# Patient Record
Sex: FEMALE | Race: WHITE
[De-identification: names, ages, dates, MRNs, and addresses within clinical notes are randomized per-mention and may not be internally consistent; named-entity substitution may affect disease eponyms.]

---

## 2017-06-29 ENCOUNTER — HOSPITAL ENCOUNTER (OUTPATIENT)
Dept: HOSPITAL 53 - M LAB REF | Age: 82
End: 2017-06-29
Attending: NURSE PRACTITIONER
Payer: MEDICARE

## 2017-06-29 DIAGNOSIS — R07.9: Primary | ICD-10-CM

## 2017-09-18 ENCOUNTER — HOSPITAL ENCOUNTER (OUTPATIENT)
Dept: HOSPITAL 53 - M ED | Age: 82
Setting detail: OBSERVATION
LOS: 2 days | Discharge: HOME | End: 2017-09-20
Attending: INTERNAL MEDICINE | Admitting: INTERNAL MEDICINE
Payer: MEDICARE

## 2017-09-18 VITALS — WEIGHT: 172.4 LBS | BODY MASS INDEX: 27.71 KG/M2 | HEIGHT: 66 IN
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
## 2017-09-18 DIAGNOSIS — Z87.891: ICD-10-CM
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
## 2017-09-18 DIAGNOSIS — I11.0: ICD-10-CM
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
## 2017-09-18 DIAGNOSIS — Z95.2: ICD-10-CM

## 2017-09-18 DIAGNOSIS — I48.91: ICD-10-CM

## 2017-09-18 DIAGNOSIS — Z95.0: ICD-10-CM
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
## 2017-09-18 DIAGNOSIS — Z79.899: ICD-10-CM

## 2017-09-18 DIAGNOSIS — R91.8: ICD-10-CM

## 2017-09-18 DIAGNOSIS — I50.32: ICD-10-CM

## 2017-09-18 DIAGNOSIS — N18.3: ICD-10-CM

## 2017-09-18 DIAGNOSIS — Z79.01: ICD-10-CM

## 2017-09-18 DIAGNOSIS — S27.322A: Primary | ICD-10-CM

## 2017-09-18 LAB
ANION GAP SERPL CALC-SCNC: 5 MEQ/L (ref 8–16)
BASOPHILS # BLD AUTO: 0.1 K/MM3 (ref 0–0.2)
BASOPHILS NFR BLD AUTO: 0.6 % (ref 0–1)
BUN SERPL-MCNC: 21 MG/DL (ref 7–18)
CALCIUM SERPL-MCNC: 9.6 MG/DL (ref 8.8–10.2)
CHLORIDE SERPL-SCNC: 99 MEQ/L (ref 98–107)
CO2 SERPL-SCNC: 39 MEQ/L (ref 21–32)
CREAT SERPL-MCNC: 1.03 MG/DL (ref 0.55–1.02)
EOSINOPHIL # BLD AUTO: 0.2 K/MM3 (ref 0–0.5)
EOSINOPHIL NFR BLD AUTO: 1.3 % (ref 0–3)
ERYTHROCYTE [DISTWIDTH] IN BLOOD BY AUTOMATED COUNT: 14.3 % (ref 11.5–14.5)
GFR SERPL CREATININE-BSD FRML MDRD: 54.9 ML/MIN/{1.73_M2} (ref 32–?)
GLUCOSE SERPL-MCNC: 116 MG/DL (ref 83–110)
INR PPP: 2.26
LARGE UNSTAINED CELL #: 0.2 K/MM3 (ref 0–0.4)
LARGE UNSTAINED CELL %: 2.1 % (ref 0–4)
LYMPHOCYTES # BLD AUTO: 1.1 K/MM3 (ref 1.5–4.5)
LYMPHOCYTES NFR BLD AUTO: 7.7 % (ref 24–44)
MCH RBC QN AUTO: 29.1 PG (ref 27–33)
MCHC RBC AUTO-ENTMCNC: 31.6 G/DL (ref 32–36.5)
MCV RBC AUTO: 92.1 FL (ref 80–96)
MONOCYTES # BLD AUTO: 1.1 K/MM3 (ref 0–0.8)
MONOCYTES NFR BLD AUTO: 9.3 % (ref 0–5)
NEUTROPHILS # BLD AUTO: 9.1 K/MM3 (ref 1.8–7.7)
NEUTROPHILS NFR BLD AUTO: 79 % (ref 36–66)
PLATELET # BLD AUTO: 226 K/MM3 (ref 150–450)
POTASSIUM SERPL-SCNC: 3.8 MEQ/L (ref 3.5–5.1)
SODIUM SERPL-SCNC: 143 MEQ/L (ref 136–145)
WBC # BLD AUTO: 11.5 K/MM3 (ref 4–10)

## 2017-09-18 PROCEDURE — 85025 COMPLETE CBC W/AUTO DIFF WBC: CPT

## 2017-09-18 PROCEDURE — 71101 X-RAY EXAM UNILAT RIBS/CHEST: CPT

## 2017-09-18 PROCEDURE — 99285 EMERGENCY DEPT VISIT HI MDM: CPT

## 2017-09-18 PROCEDURE — 94640 AIRWAY INHALATION TREATMENT: CPT

## 2017-09-18 PROCEDURE — 80048 BASIC METABOLIC PNL TOTAL CA: CPT

## 2017-09-18 PROCEDURE — 85610 PROTHROMBIN TIME: CPT

## 2017-09-18 PROCEDURE — 96374 THER/PROPH/DIAG INJ IV PUSH: CPT

## 2017-09-18 PROCEDURE — 71260 CT THORAX DX C+: CPT

## 2017-09-18 PROCEDURE — 36415 COLL VENOUS BLD VENIPUNCTURE: CPT

## 2017-09-18 PROCEDURE — 71010: CPT

## 2017-09-18 PROCEDURE — 94664 DEMO&/EVAL PT USE INHALER: CPT

## 2017-09-18 PROCEDURE — 70450 CT HEAD/BRAIN W/O DYE: CPT

## 2017-09-18 PROCEDURE — 93005 ELECTROCARDIOGRAM TRACING: CPT

## 2017-09-18 PROCEDURE — 83735 ASSAY OF MAGNESIUM: CPT

## 2017-09-18 PROCEDURE — 73502 X-RAY EXAM HIP UNI 2-3 VIEWS: CPT

## 2017-09-18 PROCEDURE — 97162 PT EVAL MOD COMPLEX 30 MIN: CPT

## 2017-09-18 PROCEDURE — 71020: CPT

## 2017-09-18 NOTE — REPUSA
CT of the head

Clinical history: Fall.

Protocol: Multiple axial CT images obtained with 5 mm slice thickness were obtained through the head 
without administration of contrast.

Findings: The ventricles and sulci are symmetric but prominent in size bilaterally. There are periven
tricular areas of low attenuation throughout the deep white matter. There is no evidence of acute hem
orrhage or infarct. There is no midline shift, mass effect, or extra-axial fluid collection. The osse
ous structures are unremarkable. The visualized paranasal sinuses and mastoid air cells are clear.

Impression: No acute hemorrhage or infarct. Findings are consistent with age-related atrophy and 
jin small vessel ischemic disease.

     Electronically signed by ISABELA LÓPEZ MD on 09/18/2017 08:59:45 PM ET

## 2017-09-19 VITALS — DIASTOLIC BLOOD PRESSURE: 95 MMHG | SYSTOLIC BLOOD PRESSURE: 137 MMHG

## 2017-09-19 VITALS — SYSTOLIC BLOOD PRESSURE: 178 MMHG | DIASTOLIC BLOOD PRESSURE: 75 MMHG

## 2017-09-19 VITALS — SYSTOLIC BLOOD PRESSURE: 178 MMHG | DIASTOLIC BLOOD PRESSURE: 79 MMHG

## 2017-09-19 VITALS — SYSTOLIC BLOOD PRESSURE: 151 MMHG | DIASTOLIC BLOOD PRESSURE: 69 MMHG

## 2017-09-19 VITALS — SYSTOLIC BLOOD PRESSURE: 163 MMHG | DIASTOLIC BLOOD PRESSURE: 75 MMHG

## 2017-09-19 VITALS — DIASTOLIC BLOOD PRESSURE: 72 MMHG | SYSTOLIC BLOOD PRESSURE: 140 MMHG

## 2017-09-19 RX ADMIN — POTASSIUM CHLORIDE SCH MEQ: 750 TABLET, EXTENDED RELEASE ORAL at 08:47

## 2017-09-19 RX ADMIN — Medication SCH MG: at 19:59

## 2017-09-19 RX ADMIN — Medication SCH UNITS: at 08:46

## 2017-09-19 RX ADMIN — Medication SCH MG: at 08:46

## 2017-09-19 RX ADMIN — FLUTICASONE PROPIONATE AND SALMETEROL XINAFOATE SCH PUFF: 45; 21 AEROSOL, METERED RESPIRATORY (INHALATION) at 11:00

## 2017-09-19 RX ADMIN — TORSEMIDE SCH MG: 100 TABLET ORAL at 08:46

## 2017-09-19 RX ADMIN — FLUTICASONE PROPIONATE AND SALMETEROL XINAFOATE SCH PUFF: 45; 21 AEROSOL, METERED RESPIRATORY (INHALATION) at 20:45

## 2017-09-19 RX ADMIN — Medication SCH MG: at 16:17

## 2017-09-19 RX ADMIN — TORSEMIDE SCH MG: 100 TABLET ORAL at 12:05

## 2017-09-19 NOTE — REP
Chest, single AP view, the patient supine, and expiration view:

 

Comparisons are the plain film PA and lateral chest dated 07/22/2015 and chest CT

dated 09/19/2017 and CT dated 08/20/2016.

 

Massive cardiomegaly and sternotomy wires and pacemaker are again noted,

unchanged.

 

There is chronic increased radiodensity in the right middle lobe, unchanged from

all prior studies, compatible with chronic atelectasis and scarring.

 

There is no pneumothorax or hemothorax.

 

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

 

 

Signed by

Sawyer Pantoja MD 09/19/2017 07:28 A

## 2017-09-19 NOTE — HPEPDOC
Medical History and Physical


History and Physical


Primary care provider: Dr. Telles


Date of Admission: 09/19/2017





Attending: Dr. Triny Mccarthy





CHIEF COMPLAINT: 


"I tripped and fell"





HISTORY OF PRESENT ILLNESS:


Ms. Basilio is an 82-year-old  female who is on chronic warfarin 

therapy for atrial fibrillation who tripped and fell earlier today. She denies 

any syncope, lightheadedness, or any prodromal symptoms, she simply tripped. 

She did hit her head, and does have a large contusion on the right side of her 

face. CT of the head was negative for any hemorrhage or osseous damage.  Her 

chest x-ray did show a small opacity on the right side, therefore a chest CT 

was ordered, and pulmonary contusion was confirmed.  As the patient is on 

warfarin therapy, the time during which she is at the highest risk of 

developing a bleed would be within the first 24 hours, therefore the decision 

was made to admit the patient and repeat a chest CT after at least 12 hours had 

passed.





ALLERGIES: 


Chlorpromazine (thorazine), codeine, penicillins





PAST MEDICAL HISTORY:


Chronic atrial fibrillation, on chronic Coumadin therapy


Essential hypertension


Mitral valve replacement (porcine) s/p sequelae of scarlet fever 


Chronic diastolic congestive heart failure


Stage III Chronic Kidney Disease





PAST SURGICAL HISTORY:


Mitral valve replacement





SOCIAL HISTORY:


She has been retired for over 20 years.  She used to smoke half pack a day for 

20 years but quit in 1975. She does drink alcohol occasionally. Denies 

recreational drug use.





FAMILY HISTORY:


No pertinent family history





REVIEW OF SYSTEMS:


Constitutional: Patient denies fevers, chills, night sweats, recent weight gain/

loss.


HEENT: Patient denies blurred or double vision, transient visual disturbances, 

postnasal drip, epistaxis, sore throat, difficulty chewing or swallowing food.


Cardiovascular: Patient denies chest discomfort/pain, palpitations, exertional 

dyspnea, orthopnea, edema of the extremities, claudication.


Respiratory: Patient denies dyspnea, she does state that she has coughed a few 

times since she has been in the emergency department, but it is nonproductive. 

She denies hemoptysis. She does admit to some chest wall pain, mostly on the 

right side.


Gastrointestinal: Patient denies nausea, vomiting, diarrhea, constipation, 

abdominal pain, melena, hematochezia, hematemesis, jaundice.





PHYSICAL EXAMINATION:


Vitals: Temperature 97.9, pulse 82, respirations 16, blood pressure 153/88, 

pulse oximetry 96% on room air





General: Awake, alert, oriented 3. She is in no acute distress. She denies any 

recent fevers, chills, night sweats.  


HEENT: Head normocephalic, she does have a large contusion lateral to her right 

eye.  pupils equally reactive to light and accommodation, conjunctiva are pink, 

sclera are nonicteric, buccal mucosa is pink and moist with no lesions in the 

oropharynx. Hearing is grossly intact to conversation.


Respiratory: She did have a minimal wheeze, although with a few deep breaths 

and this resolved. Otherwise she is clear to auscultation bilaterally


Cardiovascular: Irregularly irregular with variable S1 and S2. There is no 

evidence of chest wall deformity. There is no evidence of any contusion or 

ecchymoses on the chest wall.  She is diffusely (yet minimally) tender to 

palpation on the right lateral chest wall.


Abdomen: Soft, nontender, nondistended, no hepatosplenomegaly appreciated. 

Bowel sounds present.


Extremities: 2+ pulses in the radial and dorsalis pedis bilaterally. No 

evidence of clubbing or cyanosis.





IMAGING: 


In no acute traumatic injury or hemorrhages noted on the CT of the head. 

Bilateral lower lobe contusions, right worse than left and noted on CT of the 

chest.





ASSESSMENT:


1. Bilateral lower lobe lung contusions


2. Fall secondary to tripping


3. Chronic atrial fibrillation, on Coumadin


4. Essential hypertension


5. Chronic diastolic congestive heart failure


6. History of mitral valve replacement


7. Wheezing for the past 1-2 months


8. DVT prophylaxis with Coumadin





PLAN:


Will admit the patient for observation. The patient was instructed to call the 

nurse immediately if there is any change in her respiratory status, pain, if 

she developed any shortness of breath, or hemoptysis.  We will continue to 

monitor her vital signs and clinical status.  Consideration may be made to 

repeat imaging later today if this is clinically warranted.  Otherwise, we'll 

continue with her usual doses of her home regimen for her chronic medical 

issues. She was given 1 dose of PO Toradol which apparently was insufficient, 

therefore she also received 1 dose of IV morphine in the emergency department 

which she was able to tolerate without any issue in light of her codeine 

allergy.  We will attempt to control her pain with tramadol and during her stay 

here.  As the patient is in pain, she is also at risk for the development of 

pneumonia if she does not continue to take deep breaths, therefore I have also 

ordered for incentive spirometry.  She also reports that she began wheezing a 

few months ago for which she was started on an inhaler, we will continue her 

home dose, and she will likely need to continue to work this up as an 

outpatient.





My preceptor for this patient encounter was physically present in the building 

during the encounter and was fully available. As needed, all aspects of the 

patient interview, examination, medical decision making process, and medical 

care plan development were reviewed and approved by the preceptor. Preceptor is 

aware and concurs with the plan as stated in the body of this note and will 

attest to such by his/her cosignature.





Vital Signs





Vital Signs








  Date Time  Temp Pulse Resp B/P (MAP) Pulse Ox O2 Delivery O2 Flow Rate FiO2


 


9/19/17 03:15   18     


 


9/19/17 03:14    136/79 (98)    


 


9/19/17 03:08  68   93   


 


9/19/17 01:25      Room Air  


 


9/18/17 19:21 97.9       











Laboratory Data


Labs 24H


Laboratory Tests 2


9/18/17 20:09: 


White Blood Count 11.5H, Red Blood Count 4.66, Hemoglobin 13.6, Hematocrit 42.9

, Mean Corpuscular Volume 92.1, Mean Corpuscular Hemoglobin 29.1, Mean 

Corpuscular Hemoglobin Concent 31.6L, Red Cell Distribution Width 14.3, 

Platelet Count 226, Neutrophils (%) (Auto) 79.0H, Lymphocytes (%) (Auto) 7.7L, 

Monocytes (%) (Auto) 9.3H, Eosinophils (%) (Auto) 1.3, Basophils (%) (Auto) 0.6

, Neutrophils # (Auto) 9.1H, Lymphocytes # (Auto) 1.1L, Monocytes # (Auto) 1.1H

, Eosinophils # (Auto) 0.2, Basophils # (Auto) 0.1, Large Unclassified Cells % 

2.1, Large Unclassified Cells # 0.2, Prothrombin Time 25.8H, Prothromb Time 

International Ratio 2.26, Anion Gap 5L, Glomerular Filtration Rate 54.9, Blood 

Urea Nitrogen 21H, Creatinine 1.03H, Sodium Level 143, Potassium Level 3.8, 

Chloride Level 99, Carbon Dioxide Level 39H, Calcium Level 9.6


CBC/BMP


Laboratory Tests


9/18/17 20:09








Red Blood Count 4.66, Mean Corpuscular Volume 92.1, Mean Corpuscular Hemoglobin 

29.1, Mean Corpuscular Hemoglobin Concent 31.6 L, Red Cell Distribution Width 

14.3, Neutrophils (%) (Auto) 79.0 H, Lymphocytes (%) (Auto) 7.7 L, Monocytes (%

) (Auto) 9.3 H, Eosinophils (%) (Auto) 1.3, Basophils (%) (Auto) 0.6, 

Neutrophils # (Auto) 9.1 H, Lymphocytes # (Auto) 1.1 L, Monocytes # (Auto) 1.1 H

, Eosinophils # (Auto) 0.2, Basophils # (Auto) 0.1, Calcium Level 9.6





Home Medications


Scheduled


Calcium/Vitamin D (Oyster Shell Calcium + -200 mg-Unit) 1 Tab Tab, 1 TAB 

PO TID


Cholecalciferol (Vitamin D) 5,000 Unit Tab, 5,000 UNIT PO QPM


Multivitamins *[facility] STOCKED* (Thera M Plus *[facility] STOCKED*) 1 Tab Tab, 1 TAB PO 

DAILY


Potassium Chloride (K-Tab) 20 Meq Tab, 20 MEQ PO BID


Salmeterol/Fluticasone (Advair Diskus 100-50 Mcg/Dose) 28 Puff/Inhaler Aerp, 1 

PUFF INH BID


Simvastatin - High Dose (Zocor) 40 Mg Tab, 40 MG PO QHS


Torsemide (Torsemide) 100 Mg Tab, 100 MG PO DAILY


Warfarin Sod (Coumadin) 3 Mg Tab, 3 MG PO QPM





Scheduled PRN


Albuterol/Ipratropium (Ipratropium Bromide/Albut 0.5-2.5 (3) mg/3Ml) 1 Sol Sol, 

1 SOL INH QID PRN for SHORTNESS OF BREATH





Miscellaneous Medications


[Patient Comment]


   PATIENT UNSURE OF MOST OF HER MEDICATIONS AND WHEN SHE TAKES THEM. NOT A 

RELIABLE HISTORIAN. GOING TO CALL SPOUSE OR THE PHARMACY IN THE MORNING 





Allergies


Coded Allergies:  


     Chlorpromazine (Verified  Allergy, Intermediate, RASH, 4/8/13)


     Penicillins (Verified  Allergy, Intermediate, RASH, 4/8/13)


     Penicillins Cross Reactors (Verified  Allergy, Intermediate, RASH, 4/8/13)


     Codeine (Verified  Adverse Reaction, Mild, VOMITTING, 4/8/13)











CHELSEY SHEEHAN DO Sep 19, 2017 03:27

## 2017-09-19 NOTE — REP
Right hip two views:

 

Comparison is 03/17/2016.

 

There is advanced osteoarthritis, unchanged.  There is no fracture or

dislocation.  There is diffuse demineralization.

 

The bladder is opacified because of the IV contrast for the CT of the chest

earlier this same date.

 

 

Signed by

Sawyer Pantoja MD 09/19/2017 08:36 A

## 2017-09-19 NOTE — REPUSA
CLINICAL HISTORY: Trauma.

TECHNIQUE: Multiple axial CT images were obtained through chest with IV contrast material. MPR corona
l and sagittal sequences were obtained.

COMMENTS:

Pacemaker wires in good position.

Moderate cardiomegaly.

Mild central pulmonary venous congestion.

Mild bilateral basilar contusions in the lower lobes.

Subsegmental atelectatic airspace disease in the right lower lobe.

There is no evidence of pleural or parenchymal mass. There are no pleural effusions. There is no evid
ence of hilar or mediastinal lymphadenopathy. The heart and great vessels are within normal limits.

The visualized portions of the liver are of uniform attenuation without mass or defect. There is no i
ntra or extrahepatic biliary ductal dilatation. The spleen is unremarkable. The visualized pancreas i
s of normal contour and attenuation characteristics. There is no evidence of adrenal mass. The visual
ized portions of the kidneys present no abnormalities.

The bony structures are free of lytic or blastic lesions. Multilevel degenerative changes are seen in
volving the thoracic spine. Scattered calcifications are seen involving the aorta and visualized marie
r branches compatible with atherosclerosis.

No evidence for abnormal enhancement.

IMPRESSION:

Congestive heart failure.

Mild bilateral basilar pulmonary contusions in the lower lobes more prominent on the right.

No fracture.

Thank you for your kind referral of this patient.

 

     Electronically signed by ABISAI MCDANIEL MD on 09/19/2017 01:09:07 AM ET

## 2017-09-19 NOTE — REP
Right ribs and PA chest:

 

Right ribs four views:

 

There is no rib fracture or other rib abnormality.

 

There is osteoarthritis in the right chronic lobe of the humeral articulations.

 

 

 

PA chest two views add inspiration and expiration:

 

Comparison is 06/08/2017.

 

There is chronic cardiomegaly.  Sternotomy wires and pacemaker are again

identified.  There is chronic increased radiodensity inferolaterally in the right

lung compatible with chronic parenchymal scarring.

 

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

 

There is no pneumothorax, hemothorax or pulmonary contusion.

 

 

Signed by

Sawyer Pantoja MD 09/19/2017 07:24 A

## 2017-09-19 NOTE — REP
PA and lateral chest:

 

Comparisons are 09/18/2017, 06/08/2017, chest CT and 09/08/2016 and chest CT of

09/19/2017 of 36 a.m.:

 

There is chronic cardiomegaly, sternotomy wires and pacemaker, unchanged from all

prior studies.

 

There is chronic collapse and atelectasis of the right middle lobe, unchanged

from all prior studies.

 

On the most recent CT there are subsegmental infiltrates in the lower lobes

bilaterally, not present on the comparison CT, not visible on the current plain

film study.

 

There is no pneumothorax.  No definite pleural effusion.

 

The bryanna and mediastinum are unremarkable.  Bony thorax is unchanged.  There is

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

 

Impression:

 

No significant interval changes.  However, by CT there are small bilateral lower

lobe infiltrates.

 

 

Signed by

Sawyer Pantoja MD 09/19/2017 08:44 A

## 2017-09-19 NOTE — ECGEPIP
Stationary ECG Study

                           [facility] - ED

                                       

                                       Test Date:    2017

Pat Name:     GURWINDER LAGOS         Department:   

Patient ID:   [id number]                 Room:         -

Gender:       F                        Technician:   ks

:          [id number]               Requested By: NAIMA TRIPATHI 

Order Number: [id number]     Reading MD:   Madhu Kiser

                                 Measurements

Intervals                              Axis          

Rate:         72                       P:            

NC:           0                        QRS:          -67

QRSD:         129                      T:            75

QT:           413                                    

QTc:          453                                    

                           Interpretive Statements

ATRIAL FIBRILLATION WITH ABERRANT CONDUCTION OR VENTRICULAR PREMATURE

COMPLEXES

LEFT AXIS DEVIATION

ANTEROSEPTAL MYOCARDIAL INFARCTION, OF INDETERMINATE AGE

SIMILAR TO 6/24/15

 

Electronically Signed On 2017 5:52:25 EDT by Madhu Kiser

## 2017-09-19 NOTE — IPNPDOC
Subjective


Date Seen


The patient was seen on 9/19/17.





Subjective


Chief Complaint/HPI


The patient is a 82-year-old female admitted with a reason for visit of FELL.


General:  Denies: Chills, Night Sweats


Constitutional:  Denies: Chills, Fever, Malaise, Weakness, Fatigue


Eyes:  Denies: Pain, Vision change, Conjunctivae inflammation, Eyelid 

inflammation


ENT:  Denies: Head Aches, Ear Pain


Skin:  Denies: Rash


Pulmonary:  Denies: Dyspnea, Cough, Pleuritic Chest Pain, Other Symptoms


Cardiovascular:  Denies: Chest Pain, Palpitations, Orthopnea


Gastrointestinal:  Denies: Nausea, Vomiting


Genitourinary:  Denies: Dysuria


Musculoskeletal:  Reports: Other Symptoms (minimal right upper thoracic rib 

cage pain with movement, much improved from admission), 


   Denies: Neck Pain, Back Pain


Neurological:  Denies: Weakness, Numbness


Psych:  Reports: Mood Normal





Objective


Physical Examination


General Exam:  Positive: Alert, Cooperative, No Acute Distress


Eye Exam:  Positive: Conjunctiva & lids normal, EOMI, Other Eye Symptoms (

minimal bruising of right supra-orbit from fall, denies change in vision/

blurred vision or pain with eye movement), 


   Negative: Sclera icteric, Ptosis


ENT Exam:  Positive: Atraumatic, Mucous membr. moist/pink, Pharynx Normal, 

Tongue Midline, Nares Patent, 


   Negative: Pharyngeal Edema


Neck Exam:  Positive: Supple, 


   Negative: JVD, thyromegaly


Chest Exam:  Positive: Clear to auscultation, Normal air movement, Rhonchi, 


   Negative: Rales, Wheezing, Diminished


Heart Exam:  Positive: Rate Normal, Normal S1, Normal S2, 


   Negative: Murmurs, Rubs


Abdomen Exam:  Positive: Normal bowel sounds


Extremity Exam:  Positive: Normal pulses, 


   Negative: Clubbing, Cyanosis, Edema, Tenderness, Swelling


Skin Exam:  Negative: Rash, Breakdown


Neuro Exam:  Positive: Normal Speech





Assessment /Plan


Problems





(1) Pulmonary contusion


Status:  Acute


Response to Treatment:  Stable


Problem Text:  repeat CXR done today showed no interval changes but did comment 

on b/l lower lobe infiltrates. Pt did sound a bit rhonchus on lung exam but 

does not have a white count, is not complaining of cough, is afebrile, do not 

think/suspect this is pneumonia. Pt feels well and states her pain is much 

improved from admission. Will continue to monitor. Use of incentive spirometry 

was stressed to pt. Pt asked to alert nurse if she has SOB or begins to cough 

up blood. Pt verbalized understanding.





(2) Infiltrate noted on imaging study


Response to Treatment:  Stable


Problem Text:  As above, in light of no white count and afebrile with no active 

complaints of cough, will continue to monitor as do not suspect this is due to 

pneumonia. Incentive spirometry use was encouraged to pt.





(3) Diastolic heart failure


Status:  Chronic


Response to Treatment:  Stable


Problem Text:  pt seems euvolemic


will continue home torsemide





(4) Hypertension


Status:  Chronic


Response to Treatment:  Stable


Problem Text:  178/75 pt receiving IV torsemide


will continue to monitor, if it continues to increase may consider IV 

hydralizine if SBP >180





(5) H/O mitral valve replacement


Status:  Chronic


Response to Treatment:  Stable


Problem Text:  stable, porcine valve, would continue to monitor





(6) Atrial fibrillation


Status:  Chronic


Response to Treatment:  Stable


Problem Text:  continue coumadin, INR is therapeutic





(7) DVT prophylaxis


Status:  Acute


Response to Treatment:  Stable


Problem Text:  coumidin therapy








Plan/VTE


VTE Prophylaxis Ordered?:  Yes





VS, I&O, 24H, Fishbone


Vital Signs/I&O





Vital Signs








  Date Time  Temp Pulse Resp B/P (MAP) Pulse Ox O2 Delivery O2 Flow Rate FiO2


 


9/19/17 12:09      Room Air  


 


9/19/17 12:00 97.9 69 20 178/75 (109) 94   














I&O- Last 24 Hours up to 6 AM 


 


 9/19/17





 06:00


 


Intake Total 30 ml


 


Balance 30 ml











Laboratory Data


24H LABS


Laboratory Tests 2


9/18/17 20:09: 


White Blood Count 11.5H, Red Blood Count 4.66, Hemoglobin 13.6, Hematocrit 42.9

, Mean Corpuscular Volume 92.1, Mean Corpuscular Hemoglobin 29.1, Mean 

Corpuscular Hemoglobin Concent 31.6L, Red Cell Distribution Width 14.3, 

Platelet Count 226, Neutrophils (%) (Auto) 79.0H, Lymphocytes (%) (Auto) 7.7L, 

Monocytes (%) (Auto) 9.3H, Eosinophils (%) (Auto) 1.3, Basophils (%) (Auto) 0.6

, Neutrophils # (Auto) 9.1H, Lymphocytes # (Auto) 1.1L, Monocytes # (Auto) 1.1H

, Eosinophils # (Auto) 0.2, Basophils # (Auto) 0.1, Large Unclassified Cells % 

2.1, Large Unclassified Cells # 0.2, Prothrombin Time 25.8H, Prothromb Time 

International Ratio 2.26, Anion Gap 5L, Glomerular Filtration Rate 54.9, Blood 

Urea Nitrogen 21H, Creatinine 1.03H, Sodium Level 143, Potassium Level 3.8, 

Chloride Level 99, Carbon Dioxide Level 39H, Calcium Level 9.6


CBC/BMP


Laboratory Tests


9/18/17 20:09








Red Blood Count 4.66, Mean Corpuscular Volume 92.1, Mean Corpuscular Hemoglobin 

29.1, Mean Corpuscular Hemoglobin Concent 31.6 L, Red Cell Distribution Width 

14.3, Neutrophils (%) (Auto) 79.0 H, Lymphocytes (%) (Auto) 7.7 L, Monocytes (%

) (Auto) 9.3 H, Eosinophils (%) (Auto) 1.3, Basophils (%) (Auto) 0.6, 

Neutrophils # (Auto) 9.1 H, Lymphocytes # (Auto) 1.1 L, Monocytes # (Auto) 1.1 H

, Eosinophils # (Auto) 0.2, Basophils # (Auto) 0.1, Calcium Level 9.6





GME ATTESTATION


GME ATTESTATION


My preceptor for this patient encounter was physically present in the building 

during the encounter and was fully available. As needed, all aspects of the 

patient interview, examination, medical decision making process, and medical 

care plan development were reviewed and approved by the preceptor. Preceptor is 

aware and concurs with the plan as stated in the body of this note and will 

attest to such by his/her cosignature.





ATTENDING NOTE


I have seen and examined the above patient and agree with the progress note as 

documented above.











LINDA REDMAN DO Sep 19, 2017 12:45


PARMINDER NEVES Sep 30, 2017 18:11

## 2017-09-20 VITALS — SYSTOLIC BLOOD PRESSURE: 169 MMHG | DIASTOLIC BLOOD PRESSURE: 76 MMHG

## 2017-09-20 VITALS — DIASTOLIC BLOOD PRESSURE: 71 MMHG | SYSTOLIC BLOOD PRESSURE: 155 MMHG

## 2017-09-20 VITALS — DIASTOLIC BLOOD PRESSURE: 77 MMHG | SYSTOLIC BLOOD PRESSURE: 167 MMHG

## 2017-09-20 LAB
ANION GAP SERPL CALC-SCNC: 5 MEQ/L (ref 8–16)
BASOPHILS # BLD AUTO: 0.1 K/MM3 (ref 0–0.2)
BASOPHILS NFR BLD AUTO: 0.5 % (ref 0–1)
BUN SERPL-MCNC: 20 MG/DL (ref 7–18)
CALCIUM SERPL-MCNC: 9.5 MG/DL (ref 8.8–10.2)
CHLORIDE SERPL-SCNC: 97 MEQ/L (ref 98–107)
CO2 SERPL-SCNC: 35 MEQ/L (ref 21–32)
CREAT SERPL-MCNC: 0.78 MG/DL (ref 0.55–1.02)
EOSINOPHIL # BLD AUTO: 0.1 K/MM3 (ref 0–0.5)
EOSINOPHIL NFR BLD AUTO: 1 % (ref 0–3)
ERYTHROCYTE [DISTWIDTH] IN BLOOD BY AUTOMATED COUNT: 14.2 % (ref 11.5–14.5)
GFR SERPL CREATININE-BSD FRML MDRD: > 60 ML/MIN/{1.73_M2} (ref 32–?)
GLUCOSE SERPL-MCNC: 110 MG/DL (ref 83–110)
INR PPP: 2.35
LARGE UNSTAINED CELL #: 0.3 K/MM3 (ref 0–0.4)
LARGE UNSTAINED CELL %: 2.6 % (ref 0–4)
LYMPHOCYTES # BLD AUTO: 1 K/MM3 (ref 1.5–4.5)
LYMPHOCYTES NFR BLD AUTO: 8.2 % (ref 24–44)
MAGNESIUM SERPL-MCNC: 2.3 MG/DL (ref 1.8–2.4)
MCH RBC QN AUTO: 30.3 PG (ref 27–33)
MCHC RBC AUTO-ENTMCNC: 33.6 G/DL (ref 32–36.5)
MCV RBC AUTO: 90.4 FL (ref 80–96)
MONOCYTES # BLD AUTO: 1 K/MM3 (ref 0–0.8)
MONOCYTES NFR BLD AUTO: 8.2 % (ref 0–5)
NEUTROPHILS # BLD AUTO: 9.6 K/MM3 (ref 1.8–7.7)
NEUTROPHILS NFR BLD AUTO: 79.4 % (ref 36–66)
PLATELET # BLD AUTO: 211 K/MM3 (ref 150–450)
POTASSIUM SERPL-SCNC: 3.4 MEQ/L (ref 3.5–5.1)
SODIUM SERPL-SCNC: 137 MEQ/L (ref 136–145)
WBC # BLD AUTO: 12.1 K/MM3 (ref 4–10)

## 2017-09-20 RX ADMIN — POTASSIUM CHLORIDE SCH MEQ: 750 TABLET, EXTENDED RELEASE ORAL at 08:47

## 2017-09-20 RX ADMIN — TORSEMIDE SCH MG: 100 TABLET ORAL at 08:47

## 2017-09-20 RX ADMIN — Medication SCH MG: at 08:47

## 2017-09-20 RX ADMIN — Medication SCH UNITS: at 08:47

## 2017-09-20 NOTE — DS.PDOC
Discharge Summary


General


Date of Admission


Sep 18, 2017 at 19:09


Date of Discharge


9-20-17


Primary Care Physician:  Jr Telles Collins





Discharge Summary


PROCEDURES PERFORMED DURING STAY: None





ADMITTING DIAGNOSES: 


1. B/l lower lobe contusions


2. Fall secondary to tripping


3. Chronic A. fib on coumidin


4. Essential HTN


5. Chronic diastolic CHF


6. Hx of mitral valve replacement


7. wheezing for past 2 months


8. DVT prohphylaxis on coumadin





DISCHARGE DIAGNOSES:


1. Lung contusion


2.  Infiltrates on CT


3. Diastolic HF


4. HTN


5. history of MV replacement


6. DVT prophylaxis








COMPLICATIONS/CHIEF COMPLAINT: Altered Mental Status/Pulmonary Contusion.





HISTORY OF PRESENT ILLNESS: Pt is a 83 y/o female who presened to ED on 9-19- 2017 with complaint of having tripped on her rug that day and falling on her 

side and hitting her head. 





HOSPITAL COURSE:  During the stay the pt did have some mild right upper 

thoracic rib cage pain with movement, especially with sitting upright in bed, 

this was controlled with tramadol medication. The pt did not complain of SOB or 

coughing up blood during the course of her stay. She did also suffer a right 

sided supra orbital contusion but did not suffer from LOC from her fall, during 

her stay she did not complain of any change in vision, blurred vision or pain 

with eye movement. On the day of d/c the pt was in some discomfort before her 

tramadol medication but stated it felt much better when her medication had 

taken effect, she denied being SOB or having any episodes of dizzyness or chest 

pain nor racing heart. 





DISCHARGE MEDICATIONS: Please see below.


 


ALLERGIES: Please see below.





PHYSICAL EXAMINATION ON DISCHARGE:


VITAL SIGNS: Please see below.


GENERAL: AAOx3, conversant and plesant, NAD


HEENT:  nares patent b/l, EOMI, clear conjunctiva, no pain with eye movements. 

NCAT.


NECK: supple


CARDIOVASCULAR EXAMINATION: normal s1 and s2, no murmurs, rubs or gallops 

appreciated


RESPIRATORY EXAMINATION: cta b/l, no wheezing, rhonchi or rales appreciated


ABDOMINAL EXAMINATION: soft, non-distended, nabsx4, no rebound ridgity or 

guarding appreciated


EXTREMITIES: no rashes, erythema or clubbing noted


SKIN: intact


NEUROLOGICAL EXAMINATION: no focal deficits appreciated


PSYCHIATRIC EXAMINATION: normal affect





LABORATORY DATA: Please see below.





IMAGING: 





CT  head 9-18-17 


Impression: No acute hemorrhage or infarct. Findings are consistent with age-

related atrophy and chronic small vessel ischemic disease.





Rib x-ray 9-18-17


There is chronic cardiomegaly.  Sternotomy wires and pacemaker are again


identified.  There is chronic increased radiodensity inferolaterally in the 

right


lung compatible with chronic parenchymal scarring.


 


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


 


There is no pneumothorax, hemothorax or pulmonary contusion.





CXR 9-18-17


 


Massive cardiomegaly and sternotomy wires and pacemaker are again noted,


unchanged.


 


There is chronic increased radiodensity in the right middle lobe, unchanged from


all prior studies, compatible with chronic atelectasis and scarring.


 


There is no pneumothorax or hemothorax.


 


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


 


Chest CT 9-18-17


IMPRESSION:


Congestive heart failure.


Mild bilateral basilar pulmonary contusions in the lower lobes more prominent 

on the right.


No fracture.





Hip x-ray 9-19-17


There is advanced osteoarthritis, unchanged.  There is no fracture or


dislocation.  There is diffuse demineralization.


 


The bladder is opacified because of the IV contrast for the CT of the chest


earlier this same date


CXR 9-19-17





Impression:


 


No significant interval changes.  However, by CT there are small bilateral lower


lobe infiltrates.








PROGNOSIS: favorable





ACTIVITY: As tolerated





DIET: as tolerated





DISCHARGE PLAN: follow with PCP within one week of d/c





DISPOSITION: stable





DISCHARGE INSTRUCTIONS:


1. Follow with PCP within one week of d/c


2. Should you experience coughing with blood, fever, increased chest pain, 

shortness of breath return to ED ASAP








ITEMS TO FOLLOWUP ON ON OUTPATIENT:


1. Follow with PCP within one week of d/c








DISCHARGE CONDITION: Stable





TIME SPENT ON DISCHARGE: Greater than 35 minutes.





Vital Signs/I&Os





Vital Signs








  Date Time  Temp Pulse Resp B/P (MAP) Pulse Ox O2 Delivery O2 Flow Rate FiO2


 


9/20/17 08:07      Room Air  


 


9/20/17 08:00 98.2 64 20 167/77 (107) 93   














I&O- Last 24 Hours up to 6 AM 


 


 9/21/17





 06:00


 


Intake Total 720 ml


 


Output Total 100 ml


 


Balance 620 ml











Laboratory Data


Labs 24H


Laboratory Tests 2


9/20/17 05:40: 


White Blood Count 12.1H, Red Blood Count 4.87, Hemoglobin 14.8, Hematocrit 44.0

, Mean Corpuscular Volume 90.4, Mean Corpuscular Hemoglobin 30.3, Mean 

Corpuscular Hemoglobin Concent 33.6, Red Cell Distribution Width 14.2, Platelet 

Count 211, Neutrophils (%) (Auto) 79.4H, Lymphocytes (%) (Auto) 8.2L, Monocytes 

(%) (Auto) 8.2H, Eosinophils (%) (Auto) 1.0, Basophils (%) (Auto) 0.5, 

Neutrophils # (Auto) 9.6H, Lymphocytes # (Auto) 1.0L, Monocytes # (Auto) 1.0H, 

Eosinophils # (Auto) 0.1, Basophils # (Auto) 0.1, Large Unclassified Cells % 2.6

, Large Unclassified Cells # 0.3, Prothrombin Time 26.6H, Prothromb Time 

International Ratio 2.35, Anion Gap 5L, Glomerular Filtration Rate > 60.0, 

Blood Urea Nitrogen 20H, Creatinine 0.78, Sodium Level 137, Potassium Level 3.4L

, Chloride Level 97L, Carbon Dioxide Level 35H, Calcium Level 9.5, Magnesium 

Level 2.3


CBC/BMP


Laboratory Tests


9/20/17 05:40








Red Blood Count 4.87, Mean Corpuscular Volume 90.4, Mean Corpuscular Hemoglobin 

30.3, Mean Corpuscular Hemoglobin Concent 33.6, Red Cell Distribution Width 14.2

, Neutrophils (%) (Auto) 79.4 H, Lymphocytes (%) (Auto) 8.2 L, Monocytes (%) (

Auto) 8.2 H, Eosinophils (%) (Auto) 1.0, Basophils (%) (Auto) 0.5, Neutrophils 

# (Auto) 9.6 H, Lymphocytes # (Auto) 1.0 L, Monocytes # (Auto) 1.0 H, 

Eosinophils # (Auto) 0.1, Basophils # (Auto) 0.1, Calcium Level 9.5





Discharge Medications


Scheduled


Calcium/Vitamin D (Oyster Shell Calcium + -200 mg-Unit) 1 Tab Tab, 1 TAB 

PO TID, (Reported)


Cholecalciferol (Vitamin D) 5,000 Unit Tab, 5,000 UNIT PO DAILY, (Reported)


   TAKES AT NOON 


Docusate Sodium (Docusate Sodium) 100 Mg Cap, 100 MG PO QHS, (Reported)


Multivitamins *[facility] STOCKED* (Thera M Plus *[facility] STOCKED*) 1 Tab Tab, 1 TAB PO QHS

, (Reported)


Potassium Chloride (K-Tab) 20 Meq Tab, 20 MEQ PO DAILY, (Reported)


Salmeterol/Fluticasone (Advair Diskus 100-50 Mcg/Dose) 28 Puff/Inhaler Aerp, 1 

PUFF INH BID, (Reported)


Simvastatin - High Dose (Zocor) 40 Mg Tab, 40 MG PO QHS, (Reported)


Torsemide (Torsemide) 100 Mg Tab, 100 MG PO ASDIRECTED, (Reported)


   NORMALLY, TAKES ONCE A DAY; BEEN TAKING IT BID THIS WEEK PER DOCTOR. TAKES 

IN MORNING AND NOON. 


Warfarin Sod (Coumadin) 3 Mg Tab, 3 MG PO QPM, (Reported)


   TAKES AT DINNERTIME 





Scheduled PRN


Albuterol/Ipratropium (Ipratropium Bromide/Albut 0.5-2.5 (3) mg/3Ml) 1 Sol Sol, 

1 SOL INH QID PRN for SHORTNESS OF BREATH, (Reported)


Tramadol HCl (Tramadol HCl) 50 Mg Tab, 50 MG PO Q6HP PRN for MODERATE PAIN (PS 5

-7)





Allergies


Coded Allergies:  


     Chlorpromazine (Verified  Allergy, Intermediate, RASH, 4/8/13)


     Penicillins (Verified  Allergy, Intermediate, RASH, 4/8/13)


     Penicillins Cross Reactors (Verified  Allergy, Intermediate, RASH, 4/8/13)


     Codeine (Verified  Adverse Reaction, Mild, VOMITTING, 4/8/13)





GME ATTESTATION


GME ATTESTATION


My preceptor for this patient encounter was physically present in the building 

during the encounter and was fully available. As needed, all aspects of the 

patient interview, examination, medical decision making process, and medical 

care plan development were reviewed and approved by the preceptor. Preceptor is 

aware and concurs with the plan as stated in the body of this note and will 

attest to such by his/her cosignature.











LINDA REDMAN DO Sep 20, 2017 11:21

## 2017-10-03 ENCOUNTER — HOSPITAL ENCOUNTER (OUTPATIENT)
Dept: HOSPITAL 53 - M LAB REF | Age: 82
End: 2017-10-03
Attending: NURSE PRACTITIONER
Payer: MEDICARE

## 2017-10-03 DIAGNOSIS — G30.1: Primary | ICD-10-CM

## 2017-10-03 LAB
FOLATE SERPL-MCNC: 18 NG/ML
VIT B12 SERPL-MCNC: 605 PG/ML

## 2019-09-07 NOTE — HPEPDOC
[facility] Medical History & Physical


Date of Admission


Sep 7, 2019


Date of Service:  Sep 7, 2019


Primary Care Physician:  Jr Telles Collins


Attending Physician:  KAYLIE DELAROSA MD





History and Physical


TIME OF SERVICE: 950pm





The patient is a poor historian. The HPI was obtained from the patient's 

daughter and ED attending





CHIEF COMPLAINT: Bloody urine





HISTORY OF PRESENT ILLNESS: 


This is an 84-year-old female who was brought to the ED today for evaluation of 

hematuria. According to the patient's daughter earlier during the day the 

patient's  noticed that she kept on going to the bathroom. She complained

of pain when she urinated one time and thereafter developed cady blood in the 

urine. Currently the patient continues to have polyuria but the dysuria has 

resolved. She denies having abdominal pain, denies having back pain, denies 

having fevers, and denies having chills. According to the patient's daughter who

corroborated the review of systems, in the ED attending the patient had right-

sided CVA tenderness.





REVIEW OF SYSTEMS: 12 point review of systems negative except as listed in HPI





PAST MEDICAL/ SURGICAL HISTORY:


Afib w Mitral Valve Prosthesis (Coumadin)


Alzheimer's Dementia 


Chronic Diastolic CHF / Chronic HTN


Hypothyroidism


COPD


CKD 3 


Dyslipidemia


Unsteady gait/uses a walker to ambulate.


Status post pacemaker placement





SOCIAL HISTORY:


Quit smoking, smoked half a pack for 20 years.


His .


The patient is dependent on her  and her daughter for assistance with 

IADLs





FAMILY HISTORY:


Patient's daughter denies family history of cancer.





ALLERGIES: Please see below.





HOME MEDICATIONS: Please see below. 





PHYSICAL EXAMINATION:


VITAL SIGNS:  Please see below.


GENERAL APPEARANCE: Well-nourished, well-developed, not in apparent distress, 

does not appear toxic


HEENT: Normocephalic, atraumatic, mucous members moist and pink


CARDIOVASCULAR: Regular rate and rhythm. No murmurs, rubs or gallops


LUNGS: Clear to auscultation bilaterally on room air


ABDOMEN: The abdomen is soft and nontender on palpation. Bowel sounds are 

hypoactive


MUSCULOSKELETAL: Age of motion is intact in all 4 extremities


INTEGUMENT: Skin does not appear flushed and diaphoretic


NEUROLOGICAL: Cranial nerves II-12 grossly intact. Speech is not dysarthric


PSYCHIATRIC: Alert and oriented to person, place and time, able to understand 

and follow commands





LABORATORY DATA: See below.





IMAGING: 


CT of the abdomen showed an enlarged right atrium, pacemaker, cholelithiasis, 

possible antral gastritis, moderate left renal atrophy with parenchymal 

thickening, chronic diverticulosis, and bladder wall thickening with 

perivascular induration 





MICROBIOLOGY: Please see below. 





ASSESSMENT: 


Ms. Basilio is an 10-year-old female with a past medical history of atrial 

fibrillation, prosthetic mitral valve, chronic diastolic congestive heart 

failure, chronic hypertension, CKD 3, COPD, hypothyroidism and Alzheimer's 

dementia who will be admitted for management of pyelonephritis.





PLAN:


1.Pyelonephritis


-symptoms include CVA tenderness and dysuria


-WBC # elevated


-UA showed 3+ blood, leukocyte esterase and WBCs and bacteria


- Cr & BUN slightly elevated 


Plan: admit to [facility] / f/u Ucx, blood Cx and renal function / switch to PO 

Ciprofloxacin tomorrow





2. Hematuria


CT showed bladder wall thicknening that could be due to UTI or malignancy


I discussed this finding with the patient's daughter will talk to the the 

patient's father about whether they want urology eval 


Plan: day time team can f/u w patients  to determine if they want Uro 

eval / cystocscopy to r/o cancer





3. Afib w Mitral Valve Prosthesis (Coumadin)


INR 2.3 


Plan: c/w home meds





4.Chronic Diastolic CHF /Chronic  HTN


Clinically compensated.


Plan: Control blood pressure /Continue home meds





5.COPD


Stable


Plan: Continue home meds





6. CKD 3 


Plan: f/u BMP avoid NSAIDs





7. Hypothyroidism


Plan: Continue home meds





8. Alzheimer's Dementia.


Plan: Patient's family will spend the night and try to be present during the 

hospitalization /continue home meds





DVT px w SCDs bc of hematuria





Dispo pending clinical course





Vital Signs





Vital Signs








  Date Time  Temp Pulse Resp B/P (MAP) Pulse Ox O2 Delivery O2 Flow Rate FiO2


 


9/7/19 19:54 97.8 63 16 149/67 (94) 96 Room Air  











Laboratory Data


Labs 24H


Laboratory Tests 2


9/7/19 16:13: 


Urine Color REDH, Urine Appearance CLOUDYH, Urine pH 8.0, Urine Specific Gravity

1.008, Urine Protein 2+H, Urine Glucose (UA) NEGATIVE, Urine Ketones NEGATIVE, 

Urine Blood 3+H, Urine Nitrite NEGATIVE, Urine Bilirubin NEGATIVE, Urine 

Urobilinogen 0.2, Urine Leukocyte Esterase 1+H, Urine WBC (Auto) 68H, Urine RBC 

(Auto) TNTCH, Urine Hyaline Casts (Auto) 0, Urine Bacteria (Auto) 1+H, Urine 

Squamous Epithelial Cells 0, Urine Sperm (Auto) 


9/7/19 17:41: 


Immature Granulocyte % (Auto) 0.5, White Blood Count 16.0H, Red Blood Count 

4.54, Hemoglobin 13.6, Hematocrit 41.4, Mean Corpuscular Volume 91.2, Mean 

Corpuscular Hemoglobin 30.0, Mean Corpuscular Hemoglobin Concent 32.9, Red Cell 

Distribution Width 15.2H, Platelet Count 211, Neutrophils (%) (Auto) 77.1H, 

Lymphocytes (%) (Auto) 6.1L, Monocytes (%) (Auto) 13.5H, Eosinophils (%) (Auto) 

2.1, Basophils (%) (Auto) 0.7, Neutrophils # (Auto) 12.3H, Lymphocytes # (Auto) 

1.0L, Monocytes # (Auto) 2.2H, Eosinophils # (Auto) 0.3, Basophils # (Auto) 0.1,

Nucleated Red Blood Cells % (auto) 0.0, Prothrombin Time 23.6H, Prothromb Time 

International Ratio 2.13, Activated Partial Thromboplast Time 34.8


9/7/19 17:53: 


POC Glucose (Misc Panel) 106H, POC Sodium (Misc Panel) 135L, POC Potassium (Misc

Panel) 4.1, POC Chloride (Misc Panel) 95L, POC Total CO2 (Misc Panel) 32.0H, POC

Blood Urea Nitrogen (Misc Panel 27H, POC Ionized Calcium (Misc Panel) 4.8, POC 

Creatinine (Misc Panel) 1.4H, POC Hematocrit (Misc Panel) 42.0


CBC/BMP


Laboratory Tests


9/7/19 17:41








Red Blood Count 4.54, Mean Corpuscular Volume 91.2, Mean Corpuscular Hemoglobin 

30.0, Mean Corpuscular Hemoglobin Concent 32.9, Red Cell Distribution Width 15.2

H, Neutrophils (%) (Auto) 77.1 H, Lymphocytes (%) (Auto) 6.1 L, Monocytes (%) 

(Auto) 13.5 H, Eosinophils (%) (Auto) 2.1, Basophils (%) (Auto) 0.7, Neutrophils

# (Auto) 12.3 H, Lymphocytes # (Auto) 1.0 L, Monocytes # (Auto) 2.2 H, 

Eosinophils # (Auto) 0.3, Basophils # (Auto) 0.1


Microbiology





Microbiology


9/7/19 Urine Culture, Received


         Pending





Home Medications


Scheduled


Calcium Carbonate/Vitamin D3 (Calcium 500-Vit D3 200 Caplet) 1 Each Tablet, 1 

TAB PO TID


Cholecalciferol (Vitamin D3) (Vitamin D3) 5,000 Unit Capsule, 5,000 UNIT PO 

DAILY


   TAKES AT NOON 


Docusate Sodium (Colace) 100 Mg Capsule, 100 MG PO BID


Donepezil HCl (Aricept) 5 Mg Tab, 5 MG PO QHS


Levothyroxine Sodium (Synthroid) 25 Mcg Tab, 25 MCG PO DAILY


   TAKES AT 0700 


Multivitamin (Multivitamins) 1 Each Capsule, 1 CAP PO QHS


Potassium Chloride (Potassium Chloride) 10 Meq Tab.er.prt, 10 MEQ PO BID


   TAKES 0800 AND 1800 


Salmeterol/Fluticasone (Advair 100-50 Diskus) 28 Puff/Inhaler Aerp, 1 PUFF INH 

BID


Simvastatin (Simvastatin) 40 Mg Tab, 40 MG PO QHS


Spironolactone (Spironolactone) 25 Mg Tab, 25 MG PO DAILY


Torsemide (Torsemide) 100 Mg Tab, 50 MG PO BID


   TAKES 0800 AND 1200 


Umeclidinium Bromide (Incruse Ellipta) 62.5 Mcg Blst.w.dev, 1 PUFF PO QPM


Warfarin Sodium (Warfarin Sodium) 3 Mg Tab, 3 MG PO QPM


   TAKES AT 1800 





Allergies


Coded Allergies:  


     Penicillins (Verified  Allergy, Intermediate, rash, 9/7/19)


     chlorpromazine (Verified  Allergy, Unknown, 9/7/19)


     codeine (Verified  Adverse Reaction, Unknown, vomiting, 9/7/19)





A-FIB/CHADSVASC


A-FIB History


Current/History of A-Fib/PAF?:  Yes


Current PO Anticoag Therapy:  Yes





Age/Risk Factor Scoring


CHADSVASC:  








CHADSVASC Response (Comments) Value


 


Age Risk Factor Age >/= 75 years old 2


 


Gender Risk Factor Female 1


 


Hx of CHF Yes 1


 


Hx of HTN Yes 1


 


Hx of Stroke/TIA/or VTE No 0


 


Hx of Diabetes No 0


 


Hx of Vascular Disease No 0


 


Total  5











Treatment


Treatment ordered:  KAYLIE Kohli MD                 Sep 7, 2019 21:31

## 2019-09-07 NOTE — REPVR
EXAM: 

CT Abdomen and Pelvis With Contrast 



EXAM DATE/TIME: 

9/7/2019 7:38 PM 



CLINICAL HISTORY: 

84 years old, female; Abdominal pain; Tenderness; Other: CVA; Additional info: 

UTI, elev. Wbc, CVA tender 



TECHNIQUE: 

Imaging protocol: Computed tomography of the abdomen and pelvis with 

intravenous contrast. 

Radiation optimization: All CT scans at this facility use at least one of these 

dose optimization techniques: automated exposure control; mA and/or kV 

adjustment per patient size (includes targeted exams where dose is matched to 

clinical indication); or iterative reconstruction. 

Contrast material: ISOVUE 370; Contrast volume: 100 ml; Contrast route: IV;  



COMPARISON: 

CT ABD PELVIS W/O CONTRAST 7/26/2018 5:56 AM 



FINDINGS: 

Tubes, catheters and devices: Pacemaker in position. 

Lungs: Minimal bibasilar interstitial prominence. 

Heart: Prominent enlargement of the right atrium. 



Liver: The liver attenuation is 81 Hounsfield units and the spleen is 106 

Hounsfield units. 

Gallbladder and bile ducts: There is a gallstone in the gallbladder measuring 

17 mm. 

Pancreas: Normal. No ductal dilation. 

Spleen: Splenic calcifications. 

Adrenals: Normal. No mass. 

Kidneys and ureters: Moderate left renal atrophy with parenchymal thinning, 

particularly the upper pole. There is a left renal cyst measuring up to 20 mm. 

Stomach and bowel: Slight wall thickening of the gastric antrum and pylorus. 

Colonic diverticulosis without diverticulitis. 

Appendix: A normal retrocecal appendix is seen. 

Intraperitoneal space: Unremarkable. No free air. No significant fluid 

collection. 

Vasculature: There is moderate atherosclerotic calcification of the abdominal 

aorta with extension into the iliac arteries. 

Lymph nodes: Unremarkable. No enlarged lymph nodes. 



Bladder: There is bladder wall thickening, however, the bladder is nondistended 

and is nonspecific. There is some perivesicular induration, however. Facet 

arthropathy of lower lumbar spine. 

Reproductive: Unremarkable as visualized. 

Bones/joints: Degenerative changes of the hips, right greater than left. 

Soft tissues: Unremarkable. 



IMPRESSION: 

1. Enlarged right atrium and pacemaker in position which is unchanged from 

7/26/2018. 

2. Cholelithiasis 

3. Question of antral gastritis which is similar. 

4. Moderate left renal atrophy with parenchymal thinning. 

5. Colonic diverticulosis without diverticulitis. 

6. There is bladder wall thickening, however, the bladder is nondistended and 

is nonspecific. There is perivesicular induration and cystitis is not excluded. 



Electronically signed by: Thomas Arevalo On 09/07/2019  20:28:11 PM

## 2019-09-08 NOTE — IPNPDOC
Date Seen


The patient was seen on 9/8/19.





Progress Note


SUBJECTIVE: Patient denies any complaints at this time, she is feeling good 

otherwise patient denies chest pain, shortness breath, nausea, vomiting, fevers,

chills. Patient is oriented to person but not place time or situation which is 

her baseline and as such much of the history is obtained from the patient's 

daughter who lives with her at his bedside. Patient has had hematuria for one 

day as well as frequent urination and some dysuria with being unable to void at 

times yesterday prompting her to present to the emergency room yesterday.





OBJECTIVE


PHYSICAL EXAMINATION:


VITAL SIGNS: Please see below. 


GENERAL: Pleasant elderly  female sitting up in bed  no acute distress 

feeding herself breakfast


HEENT: Moist mucous membranes no elevation in CVP


CARDIOVASCULAR: S1 S2 regular no additional heart sounds appreciated.


RESPIRATORY: Clear to auscultation bilaterally.


ABDOMINAL: Bowel sounds present abdomen soft and nontender CVA tenderness 

positive more so on the left than the right


EXTREMITIES: No clubbing cyanosis or edema


NEUROLOGICAL: Spontaneously moves all 4 extremities cranial 2 through 12 grossly

intact no gross focal deficits appreciated


PSYCHOLOGICAL: Appropriate but disoriented





LABORATORY DATA, MICROBIOLOGY: Please see below.





IMAGING STUDIES:


CT abdomen and pelvis:1. Enlarged right atrium and pacemaker in position which 

is unchanged from 


7/26/2018. 


2. Cholelithiasis 


3. Question of antral gastritis which is similar. 


4. Moderate left renal atrophy with parenchymal thinning. 


5. Colonic diverticulosis without diverticulitis. 


6. There is bladder wall thickening, however, the bladder is nondistended and 


is nonspecific. There is perivesicular induration and cystitis is not excluded. 





ASSESSMENT AND PLAN: This is a 84-year-old female with pyelonephritis





PROBLEMS:


1. Pyelonephritis: History is difficult given the patient's presentation however

her family is quite reliable she's had increased urinations as well as 

complained of some intermittent difficulty of urination and hematuria within the

last 24 hours. UA is abnormal is mostly blood but certainly could be taken to be

suggestive of a urinary tract infection in the setting. As such he did receive 1

dose of ceftriaxone is currently on ciprofloxacin which we will continue she 

appears to be tolerating it quite well. We'll monitor her urine for improvement 

of ulcerative hemoglobin as she did reportedly have quite significant bleeding. 

My suspicion for any anemia is quite low though. It is possible that her 

bleeding secondary to anticoagulation and underlying bladder tumor she did have 

some bladder wall thickness however I would treat her for urinary tract 

infection and see if her symptoms resolved without any recurrence prior to 

further investigations or cystoscopy and I did discuss at length with the family

bedside. Follow culture data should likely complete 2 weeks of antibiotics





2. Atrial fibrillation: She is rate controlled she is anticoagulated. She is 

status post pacer





3. Alzheimer's type dementia: She is at her baseline cognitive status. Continue 

with Aricept





4. Hypothyroidism: Continue with Synthroid





5. COPD: Patient is at her baseline respiratory status, continue with Advair and

umeclidinium 





6. Chronic kidney disease: Stable





7. Dyslipidemia: Resume statin upon discharge





8. 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 

valve





9. Diastolic congestive heart failure: Well compensated at this time, continue 

with torsemide and potassium supplementation





10. Hypertension: Continue spironolactone, controlled





DVT prophylaxis: Coumadin





DISPOSITION: Possibly home in the next 24 hours.





VS, I&O, 24H, [facility]


Vital Signs/I&O





Vital Signs








  Date Time  Temp Pulse Resp B/P (MAP) Pulse Ox O2 Delivery O2 Flow Rate FiO2


 


9/8/19 06:00 98.0 60 18 125/57 (79) 96   


 


9/7/19 19:54      Room Air  














I&O- Last 24 Hours up to 6 AM 


 


 9/8/19





 06:00


 


Intake Total 650 ml


 


Balance 650 ml











Laboratory Data


24H LABS


Laboratory Tests 2


9/7/19 16:13: 


Urine Color REDH, Urine Appearance CLOUDYH, Urine pH 8.0, Urine Specific Gravity

1.008, Urine Protein 2+H, Urine Glucose (UA) NEGATIVE, Urine Ketones NEGATIVE, 

Urine Blood 3+H, Urine Nitrite NEGATIVE, Urine Bilirubin NEGATIVE, Urine 

Urobilinogen 0.2, Urine Leukocyte Esterase 1+H, Urine WBC (Auto) 68H, Urine RBC 

(Auto) TNTCH, Urine Hyaline Casts (Auto) 0, Urine Bacteria (Auto) 1+H, Urine 

Squamous Epithelial Cells 0, Urine Sperm (Auto) 


9/7/19 17:41: 


Immature Granulocyte % (Auto) 0.5, White Blood Count 16.0H, Red Blood Count 

4.54, Hemoglobin 13.6, Hematocrit 41.4, Mean Corpuscular Volume 91.2, Mean 

Corpuscular Hemoglobin 30.0, Mean Corpuscular Hemoglobin Concent 32.9, Red Cell 

Distribution Width 15.2H, Platelet Count 211, Neutrophils (%) (Auto) 77.1H, 

Lymphocytes (%) (Auto) 6.1L, Monocytes (%) (Auto) 13.5H, Eosinophils (%) (Auto) 

2.1, Basophils (%) (Auto) 0.7, Neutrophils # (Auto) 12.3H, Lymphocytes # (Auto) 

1.0L, Monocytes # (Auto) 2.2H, Eosinophils # (Auto) 0.3, Basophils # (Auto) 0.1,

Nucleated Red Blood Cells % (auto) 0.0, Prothrombin Time 23.6H, Prothromb Time I

nternational Ratio 2.13, Activated Partial Thromboplast Time 34.8


9/7/19 17:53: 


POC Glucose (Misc Panel) 106H, POC Sodium (Misc Panel) 135L, POC Potassium (Misc

Panel) 4.1, POC Chloride (Misc Panel) 95L, POC Total CO2 (Misc Panel) 32.0H, POC

Blood Urea Nitrogen (Misc Panel 27H, POC Ionized Calcium (Misc Panel) 4.8, POC 

Creatinine (Misc Panel) 1.4H, POC Hematocrit (Misc Panel) 42.0


9/7/19 21:59: Lactic Acid Level 1.0


9/8/19 06:36: 


Nucleated Red Blood Cells % (auto) 0.0, Prothrombin Time 23.6H, Prothromb Time 

International Ratio 2.13, Anion Gap 6L, Glomerular Filtration Rate 52.8, Blood 

Urea Nitrogen 21H, Creatinine 1.06, Sodium Level 138, Potassium Level 3.6, 

Chloride Level 99, Carbon Dioxide Level 33H, Calcium Level 9.5


CBC/BMP


Laboratory Tests


9/7/19 17:41








Red Blood Count 4.54, Mean Corpuscular Volume 91.2, Mean Corpuscular Hemoglobin 

30.0, Mean Corpuscular Hemoglobin Concent 32.9, Red Cell Distribution Width 15.2

H, Neutrophils (%) (Auto) 77.1 H, Lymphocytes (%) (Auto) 6.1 L, Monocytes (%) 

(Auto) 13.5 H, Eosinophils (%) (Auto) 2.1, Basophils (%) (Auto) 0.7, Neutrophils

# (Auto) 12.3 H, Lymphocytes # (Auto) 1.0 L, Monocytes # (Auto) 2.2 H, 

Eosinophils # (Auto) 0.3, Basophils # (Auto) 0.1





9/8/19 06:36








Red Blood Count 4.18, Mean Corpuscular Volume 89.2, Mean Corpuscular Hemoglobin 

29.2, Mean Corpuscular Hemoglobin Concent 32.7, Red Cell Distribution Width 15.3

H, Calcium Level 9.5


Microbiology





Microbiology


9/7/19 Blood Culture, Received


         Pending


9/7/19 Blood Culture, Received


         Pending


9/7/19 Urine Culture, Received


         Pending











ADAMA THORNE MD               Sep 8, 2019 12:51

## 2019-09-09 NOTE — DS.PDOC
Discharge Summary


General


Date of Admission


Sep 7, 2019 at 15:07


Date of Discharge


9/9/19


Primary Care Physician:  Jr Telles Collins


Attending Physician:  ADAMA THORNE MD





Discharge Summary


PROCEDURES PERFORMED DURING STAY: None.





ADMITTING/DISCHARGE DIAGNOSES:


1. Pyelonephritis


2. Atrial fibrillation


3. Alzheimer's type dementia


4. Hypothyroidism


5. Chronic obstructive pulmonary disease


6. Chronic kidney disease


7. Dyslipidemia


8. Mitral valve disease


9. Diastolic congestive heart failure


10. Hypertension





COMPLICATIONS/CHIEF COMPLAINT: Blood in urine





HISTORY OF PRESENT ILLNESS/HOSPITAL COURSE: Patient is an 84-year-old female who

presented to the emergency room 2 days prior to discharge with hematuria. 

Patient was going to the bathroom more frequently throughout the day when she 

came in the emergency room. In the emergency Department patient continue to have

polyuria but she was having no pain or difficulty with urination at that time. 

Patient was found to have right-sided CVA tenderness. Patient was admitted into 

the hospital for treatment of pyelonephritis due to her positive urinary 

analysis for infection in her positive CVA tenderness. Patient was treated with 

ciprofloxacin. Patient was monitored in the hospital for 2 days and on the 

second day, patient was deemed ready for discharge. Patient was discharged home 

with family.





DISCHARGE MEDICATIONS: Please see below.





ALLERGIES: Please see below.





PHYSICAL EXAMINATION ON DISCHARGE:


Vitals: (see below) 


General: No acute distress, laying comfortably in bed.


HEENT: Moist mucous membranes.


Neck: No JVD or lymphadenopathy


Cardiac: RRR, mechanical valve could be heard loudest over the fifth intercostal

space midclavicular line.


Pulm: Clear to auscultation b/l. No wheezing, rhonchi


Abd: NT/ND + BS, mild right-sided CVA tenderness.


Ext: No edema or cyanosis


LABORATORY DATA: Please see below.


IMAGING: A CT of the abdomen and pelvis with IV contrast performed on 09/07/2018

showed enlarged right atrium and pacemaker in position which is unchanged from 

07/26/2018, cholelithiasis, question of antral gastritis which is similar, 

moderate left renal atrophy with parenchymal thinning, colonic diverticulosis 

without diverticulitis, there is bladder wall thickening however the bladder is 

nondistended and is nonspecific. There is perivesicular induration and cystitis 

is not excluded





PROGNOSIS: Fair





ACTIVITY: As tolerated.





DIET: Regular





DISCHARGE PLAN/DISPOSITION: Discharge home with family





DISCHARGE INSTRUCTIONS:


1. Follow-up with primary care provider within 5-10 days.


2. Continue ciprofloxacin 250 mg twice a day for the next 12 days to complete a 

14 day course.


3. Urinary analysis should be repeated in about 4-6 weeks to monitor for 

resolution of hematuria.


4. Return to the emergency department if symptoms worsen.





DISCHARGE CONDITION: Stable.





I saw and evaluated the patient. I agree with the findings and plan of care as 

documented in the documenters note. I spent 45 minutes coordinating this 

patient's discharge.





Vital Signs/I&Os





Vital Signs








  Date Time  Temp Pulse Resp B/P (MAP) Pulse Ox O2 Delivery O2 Flow Rate FiO2


 


9/9/19 06:00 97.2 58 18 144/63 (90) 93   


 


9/7/19 19:54      Room Air  














I&O- Last 24 Hours up to 6 AM 


 


 9/9/19





 06:00


 


Intake Total 960 ml


 


Balance 960 ml











Laboratory Data


Labs 24H


Laboratory Tests 2


9/9/19 06:26: 


Nucleated Red Blood Cells % (auto) 0.0, Prothrombin Time 21.3H, Prothromb Time 

International Ratio 1.87, Anion Gap 6L, Glomerular Filtration Rate 47.6, Blood 

Urea Nitrogen 18, Creatinine 1.16, Sodium Level 140, Potassium Level 3.5, 

Chloride Level 102, Carbon Dioxide Level 32, Calcium Level 9.0


CBC/BMP


Laboratory Tests


9/9/19 06:26








Red Blood Count 4.11, Mean Corpuscular Volume 90.5, Mean Corpuscular Hemoglobin 

29.9, Mean Corpuscular Hemoglobin Concent 33.1, Red Cell Distribution Width 15.2

H, Calcium Level 9.0





Microbiology





Microbiology


9/7/19 Blood Culture - Preliminary, Resulted


         No growth after 24 hours . All specim...


9/7/19 Blood Culture - Preliminary, Resulted


         No growth after 24 hours . All specim...


9/7/19 Urine Culture - Final, Complete


         Escherichia Coli





Discharge Medications


Scheduled


Calcium Carbonate/Vitamin D3 (Calcium 500-Vit D3 200 Caplet) 1 Each Tablet, 1 

TAB PO TID, (Reported)


Cholecalciferol (Vitamin D3) (Vitamin D3) 5,000 Unit Capsule, 5,000 UNIT PO 

DAILY, (Reported)


   TAKES AT NOON 


Ciprofloxacin HCl (Cipro) 250 Mg Tablet, 250 MG PO BID@0600,1800


Docusate Sodium (Colace) 100 Mg Capsule, 100 MG PO BID, (Reported)


Donepezil HCl (Aricept) 5 Mg Tab, 5 MG PO QHS, (Reported)


Levothyroxine Sodium (Synthroid) 25 Mcg Tab, 25 MCG PO DAILY, (Reported)


   TAKES AT 0700 


Multivitamin (Multivitamins) 1 Each Capsule, 1 CAP PO QHS, (Reported)


Potassium Chloride (Potassium Chloride) 10 Meq Tab.er.prt, 10 MEQ PO BID, 

(Reported)


   TAKES 0800 AND 1800 


Salmeterol/Fluticasone (Advair 100-50 Diskus) 28 Puff/Inhaler Aerp, 1 PUFF INH 

BID, (Reported)


Simvastatin (Simvastatin) 40 Mg Tab, 40 MG PO QHS, (Reported)


Spironolactone (Spironolactone) 25 Mg Tab, 25 MG PO DAILY, (Reported)


Torsemide (Torsemide) 100 Mg Tab, 50 MG PO BID, (Reported)


   TAKES 0800 AND 1200 


Umeclidinium Bromide (Incruse Ellipta) 62.5 Mcg Blst.w.dev, 1 PUFF PO QPM, 

(Reported)


Warfarin Sodium (Warfarin Sodium) 3 Mg Tab, 3 MG PO QPM, (Reported)


   TAKES AT 1800 





Allergies


Coded Allergies:  


     Penicillins (Verified  Allergy, Intermediate, rash, 9/7/19)


     chlorpromazine (Verified  Allergy, Unknown, 9/7/19)


     codeine (Verified  Adverse Reaction, Unknown, vomiting, 9/7/19)











IMMANUEL DANGELO DO                Sep 9, 2019 14:23


ADAMA THORNE MD              Sep 17, 2019 12:09

## 2019-09-20 NOTE — REP
CT BRAIN WITHOUT IV CONTRAST:

 

CT brain performed without IV contrast.  There is moderate atrophy.  There is no

midline shift or mass effect.  Mild periventricular small vessel ischemic changes

are present.  There is no acute intracranial hemorrhage or extra-axial fluid

collection.  Basal ganglia calcifications are seen bilaterally.  There are

vascular calcifications in the carotid siphons.  No fracture is seen.

 

IMPRESSION:

 

Chronic atrophy.  No acute intracranial hemorrhage or skull fracture.

 

 

Electronically Signed by

Sawyer Seay MD 09/20/2019 06:53 P

## 2019-09-20 NOTE — REP
The of the cervical spine:

Axial images are acquired helical scanning and a reformatted sagittal coronal

projections.

There are no comparisons.

There is demineralization.

Vertebral body heights are normal.  No vertebral body compression deformities.

There is degenerative disc disease throughout the cervical spine.

The prevertebral soft tissues are unremarkable.

The facet articulations of C3- C4-C5 appear fused bilaterally.

I suspect the distal tip of the L5 spinous process is in a avulsed.  This could

be acute or chronic.

Is 1 - 2 mm of anterolisthesis of the C4 vertebral body on C5 and there is

reversal of the cervical lordosis.  This could be positional, from muscular spasm

or secondary to the used facets at C 03-05 bilaterally.

 

The skull base, C1, C2 is unremarkable except for osteoarthritis.

 

There are no posterior element fractures except for the avulsed distal tip  of

the C5 minus process , acute versus chronic.

 

Impression:

The distal tip of the C5 spinous process appears an avulsed.  This could be acute

or chronic.  No other fracture is identified.

 

There is one and 2 mm anterolisthesis of C four on five and reversal of the

cervical lordosis.  This could be positional, degenerative or secondary to the

fused posterior facets at C3-C5, or could be a combination of these factors.

Graph there is demineralization.

 

 

Electronically Signed by

Sawyer Pantoja MD 09/20/2019 02:37 P

## 2019-09-24 NOTE — REP
CT BRAIN WITHOUT CONTRAST:

 

HISTORY:  Head injury in a fall.

 

Comparison head CT study September 20, 2019 and September 18, 2019.

 

CT FINDINGS:  Preliminary digital  radiograph is unremarkable.  On bone

window settings, there is no evidence of skull fracture.  No significant scalp

hematoma is appreciated.  No acute intraorbital abnormality is seen.

 

On soft tissue window settings, there is a generalized cerebral atrophy and

cerebellar atrophy unchanged.  There is physiologic calcification of the basal

ganglia bilaterally.  Small vessel changes are seen in the periventricular white

matter as before.  There is some vascular calcification in the distal internal

carotid arteries.  There is no evidence of acute intracranial hemorrhage,

infarct, mass or midline shift.

 

IMPRESSION:

 

Generalized atrophy, small vessel changes, vascular calcification.  No acute

intracranial abnormality.

 

 

Electronically Signed by

Ugo Mcgee MD 09/24/2019 04:07 P

## 2020-10-20 NOTE — REPVR
PROCEDURE INFORMATION: 

Exam: XR Chest, 1 View 

Exam date and time: 10/20/2020 9:20 AM 

Age: 85 years old 

Clinical indication: Other: Altered mental status 



TECHNIQUE: 

Imaging protocol: XR of the chest 

Views: Frontal portable sitting upright view of the chest. 



COMPARISON: 

[facility] PORTABLE CHEST X-RAY 1/17/2018 4:10 PM 



FINDINGS: 

Tubes, catheters and devices: The patient is status post median sternotomy with 

intact sternal cerclage wires. 



Lungs: The pulmonary vasculature is less congested and better defined. Stable 

right middle lobe mild subsegmental scarring. Mild right lateral basilar 

subsegmental atelectasis. 

Pleural space: No pleural effusion. No pneumothorax. 

Heart/Mediastinum: Mediastinum: Stable. Poorly visualized mitral valve 

replacement. Stable left sided left atrial calcifications. Stable marked 

cardiomegaly. 

Vasculature: A single lead left subclavian permanent pacemaker is present. The 

right ventricular lead appears to be in good position. Mild tortuosity of the 

descending thoracic aorta. 

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 

visualized. Moderate aortic arch and descending thoracic aortic atherosclerotic 

calcification without ectasia. 



IMPRESSION: 

1. Improved pulmonary vascular congestion. 

2. Mild right lateral basilar subsegmental atelectasis. 



Electronically signed by: Matt Lee On 10/20/2020  09:48:54 AM

## 2020-10-20 NOTE — HPEPDOC
[facility] Medical History & Physical


Date of Admission


Oct 20, 2020


Date of Service:  Oct 20, 2020





History and Physical


Chief complaint:


Presented to the hospital with worsening confusion over the last 2 weeks





History of present illness:


Patient is an 85-year-old  female with a PMHx of Alzheimers, A. fib 

and Mitral Valve Prosthesis (on Coumadin), s/p PM, Diastolic CHF, HTN, DLP, 

COPD, Hypothyroidism, CKD3, Unsteady gait (uses a walker at baseline) who 

presented to [facility] [place] after expressing worsening confusion while

at home. 





Patient lives with her  at [facility] at [facility]. Patients 

had noted that shes been experiencing some level of confusion over last 2 

weeks. Over the last 2 days. She didnt progressively getting weaker. Yesterday 

she had a loss of appetite.





History was acquired from daughter, Cheryl ([phone]), who was present at 

the bedside. As per daughter, patient has not experience any nausea, vomiting, 

chest pain, shortness of breath or fevers or chills while at home. She has 

experience a cough recently without any significant production of sputum. Vision

has reported abdominal pain over last 3-4 days with constipation while at home. 

Denies any urinary discomfort.





Past Medical History:


Alzheimers, A. fib and Mitral Valve Prosthesis (on Coumadin), s/p PM, Diastolic

CHF, HTN, DLP, COPD, Hypothyroidism, CKD3, Unsteady gait (uses a walker at 

baseline)





Past Surgical History:


Mitral valve replacements 1984


Pacemaker placement approximately 15 years ago


No other surgeries reported





Allergies:


See below





Medications:


See below





Family History:


- No history of malignancies 





Social History: 


- No history of alcohol or illicit drugs; as per daughter, patient had quit 

smoking in 1972; but was a smoker of 20 years at one D


- Denies recent travel or sick contacts


- Lives with  at [facility]





Review of Systems:


10 point review of systems complete, all negative otherwise stated in HPI





Physical exam:


- Vitals: BP [121/59], HR [51], RR [18], Sat [99%NC2L], Temp [98.7F]


- General: Lying in bed, No acute distress, Awake / Alert, Drowsy 


- HEENT: NC, AT, PERRLA


- CVS: Bradycardic, +S1S2


- Lungs: Fair air entry bilaterally, No wheezing / rales / rhonchi 


- Abdomen: Soft, Non-distended, left lower quadrant tenderness


- Extremities: Trace to 1+ pitting edema bilaterally, No calf tenderness


- Neuro: No focal motor or sensory deficit


- Skin: No visible rashes 





Assessment and Plan: 


Acute metabolic encephalopathy - likely 2/2 infectious etiology 


- Currently patient has had a decline in her baseline level of mentation


- Patient does have dementia at baseline; however, she is able to ambulate and 

lives at assisted living


- No focal neurologic deficits noted


- CT head 10/20: 1. Age appropriate supratentorial and infratentorial atrophy. 

2. Mild chronic white matter microvascular ischemic disease. 3. No acute 

intracranial abnormality identified.


- See below





Acute diverticulitis


- Patient is hemodynamically stable and afebrile


- Physical reveals left lower quadrant abdominal tenderness


- Leukocytosis with neutrophil predominance; No lactic acidosis


- CT abdomen / pelvis 10/20: 1. Uncomplicated sigmoid colonic diverticulitis. 2.

 Left renal benign simple cyst. 3. Severe left renal cortical scarring. 4. 

Cholelithiasis.


- CXR 10/20: 1. Improved pulmonary vascular congestion. 2. Mild right lateral 

basilar subsegmental atelectasis.


- Patient has received meropenem in the emergency room


- Will continue with ceftriaxone and Flagyl while inpatient


- Continue with pain control and anti-emetics with morphine and Zofran





Alzheimers


- c/w Donepezil 





A. fib and Mitral Valve Prosthesis (on Coumadin)


s/p PM





Diastolic CHF


- LE with trace pitting edema


- CXR with improved vascular congestion


- Will stop IV fluids within 24 hours


- Diuretics on hold for now 





HTN


- BP well controlled


- Currently not on medications 





DLP


- Currently not on medications 





COPD


- No evidence of exacerbation


- c/w inhaled therapy as ordered





Hypothyroidism


- c/w Levothyroxine 





CKD3


- Cr baseline of 1.0


- Cr slightly elevated form baseline


- Will hold diuretics


- c/w gentle IV fluid hydration for 24 hours only 





Vitamin D deficiency


- c/w Vitamin D supplementation 





Unsteady gait 


- Uses a walker at baseline


- Will consider PT evaluation within 24 hours 





Gastrointestinal prophylaxis


- Will start Protonix





DVT prophylaxis 


- Will continue full anticoagulation with Coumadin





Vital Signs





Vital Signs








  Date Time  Temp Pulse Resp B/P (MAP) Pulse Ox O2 Delivery O2 Flow Rate FiO2


 


10/20/20 12:15  51   98   


 


10/20/20 12:00    121/59 (79)    


 


10/20/20 11:59   18     


 


10/20/20 10:00      Room Air  


 


10/20/20 08:45 98.7       











Laboratory Data


Labs 24H


Laboratory Tests 2


10/20/20 08:53: 


Immature Granulocyte % (Auto) 0.6, Neutrophils (%) (Auto) 75.0H, Lymphocytes (%)

 (Auto) 5.6L, Monocytes (%) (Auto) 17.9H, Eosinophils (%) (Auto) 0.5, Basophils 

(%) (Auto) 0.4, Neutrophils # (Auto) 10.7H, Lymphocytes # (Auto) 0.8L, Monocytes

 # (Auto) 2.5H, Eosinophils # (Auto) 0.1, Basophils # (Auto) 0.1, Nucleated Red 

Blood Cells % (auto) 0.0, Urine Color YELLOW, Urine Appearance CLEAR, Urine pH 

6.0, Urine Specific Gravity 1.012, Urine Protein NEGATIVE, Urine Glucose (UA) 

NEGATIVE, Urine Ketones NEGATIVE, Urine Blood NEGATIVE, Urine Nitrite NEGATIVE, 

Urine Bilirubin NEGATIVE, Urine Urobilinogen 0.2, Urine Leukocyte Esterase 

NEGATIVE, Urine WBC (Auto) 1, Urine RBC (Auto) 1, Urine Hyaline Casts (Auto) 0, 

Urine Bacteria (Auto) NEGATIVE, Urine Squamous Epithelial Cells 0, Urine Sperm 

(Auto) , Total Bilirubin 1.2H, Direct Bilirubin 0.4H, Aspartate Amino Transf 

(AST/SGOT) 23, Alanine Aminotransferase (ALT/SGPT) 24, Alkaline Phosphatase 72, 

Ammonia < 10, Total Creatine Kinase 62, Creatine Kinase MB < 1.0, Creatine 

Kinase MB Relative Index 1.61, Troponin I < 0.02, Total Protein 6.7, Albumin 

3.7, Albumin/Globulin Ratio 1.2, Lipase 157, Thyroid Stimulating Hormone (TSH) 

3.480


10/20/20 09:28: 


POC Glucose (Misc Panel) 107H, POC Sodium (Misc Panel) 136, POC Potassium (Misc 

Panel) 3.9, POC Chloride (Misc Panel) 95L, POC Total CO2 (Misc Panel) 30.0H, POC

 Blood Urea Nitrogen (Misc Panel 30H, POC Ionized Calcium (Misc Panel) 5.1, POC 

Creatinine (Misc Panel) 1.2, POC Prothrombin Time (Misc) 31.3H, POC INR (Misc) 

2.7, POC Hematocrit (Misc Panel) 41.0


10/20/20 09:29: POC Lactate (Misc Panel) 0.90


10/20/20 09:34: POC Troponin I (Misc) 0.02


CBC/BMP


Laboratory Tests


10/20/20 08:53











Home Medications


Scheduled


Calcium Carbonate/Vitamin D3 (Calcium 500-Vit D3 200 Caplet) 1 Each Tablet, 1 TA

B PO TID


Cholecalciferol (Vitamin D3) (Vitamin D3) 125 Mcg Capsule, 125 MCG PO DAILY


   TAKES AT 1300 


Docusate Sodium (Colace) 100 Mg Capsule, 100 MG PO BID


Donepezil HCl (Aricept) 5 Mg Tab, 5 MG PO QHS


Levothyroxine Sodium (Synthroid) 25 Mcg Tab, 25 MCG PO DAILY


Multivitamin (Multivitamins) 1 Each Capsule, 1 CAP PO QHS


Potassium Chloride (Potassium Chloride) 10 Meq Tab.er.prt, 10 MEQ PO BID


   TAKES 0800 AND 1800 


Salmeterol/Fluticasone (Advair 100-50 Diskus) 28 Puff/Inhaler Aerp, 1 PUFF INH 

BID


Simvastatin (Simvastatin) 40 Mg Tab, 40 MG PO QHS


Spironolactone (Spironolactone) 25 Mg Tab, 25 MG PO DAILY


Torsemide (Torsemide) 100 Mg Tab, 50 MG PO DAILY


Umeclidinium Bromide (Incruse Ellipta) 62.5 Mcg Blst.w.dev, 1 PUFF PO QHS


Warfarin Sodium (Warfarin Sodium) 3 Mg Tab, 3 MG PO 6XWK


   QPM: MON, TUES, WED, THURS, FRI, SAT 


Warfarin Sodium (Warfarin Sodium) 3 Mg Tablet, 6 MG PO 1XWK


   QPM: SUNDAYS 





Scheduled PRN


Acetaminophen (Tylenol) 325 Mg Tablet, 650 MG PO Q6H PRN for PAIN / FEVER


Albuterol Sulf (Albuterol Sulfate) 2.5 Mg/3 Ml Vial.neb, 2.5 MG INH QID PRN for 

SOB/WHEEZING


Clarithromycin (Clarithromycin) 500 Mg Tablet, 500 MG PO ONCE PRN for PRIOR TO 

DENTAL PROCEDURE


Ipratropium/Albuterol Sulfate (Iprat-Albut 0.5-3(2.5) mg/3 ml) 3 Ml Ampul.neb, 1

 SOL INH QID PRN for SHORTNESS OF BREATH





Allergies


Coded Allergies:  


     Penicillins (Verified  Allergy, Intermediate, rash, 9/7/19)


     chlorpromazine (Verified  Allergy, Unknown, 9/7/19)


     codeine (Verified  Adverse Reaction, Unknown, vomiting, 9/7/19)











POOJA ROBLERO MD                Oct 20, 2020 12:57

## 2020-10-20 NOTE — REPVR
PROCEDURE INFORMATION: 

Exam: CT Abdomen And Pelvis With Contrast 

Exam date and time: 10/20/2020 10:17 AM 

Age: 85 years old 

Clinical indication: Abdominal pain; Localized; Left lower quadrant (llq); 

Additional info: Llq pain - await bun/cr 



TECHNIQUE: 

Imaging protocol: Computed tomography of the abdomen and pelvis with 

intravenous contrast. 

Radiation optimization: All CT scans at this facility use at least one of these 

dose optimization techniques: automated exposure control; mA and/or kV 

adjustment per patient size (includes targeted exams where dose is matched to 

clinical indication); or iterative reconstruction. 

Contrast material: ISOVUE 370; Contrast volume: 100 ml; Contrast route: 

INTRAVENOUS (IV);  



COMPARISON: 

CT ABD/PEL W/IV CONTRAST ONLY 9/7/2019 7:35 PM 



FINDINGS: 

Tubes, catheters and devices: A right ventricular pacemaker lead is present. 



Heart: Right atrial and right ventricular cardiac chamber dilatation 

redemonstrated. Mitral annular calcification is present. Marked cardiomegaly. 



Liver: Normal. No mass. 

Gallbladder and bile ducts: A single 23 mm gallstone is present dependently in 

the nondistended gallbladder. No wall thickening or pericholecystic fluid 

identified. 

Pancreas: Severe pancreatic atrophy. 

Spleen: The spleen demonstrates a few small granulomatous calcifications. 

Adrenals: Normal. No mass. 

Kidneys and ureters: 2.1 cm left renal lower pole exophytic benign simple cyst. 

Severe left renal cortical scarring. 

Stomach and bowel: There is mural thickening of the proximal sigmoid colon, 

with paracolonic diverticula and pericolonic soft tissue stranding with 

increased paracolic adipose attenuation. No evidence of perforation or abscess 

formation. 

Appendix: The vermiform appendix is normal. 



Intraperitoneal space: Unremarkable. No free air. No significant fluid 

collection. 

Vasculature: Moderate aortic atherosclerotic calcification without aneurysm. 

The iliac arteries show moderate bilateral atherosclerotic calcifications 

without evidence of aneurysm. 

Lymph nodes: No enlarged lymph nodes. 

Urinary bladder: Unremarkable as visualized. 

Reproductive: Small nonspecific uterine mural calcifications, stable. 

Bones/joints: Bilateral lower lumbar facet primary osteoarthritis. Diffuse 

osteopenia. The patient is status post median sternotomy with sternal cerclage 

wires. 

Soft tissues: Unremarkable. 



IMPRESSION: 

1. Uncomplicated sigmoid colonic diverticulitis. 

2. Left renal benign simple cyst. 

3. Severe left renal cortical scarring. 

4. Cholelithiasis. 



COMMENTS: 

Consistent with the American College of Radiology's Incidental Findings 

Committee white paper (J Am Jagdish Radiol 2018): Any incidental renal lesion less 

than 1 cm or classified as too small to characterize, or any incidental cystic 

renal lesion characterized as simple-appearing, is likely benign. No follow-up 

imaging is recommended for these lesions per consensus recommendations based on 

imaging criteria. 



Electronically signed by: Matt Lee On 10/20/2020  11:01:15 AM

## 2020-10-20 NOTE — REPVR
PROCEDURE INFORMATION: 

Exam: CT Head Without Contrast 

Exam date and time: 10/20/2020 10:17 AM 

Age: 85 years old 

Clinical indication: Altered mental status/memory loss 



TECHNIQUE: 

Imaging protocol: Computed tomography of the head without contrast. 

Radiation optimization: All CT scans at this facility use at least one of these 

dose optimization techniques: automated exposure control; mA and/or kV 

adjustment per patient size (includes targeted exams where dose is matched to 

clinical indication); or iterative reconstruction. 



COMPARISON: 

CT Head without contrast 9/24/2019 11:28 AM 



FINDINGS: 

Brain: Mild hypoattenuating foci are noted in the posterior superior periatrial 

and anterior lateral ventricular periventricular white matter bilaterally. 

Bilateral globus pallidus calcifications. 3.5 mm dural-based right lower 

parietal region densely ossified lesion likely representing a chronic calcified 

benign meningioma, stable. No intracranial hemorrhage. No acute cortical 

infarction identified. 

Cerebral ventricles: Prominence of the ventricular system and subarachnoid 

spaces is consistent with the patient's age of 85 years. 

Bones/joints: No acute abnormality identified.  No acute fracture. 

Paranasal sinuses: Visualized sinuses are unremarkable. No fluid levels. 

Mastoid air cells: Visualized mastoid air cells are well aerated. 

Orbital cavity: Bilateral prior cataract surgery. 

Vasculature: Atherosclerotic calcifications are present involving the carotid 

artery siphons bilaterally. 

Soft tissues: Unremarkable. 



IMPRESSION: 

1. Age appropriate supratentorial and infratentorial atrophy. 

2. Mild chronic white matter microvascular ischemic disease. 

3. No acute intracranial abnormality identified. 



Electronically signed by: Matt Lee On 10/20/2020  10:46:27 AM

## 2020-10-21 NOTE — IPNPDOC
Text Note


Date of Service


The patient was seen on 10/21/20.





NOTE


Subjective:


Patient is an 85-year-old  female with a PMHx of Alzheimers, A. fib 

and Mitral Valve Prosthesis (on Coumadin), s/p PM, Diastolic CHF, HTN, DLP, 

COPD, Hypothyroidism, CKD3, Unsteady gait (uses a walker at baseline) who 

presented to [facility] after expressing worsening confusion while

at home. Patient was admitted to the hospital service for evaluation of her 

diverticulitis.





Patient was seen and examined at the bedside. Patient is awake and alert. Denies

any nausea, vomiting. Denies chest pain, shortness breath or palpitations. 

Reports some abdominal discomfort. Denies any bowel movements. Denies any 

urinary discomfort.





Objective:


Vitals (See below)


General: Lying in bed, no acute distress, comfortable, AAOx3


HEENT: NC, AT


CVS: +S1S2


Lungs: Fair air entry b/l, -w/r/r


Abdomen: Soft, ND, left lower quadrant tenderness. Still appreciated


Extremities: Trace edema, - Calf tenderness





Assessment and plan:


s/p Acute metabolic encephalopathy - likely 2/2 infectious etiology 


- Mentation appears to be improved compared to yesterday patient is awake and 

alert


- No focal neurologic deficits noted


- CT head 10/20: 1. Age appropriate supratentorial and infratentorial atrophy. 

2. Mild chronic white matter microvascular ischemic disease. 3. No acute 

intracranial abnormality identified.


- See below





Acute diverticulitis


- Remains hemodynamically stable and afebrile


- Physical with improvement of left lower quadrant abdominal tenderness


- Leukocytosis persists; No lactic acidosis


- CT abdomen / pelvis 10/20: 1. Uncomplicated sigmoid colonic diverticulitis. 2.

Left renal benign simple cyst. 3. Severe left renal cortical scarring. 4. 

Cholelithiasis.


- CXR 10/20: 1. Improved pulmonary vascular congestion. 2. Mild right lateral 

basilar subsegmental atelectasis.


- c/w ceftriaxone and Flagyl (Day #2)


- c/w pain control and anti-emetics with morphine and Zofran


- Will advance diet today as tolerated





Alzheimers


- c/w Donepezil 





A. fib and Mitral Valve Prosthesis (on Coumadin)


- s/p PM


- Supra-therapeutic INR


- Will hold Coumadin today





Diastolic CHF


- LE again with trace pitting edema


- CXR with improved vascular congestion


- Will DC IV fluids 


- Will resume Diuretics 





HTN


- BP well controlled


- Currently not on medications 





DLP


- Currently not on medications 





COPD


- No evidence of exacerbation


- c/w inhaled therapy as ordered





Hypothyroidism


- c/w Levothyroxine 





CKD3


- Cr baseline of 1.0


- Will resume diuresis today 





Vitamin D deficiency


- c/w Vitamin D supplementation 





Unsteady gait 


- Uses a walker at baseline


- Will start PT evaluation today





Gastrointestinal prophylaxis


- Will c/w Protonix





DVT prophylaxis 


- Will continue full anticoagulation with Coumadin





VS,Fishbone, I+O


VS, Fishbone, I+O


Laboratory Tests


10/21/20 07:00











Vital Signs








  Date Time  Temp Pulse Resp B/P (MAP) Pulse Ox O2 Delivery O2 Flow Rate FiO2


 


10/21/20 14:00    140/71 (94)    


 


10/21/20 14:00 98.6 68 17  93 Room Air  


 


10/20/20 14:50       2.0 














I&O- Last 24 Hours up to 6 AM 


 


 10/21/20





 06:00


 


Intake Total 1100 ml


 


Output Total 30 ml


 


Balance 1070 ml

















POOJA ROBLERO MD                Oct 21, 2020 18:37

## 2020-10-21 NOTE — ECGEPIP
[facility] - ED

                                       

                                       Test Date:    2020-10-20

Pat Name:     GURWINDER LAGOS         Department:   

Patient ID:   [id number]                 Room:         -

Gender:       Female                   Technician:   hair

:          [id number]               Requested By: Maja Lundborg-Gray 

Order Number: [id number]     Reading MD:   Madhu Kiser

                                 Measurements

Intervals                              Axis          

Rate:         55                       P:            

AK:           0                        QRS:          -67

QRSD:         129                      T:            -25

QT:           447                                    

QTc:          430                                    

                           Interpretive Statements

ATRIAL FIBRILLATION WITH SLOW VENTRICULAR RESPONSE

LEFT AXIS DEVIATION

MODERATE INTRAVENTRICULAR CONDUCTION DELAY

LEFT ANTERIOR FASCICULAR BLOCK

ANTERIOR MYOCARDIAL INFARCTION, OF INDETERMINATE AGE

SIMILAR TO 20

Electronically Signed on 10- 5:32:39 EDT by Madhu Kiser

## 2020-10-22 NOTE — DS.PDOC
Discharge Summary


General


Date of Admission


Oct 20, 2020 at 12:28


Date of Discharge


10/22/2020


Primary Care Physician:  Екатерина Zuluaga


Attending Physician:  JOE POTTS MD





Discharge Summary


PROCEDURES PERFORMED DURING STAY: None.





ADMITTING/DISCHARGE DIAGNOSES:


1. Acute metabolic encephalopathy, likely secondary to infectious etiology.


2. Acute diverticulitis.


3. Alzheimer's dementia.


4. Atrial fibrillation with mitral valve prosthesis, on Coumadin.


5. Diastolic congestive heart failure


6. Hypertension


7. Dyslipidemia


8. COPD


9. Hypothyroidism


10. CKG stage III


11. Vitamin D deficiency


12. Unsteady gait





COMPLICATIONS/CHIEF COMPLAINT: Diverticulitis Large Intestine.





HISTORY OF PRESENT ILLNESS: Patient is an 85-year-old female who presented to 

the hospital on 10/20/2020 with's increased confusion over the last 2 weeks. 

Patient has also had progressive weakness 2 days prior to admission. Patient 

also had a loss of appetite the day prior to coming to admission. Patient had 

been experiencing a cough without any significant sputum production. Patient had

also been complaining of increased abdominal pain over the last 3-4 days with 

constipation while at home. Patient was brought into the emergency department 

with patient was found to have diverticulitis and was started on IV antibiotics.

Patient was started on IV ceftriaxone and IV metronidazole.





HOSPITAL COURSE: During the patient's hospital course, she was at first very 

sleepy and was not waking up much. Patient's diet was able to be advanced on 

10/21/2020. Patient became more awake on 10/21/2020 and was experiencing less 

abdominal pain. Patient worked with physical therapy and was back at her 

baseline. Patient was doing much better on 10/22/2020. Patient was eating and 

was not reporting any abdominal pain. Patient's INR did become supratherapeutic.

We spoke with the patient's daughter about this and she has a device that she 

can check her INR from home. We advised her to hold her warfarin and to speak 

with her primary care provider, Dr. Telles, who manages her INR for further 

instructions. We advised her to check her INR tomorrow and report this to her 

PCP. Metronidazole is the most likely culprit for her increased INR. Patient 

will be discharged home on 3 more days of ciprofloxacin and metronidazole. 

Patient was deemed safe and ready for discharge on 10/20/2020 and was discharged

from the hospital. 





DISCHARGE MEDICATIONS: Please see below.


 


ALLERGIES: Please see below.





PHYSICAL EXAMINATION ON DISCHARGE:


VITAL SIGNS: Please see below.


General: Alert female who is sitting in the chair eating a full liquid diet when

I walked into the room. Patient did not appear to be in any acute distress.


HEENT: Normocephalic, atraumatic, moist mucous membranes.


Cardiac: Regular rate and rhythm, no murmurs, normal S1, normal S2


Pulm: Clear to auscultation bilaterally. No wheezes, rhonchi, rales


Abd: Nondistended, nontender to palpation, normal bowel sounds


Ext: No edema bilateral lower extremities





LABORATORY DATA: Please see below.





IMAGING: A CT of the head performed without IV contrast on 10/20/2020 was 

reported to show age-appropriate supratentorial and infratentorial atrophy, mild

chronic white matter microvascular ischemic disease, no acute intracranial 

abnormality identified.


A chest x-ray performed on 10/20/2020 was reported to show improved pulmonary 

vascular congestion, mild right lateral basilar subsegmental atelectasis


A CT of the abdomen and pelvis performed with IV contrast only on 10/20/2020 was

reported to show uncomplicated sigmoid colonic diverticulitis, left renal benign

simple cyst, severe left renal cortical scarring, cholelithiasis





PROGNOSIS: Fair





ACTIVITY: As tolerated.





DIET: Advance as tolerated





DISCHARGE PLAN: Discharge home with family





DISCHARGE INSTRUCTIONS:


1. Follow up with primary care provider in 3-5 days.


2. Hold warfarin today and recheck INR with home INR machine tomorrow and await 

instructions from primary care provider.


3. Advance diet as tolerated.


4. Continue ciprofloxacin and metronidazole for 3 days





DISCHARGE CONDITION: Stable.





TIME SPENT ON DISCHARGE: 


30 minutes.





Vital Signs/I&Os





Vital Signs








  Date Time  Temp Pulse Resp B/P (MAP) Pulse Ox O2 Delivery O2 Flow Rate FiO2


 


10/21/20 22:00 99.0 62 18 137/49 (78) 94 Room Air  


 


10/20/20 14:50       2.0 














I&O- Last 24 Hours up to 6 AM 


 


 10/22/20





 06:00


 


Intake Total 2284 ml


 


Output Total 0 ml


 


Balance 2284 ml











Laboratory Data


Labs 24H


Laboratory Tests 2


10/22/20 06:52: Lab Scanned Report Miscellaneous Lab


10/22/20 07:11: 


Immature Granulocyte % (Auto) 0.4, Neutrophils (%) (Auto) 68.1H, Lymphocytes (%)

(Auto) 9.6L, Monocytes (%) (Auto) 19.6H, Eosinophils (%) (Auto) 1.5, Basophils 

(%) (Auto) 0.8, Neutrophils # (Auto) 7.0, Lymphocytes # (Auto) 1.0L, Monocytes #

(Auto) 2.0H, Eosinophils # (Auto) 0.2, Basophils # (Auto) 0.1, Nucleated Red 

Blood Cells % (auto) 0.0, Anion Gap 7L, Glomerular Filtration Rate 55.1, Calcium

Level 8.9, Magnesium Level 2.0


10/22/20 08:23: 


Prothrombin Time 48.0H, Prothromb Time International Ratio 5.06*H


CBC/BMP


Laboratory Tests


10/22/20 07:11











Discharge Medications


Scheduled


Calcium Carbonate/Vitamin D3 (Calcium 500-Vit D3 200 Caplet) 1 Each Tablet, 1 

TAB PO TID, (Reported)


Cholecalciferol (Vitamin D3) (Vitamin D3) 125 Mcg Capsule, 125 MCG PO DAILY, 

(Reported)


   TAKES AT 1300 


Ciprofloxacin HCl (Ciprofloxacin HCl) 500 Mg Tablet, 500 MG PO BID


Docusate Sodium (Colace) 100 Mg Capsule, 100 MG PO BID, (Reported)


Donepezil HCl (Aricept) 5 Mg Tab, 5 MG PO QHS, (Reported)


Levothyroxine Sodium (Synthroid) 25 Mcg Tab, 25 MCG PO DAILY, (Reported)


Metronidazole (Metronidazole) 500 Mg Tablet, 500 MG PO TID


Multivitamin (Multivitamins) 1 Each Capsule, 1 CAP PO QHS, (Reported)


Potassium Chloride (Potassium Chloride) 10 Meq Tab.er.prt, 10 MEQ PO BID, 

(Reported)


   TAKES 0800 AND 1800 


Salmeterol/Fluticasone (Advair 100-50 Diskus) 28 Puff/Inhaler Aerp, 1 PUFF INH 

BID, (Reported)


Simvastatin (Simvastatin) 40 Mg Tab, 40 MG PO QHS, (Reported)


Spironolactone (Spironolactone) 25 Mg Tab, 25 MG PO DAILY, (Reported)


Torsemide (Torsemide) 100 Mg Tab, 50 MG PO DAILY, (Reported)


Umeclidinium Bromide (Incruse Ellipta) 62.5 Mcg Blst.w.dev, 1 PUFF PO QHS, 

(Reported)





Scheduled PRN


Acetaminophen (Tylenol) 325 Mg Tablet, 650 MG PO Q6H PRN for PAIN / FEVER, 

(Reported)


Albuterol Sulf (Albuterol Sulfate) 2.5 Mg/3 Ml Vial.neb, 2.5 MG INH QID PRN for 

SOB/WHEEZING, (Reported)


Clarithromycin (Clarithromycin) 500 Mg Tablet, 500 MG PO ONCE PRN for PRIOR TO 

DENTAL PROCEDURE, (Reported)


Ipratropium/Albuterol Sulfate (Iprat-Albut 0.5-3(2.5) mg/3 ml) 3 Ml Ampul.neb, 1

SOL INH QID PRN for SHORTNESS OF BREATH, (Reported)





Allergies


Coded Allergies:  


     Penicillins (Verified  Allergy, Intermediate, rash, 9/7/19)


     chlorpromazine (Verified  Allergy, Unknown, 9/7/19)


     codeine (Verified  Adverse Reaction, Unknown, vomiting, 9/7/19)





GME ATTESTATION


GME ATTESTATION


My faculty preceptor for this patient encounter was physically present during 

the encounter and was fully available. All aspects of the patient interview, 

examination, medical decision making process, and medical care plan development 

were reviewed and approved by the faculty preceptor. The faculty preceptor is 

aware and concurs with the plan as stated in the body of this note and will 

attest to such by his/her cosignature.





ATTENDING NOTE


I, Joe Potts, have independently examined this patient and performed my own 

physical exam, as well as reviewed the documentation and edited where necessary.

I have discussed in detail with the resident / student the findings and plan of 

treatment as documented by the resident / student and edited their note. I agree

with their findings and treatment plan and have edited their documentation. I 

will continue to follow the patient during this hospital stay.





Time spent on discharge 35 minutes











IMMANUEL DANGELO DO               Oct 22, 2020 11:18


JOE POTTS MD                Oct 22, 2020 15:47

## 2020-11-25 NOTE — HPEPDOC
[facility] Medical History & Physical


Date of Admission


2020


Date of Service:  2020


Primary Care Physician:  Jr Telles Collins


Attending Physician:  BALJEET KAUR MD





History and Physical


CHIEF COMPLAINT: Syncope





HISTORY OF PRESENT ILLNESS: The patient has dementia, and history is given by 

her daughter. The patient is an 84yo female with a hx of Alzheimer dementia, 

HTN, atrial fibrillation, and diastolic heart failure. She was in her normal 

state of health until this morning, when her  was helping her out of bed 

and she fell. The patient's  called for the facility staff. The patient 

reportedly regained consciousness after a few seconds and stated "I passed out."

She was brought to the hospital by EMS and is accompanied by her daughter. The 

patient denies dizziness, blurry vision, or tremor. The patient is in no pain, 

and has had no recent changes to eating habits or energy levels, though the 

daughter adds that the patient's normal energy and appetite are low.





PAST MEDICAL HISTORY:


1. Alzheimer's Dementia.


2. HTN.


3. A-Fib.


4. Diastolic Heart Failure.


5. COPD


6. Mechanical Mitral Valve 


7. CKD Stage 3





PAST SURGICAL HISTORY:


1. Pacemaker.


2. Mitral Mechanical Valve Replacement.





SOCIAL HISTORY:


Marital status: .


Resides in: [facility]. Has resided there since 

2020


Employment: Retired . 


Tobacco use: Former smoker. Daughter reports that she quit in the 70s, and 

smoked less than one pack a day


ETOH: used to drink socially. 





FAMILY HISTORY:


Mother:  of a stroke in her 60s.  





ALLERGIES: Please see below.





REVIEW OF SYSTEMS:


CONSTITUTIONAL: Denies recent weight changes, energy changes, fever or chills


HEENT: Denies eyesight changes, blurry vision, hearing changes, or sinus 

problems. 


CARDIOVASCULAR: Denies chest pain, irregular heart beats, swelling in the ex

tremities. 


RESPIRATORY: Denies SOB, cough, wheezing. 


GASTROINTESTINAL: Denies nausea, vomiting, constipation/diarrhea, pain or 

difficulties swallowing. 


GENITOURINARY: Denies pyuria.


SKIN: Denies new rashes.


MUSCULOSKELETAL: Denies muscle or joint pains.


NEUROLOGICAL: Positive for loss of consciousness as per HPI. Denies HA, blurry 

vision, weakness.





HOME MEDICATIONS: Please see below. 





PHYSICAL EXAMINATION:


VITAL SIGNS: See below


GENERAL APPEARANCE: Patient NAD. Patient is an elderly woman who appears her 

stated age. She is well groomed. 


HEENT: Eyes are PERRL, but do not accommodate. Mucus membranes are moist and 

pink. Normocephalic, atraumatic. 


CARDIOVASCULAR: Bradycardic with a normal rhythm. S1 and S2 sounds are noted. No

S3 or S4 sounds are appreciated. There is a distinct click over the mitral 

valve. Radial pulses are equal. Capillary refill <3s. 


LUNGS: Wheezing is heard at the lung bases b/l. No rales or rhonchi are 

appreciated. Chest rise is symmetrical on inhalation. 


ABDOMEN: Abdomen is soft, nondistended, nontender, bowel sounds present


MUSCULOSKELETAL: Moves all extremities well.


EXTREMITIES: There is no edema in the legs. No lesions are seen on the feet. 


NEUROLOGICAL: Eyes have trouble tracking finger movements, and do not look down.

Otherwise, cranial nerve 2-12 testing is normal. Muscle strength 5/5 in both 

lower and upper extremities. 


PSYCHIATRIC: Patient is awake and alert. Patient has a flat affect.





LABORATORY DATA: See below.





IMAGING: None





MICROBIOLOGY: Please see below. 





ASSESSMENT: Mrs. Basilio is an 84yo female with a PMHx of Alzheimer's dementia,

A-fib, HTN and CKD Stage 3, who presented after an episode of syncope found to 

be bradycardic with ventricular pacemaker functioning at a rate of 50. 





PLAN:


1. Bradycardia with ventricular pacemaker.


- The daughter reports that previous interrogation last Feb did not show any use

the previous year.


- Discussed with Dr. Manjarrez who will interrogate the pacemaker and may increase

the basal rate.


- Obtain 2D Echocardiogram. Continuous Telemetry.





2. Syncope


-Likely 2/2 bradycardia as discussed above


-Orthostatics in the ED were negative


-Patient has no signs of infection 


-No electrolyte abnormalities on lab work





3. Mechanical Mitral Valve


-INR is 2.36, below the therapeutic range for mechanical valve (2.5-3.5)


-Home warfarin is 3mg. Will give 4 mg dose of Warfarin tonight, and will 

reevaluate INR in the morning to determine further treatment. 





4. Diastolic CHF


- Continue spironolactone and torsemide.





5. A-Fib


- Continue Warfarin as noted above





5. CKD Stage 3


- Cr 1.1, baseline around 1.0


- monitor BMP daily





6. HLD


-Continue home Simvastatin 





7. Alzheimer's Dementia


-Continue home Donepezil 





DVT prophylaxis: on warfarin as noted above





Disposition: admitted to med/surg with TM inpatient, expect greater than 2 

midnight's stay





Vital Signs





Vital Signs








  Date Time  Temp Pulse Resp B/P (MAP) Pulse Ox O2 Delivery O2 Flow Rate FiO2


 


20 11:05 97.9 49 18  96 Room Air  


 


20 11:00    142/67 (92)    











Laboratory Data


Labs 24H


Laboratory Tests 2


20 07:15: 


Immature Granulocyte % (Auto) 0.4, Neutrophils (%) (Auto) 65.5, Lymphocytes (%) 

(Auto) 12.8L, Monocytes (%) (Auto) 18.0H, Eosinophils (%) (Auto) 2.2, Basophils 

(%) (Auto) 1.1H, Neutrophils # (Auto) 5.5, Lymphocytes # (Auto) 1.1L, Monocytes 

# (Auto) 1.5H, Eosinophils # (Auto) 0.2, Basophils # (Auto) 0.1, Nucleated Red 

Blood Cells % (auto) 0.0, Prothrombin Time 26.5H, Prothromb Time International 

Ratio 2.38, Thyroid Stimulating Hormone (TSH) 5.120H


20 07:19: Bedside Glucose (Misc Panel) 83


20 07:35: 


POC Glucose (Misc Panel) 89, POC Sodium (Misc Panel) 138, POC Potassium (Misc 

Panel) 3.7, POC Chloride (Misc Panel) 98, POC Total CO2 (Misc Panel) 32.0H, POC 

Blood Urea Nitrogen (Misc Panel 26, POC Ionized Calcium (Misc Panel) 4.6, POC 

Creatinine (Misc Panel) 1.1, POC Hematocrit (Misc Panel) 40.0


20 07:37: POC Troponin I (Misc) 0.01


20 09:31: Coronavirus (COVID-19)(PCR) NEGATIVE


CBC/BMP


Laboratory Tests


20 07:15











Home Medications


Scheduled


Calcium Carbonate/Vitamin D3 (Calcium 500-Vit D3 200 Caplet) 1 Each Tablet, 1 T

AB PO TID


Cholecalciferol (Vitamin D3) (Vitamin D3) 125 Mcg Capsule, 125 MCG PO DAILY


   TAKES AT 1300 


Docusate Sodium (Colace) 100 Mg Capsule, 100 MG PO BID


Donepezil HCl (Aricept) 5 Mg Tab, 5 MG PO QHS


Levothyroxine Sodium (Levothyroxine Sodium) 25 Mcg Tablet, 25 MCG PO DAILY


Multivitamin (Multivitamins) 1 Each Capsule, 1 CAP PO QHS


Potassium Chloride (Potassium Chloride) 10 Meq Tab.er.prt, 10 MEQ PO BID


   TAKES 0800 AND 1800 


Salmeterol/Fluticasone (Advair 100-50 Diskus) 28 Puff/Inhaler Aerp, 1 PUFF INH 

BID


Simvastatin (Simvastatin) 40 Mg Tab, 40 MG PO QHS


Spironolactone (Spironolactone) 25 Mg Tab, 25 MG PO DAILY


Torsemide (Torsemide) 100 Mg Tab, 100 MG PO DAILY


Umeclidinium Bromide (Incruse Ellipta) 62.5 Mcg Blst.w.dev, 1 PUFF PO QHS


Warfarin Sodium (Warfarin Sodium) 3 Mg Tablet, 3 MG PO QHS





Scheduled PRN


Acetaminophen (Tylenol) 325 Mg Tablet, 650 MG PO Q6H PRN for PAIN / FEVER


Albuterol Sulf (Albuterol Sulfate) 2.5 Mg/3 Ml Vial.neb, 2.5 MG INH QID PRN for 

SOB/WHEEZING


Ipratropium/Albuterol Sulfate (Iprat-Albut 0.5-3(2.5) mg/3 ml) 3 Ml Ampul.neb, 1

SOL INH QID PRN for SHORTNESS OF BREATH





Allergies


Coded Allergies:  


     Penicillins (Verified  Allergy, Intermediate, rash, 20)


     chlorpromazine (Verified  Allergy, Unknown, 20)


     codeine (Verified  Adverse Reaction, Unknown, vomiting, 20)





A-FIB/CHADSVASC


A-FIB History


Current/History of A-Fib/PAF?:  Yes


Current PO Anticoag Therapy:  Yes





GME ATTESTATION


GME ATTESTATION


My faculty preceptor for this patient encounter was physically present during 

the encounter and was fully available. All aspects of the patient interview, 

examination, medical decision making process, and medical care plan development 

were reviewed and approved by the faculty preceptor. The faculty preceptor is 

aware and concurs with the plan as stated in the body of this note and will 

attest to such by his/her cosignature.





ATTENDING NOTE


Patient was seen and examined by me personally with the students and the residen

ts. Agree with the above assessment and plan.











GET TRUJILLO                2020 11:36


MAURIZIO RUIZ D.O.        2020 16:17


BALJEET KAUR MD             2020 11:37

## 2020-11-26 NOTE — IPNPDOC
Text Note


Date of Service


The patient was seen on 11/26/20.





NOTE


SUBJECTIVE:





Patient was seen and examined today at bedside. She appears somewhat confused as

she is trying to eat breakfast, but holding up her breakfast order form and 

unsure of what to do with it. She states she is frustrated. She denies any new 

complaints today. No chest pain or palpitations. No shortness of breath. No 

recent episodes of syncope. However, she is somewhat poor historian due to her 

advanced dementia.





OBJECTIVE:


VITAL SIGNS: See below


GENERAL: Alert, comfortable, in no acute distress


HEENT: Normocephalic, atraumatic, PERRLA, EOMI, moist mucous membranes


NECK: Supple, trachea midline, no lymphadenopathy, no JVD


CARDIOVASCULAR: Bradycardic rate, normal rhythm, normal S1 and S2. Distinct 

click auscultated.


RESPIRATORY: Clear to auscultation bilaterally with equal air entry bilaterally.

No wheezing, rhonchi, or rales.


ABDOMEN: Soft, nontender, nondistended, bowel sounds present, no masses or 

hepatosplenomegaly appreciated


EXTREMITIES: No cyanosis or edema. Pulses 2+/4 in bilateral upper and lower 

extremities


SKIN: Pink, warm, dry


NEUROLOGIC: Alert and oriented x1 to person. No focal deficits appreciated


PSYCHIATRIC: Affect somewhat flat. Appears frustrated due to her confusion.





ASSESSMENT/PLAN:


85 year old female with a history of Alzheimer's dementia, atrial fibrillation, 

HTN and CKD Stage 3, who presented after an episode of syncope found to be 

bradycardic with ventricular pacemaker functioning at a rate of 50.





# Bradycardia with ventricular pacemaker.


- The daughter reports that previous interrogation last Feb did not show any use

the previous year.


- Discussed with Dr. Manjarrez who came in yesterday to adjust the pacemaker 

settings. Her basal rate was increased to 60.


- Ordered echocardiogram, results pending. Continuous telemetry.





# Syncope


-Likely 2/2 bradycardia as discussed above


-Orthostatics in the ED were negative


-Patient has no signs of infection 


-No electrolyte abnormalities on lab work


-Echocardiogram results pending.





# Mechanical Mitral Valve


-INR is 2.35, below the therapeutic range for mechanical valve (2.5-3.5)


-Home warfarin is 3mg. Will give 5 mg dose of Warfarin tonight as her INR 

remains subtherapeutic. 





# Diastolic CHF


- Continue spironolactone and torsemide.





# A-Fib


- Continue Warfarin as noted above





# CKD Stage 3


- Cr 1.0, baseline around 1.0


- monitor BMP daily





# HLD


-Continue home Simvastatin 





# Alzheimer's Dementia


-Continue home Donepezil 





DVT prophylaxis: on warfarin as noted above





Disposition: likely d/c tomorrow to return to her assisted living facility with 

her 





VS,Fishbone, I+O


VS, Fishbone, I+O


Laboratory Tests


11/26/20 07:31











Vital Signs








  Date Time  Temp Pulse Resp B/P (MAP) Pulse Ox O2 Delivery O2 Flow Rate FiO2


 


11/26/20 06:00 97.1 60 20 127/52 (77) 95 Room Air  














I&O- Last 24 Hours up to 6 AM 


 


 11/26/20





 06:00


 


Intake Total 390 ml


 


Output Total 500 ml


 


Balance -110 ml











GME ATTESTATION


GME ATTESTATION


My faculty preceptor for this patient encounter was physically present during 

the encounter and was fully available. All aspects of the patient interview, 

examination, medical decision making process, and medical care plan development 

were reviewed and approved by the faculty preceptor. The faculty preceptor is 

aware and concurs with the plan as stated in the body of this note and will 

attest to such by his/her cosignature.





ATTENDING NOTE


Patient was seen and examined by me personally with the students and the 

residents. Agree with the above assessment and plan.











MAURIZIO RUIZ D.O.        Nov 26, 2020 10:51


BALJEET KAUR MD             Nov 27, 2020 11:37

## 2020-11-26 NOTE — ECGEPIP
[facility] - ED

                                       

                                       Test Date:    2020

Pat Name:     GURWINDER LAGOS         Department:   

Patient ID:   [id number]                 Room:         -

Gender:       Female                   Technician:   KAYLENE

:          [id number]               Requested By: KAY MARTIN 

Order Number: [id number]     Reading MD:   Maryan Gregory

                                 Measurements

Intervals                              Axis          

Rate:         48                       P:            

AL:           0                        QRS:          -74

QRSD:         205                      T:            84

QT:           550                                    

QTc:          493                                    

                           Interpretive Statements

ELECTRONIC VENTRICULAR PACEMAKER

ABNORMAL RHYTHM ECG

Electronically Signed on 2020 7:22:37 EST by Maryan Gregory

## 2020-11-27 NOTE — DS.PDOC
Discharge Summary


General


Date of Admission


Nov 25, 2020 at 10:38


Date of Discharge


11/27/2020


Attending Physician:  BALJEET KAUR MD





Discharge Summary


PROCEDURES PERFORMED DURING STAY: None.





ADMITTING DIAGNOSES: 


1. Bradycardia with ventricular pacemaker.


2. Syncope


3. Mechanical Mitral Valve


4. Diastolic CHF


5. A-Fib


5. CKD Stage 3


6. HLD


7. Alzheimer's Dementia





DISCHARGE DIAGNOSES:


1. Bradycardia, resolved, with ventricular pacemaker.


2. Syncope, single episode


3. Mechanical Mitral Valve


4. Diastolic CHF


5. A-Fib


5. CKD Stage 3


6. HLD


7. Alzheimer's Dementia





COMPLICATIONS/CHIEF COMPLAINT: Dementia/Syncope.





HISTORY OF PRESENT ILLNESS: Xena Basilio is an 85-year-old female who 

presented after a single syncopal episode at her home in a assisted living 

facility. She lives in assisted living with her  who was helping to get 

her out of bed when the patient suddenly went weak and nonresponsive for a few 

seconds. The  prevented her from falling to the ground and lowered her 

down carefully. The patient awoke after just a few seconds and stated "I just 

fainted". The assisted living facility called EMS and the patient was brought to

the emergency department for further evaluation. The patient's daughter 

accompanied her and provided most of the history. The patient herself was able 

to answer review of system questions and denied any current symptoms. She did 

recall the episode earlier that morning and denies any prior episodes similar to

this. Emergency department evaluation did not find any acute abnormalities. 

However, EKG showed a ventricularly paced rhythm with atrial fibrillation and 

bradycardia at a rate of 48. The hospitalist team was called for admission for 

possible symptomatic bradycardia or pacemaker dysfunction..





HOSPITAL COURSE: The patient was admitted to the hospital and monitored on 

continuous telemetry. The daughter provided further history, noting that the 

patient had been seen by her cardiologist, Dr. Tiny mackenzie in February 2020 and 

had not had any use of the pacemaker at that time for the previous year. In 

light of this, it appears she is newly pacemaker dependent. Dr. Tiny mackenzie was 

called and he was able to see the patient and interrogate the pacemaker. He did 

not find any acute events during the time of the syncope. He also increase the 

basal rate of the pacemaker to 60 BPM, instead of 50 BPM which it was previously

set at. An echocardiogram was performed with results detailed below. The patient

did not have any syncopal episodes or pre-syncopal symptoms during her 

admission.





DISCHARGE MEDICATIONS: Please see below.


 


ALLERGIES: Please see below.





PHYSICAL EXAMINATION ON DISCHARGE:


VITAL SIGNS: Please see below.


GENERAL: Alert, comfortable, in no acute distress


HEENT: Normocephalic, atraumatic, PERRLA, EOMI, moist mucous membranes


NECK: Supple, trachea midline, no lymphadenopathy, no JVD


CARDIOVASCULAR: Bradycardic rate, normal rhythm, normal S1 and S2. Distinct 

click auscultated.


RESPIRATORY: Clear to auscultation bilaterally with equal air entry bilaterally.

No wheezing, rhonchi, or rales.


ABDOMEN: Soft, nontender, nondistended, bowel sounds present, no masses or 

hepatosplenomegaly appreciated


EXTREMITIES: No cyanosis or edema. Pulses 2+/4 in bilateral upper and lower 

extremities


SKIN: Pink, warm, dry


NEUROLOGIC: Alert and oriented x1 to person. No focal deficits appreciated


PSYCHIATRIC: Affect somewhat flat. Appears frustrated due to her confusion.





LABORATORY DATA: Please see below.





IMAGING: 


- Echocardiogram: 


   1.  Study is of fair technical quality, underlying atrial fibrillation with 

slow ventricular response and intermittent ventricular pacing.


   2.  Normal left ventricular (LV) size with septal wall motion abnormality and

overall mildly reduced left ventricular systolic function, estimated left 

ventricular ejection fraction (LVEF) 50%. 


   3.  Aortic sclerosis with no stenosis and mild insufficiency.


   4.  Normally functional mitral prosthetic valve. 


   5.  High central venous pressure and likely moderate pulmonary hypertension.


   6.  Moderate tricuspid insufficiency.


   7.  Echo artifact in right-sided heart chamber most likely consistent with 

right ventricle pacing lead. 





PROGNOSIS: Fair





ACTIVITY: As tolerated.





DIET: 2 gram sodium restriction





DISCHARGE PLAN: Home to assisted living facility





DISPOSITION: 01 Home, Self-Care.





DISCHARGE INSTRUCTIONS:


1. Follow-up with your PCP in 7-10 days


2. Follow up with cardiology Dr Manjarrez next week as scheduled


3. You had an echocardiogram during this admission and the results will be 

reviewed with you by your PCP and/or cardiologist.


4. If your symptoms return or your condition worsens, please call your PCP or 

return to the ED for further evaluation.





ITEMS TO FOLLOWUP ON ON OUTPATIENT:


1. Echocardiogram results





DISCHARGE CONDITION: Stable.





TIME SPENT ON DISCHARGE: Greater than 35 minutes.





Vital Signs/I&Os





Vital Signs








  Date Time  Temp Pulse Resp B/P (MAP) Pulse Ox O2 Delivery O2 Flow Rate FiO2


 


11/27/20 06:39 98.2 62 18 126/54 (78) 94 Room Air  














I&O- Last 24 Hours up to 6 AM 


 


 11/27/20





 06:00


 


Intake Total 220 ml


 


Output Total 100 ml


 


Balance 120 ml











Laboratory Data


Labs 24H


Laboratory Tests 2


11/27/20 05:39: 


Prothrombin Time 30.1H, Prothromb Time International Ratio 2.79





Discharge Medications


Scheduled


Calcium Carbonate/Vitamin D3 (Calcium 500-Vit D3 200 Caplet) 1 Each Tablet, 1 

TAB PO TID, (Reported)


Cholecalciferol (Vitamin D3) (Vitamin D3) 125 Mcg Capsule, 125 MCG PO DAILY, 

(Reported)


   TAKES AT 1300 


Docusate Sodium (Colace) 100 Mg Capsule, 100 MG PO BID, (Reported)


Donepezil HCl (Aricept) 5 Mg Tab, 5 MG PO QHS, (Reported)


Levothyroxine Sodium (Levothyroxine Sodium) 25 Mcg Tablet, 25 MCG PO DAILY, 

(Reported)


Multivitamin (Multivitamins) 1 Each Capsule, 1 CAP PO QHS, (Reported)


Potassium Chloride (Potassium Chloride) 10 Meq Tab.er.prt, 10 MEQ PO BID, 

(Reported)


   TAKES 0800 AND 1800 


Salmeterol/Fluticasone (Advair 100-50 Diskus) 28 Puff/Inhaler Aerp, 1 PUFF INH 

BID, (Reported)


Simvastatin (Simvastatin) 40 Mg Tab, 40 MG PO QHS, (Reported)


Spironolactone (Spironolactone) 25 Mg Tab, 25 MG PO DAILY, (Reported)


Torsemide (Torsemide) 100 Mg Tab, 100 MG PO DAILY, (Reported)


Umeclidinium Bromide (Incruse Ellipta) 62.5 Mcg Blst.w.dev, 1 PUFF PO QHS, (

Reported)


Warfarin Sodium (Warfarin Sodium) 3 Mg Tablet, 3 MG PO QHS, (Reported)





Scheduled PRN


Acetaminophen (Tylenol) 325 Mg Tablet, 650 MG PO Q6H PRN for PAIN / FEVER, 

(Reported)


Albuterol Sulf (Albuterol Sulfate) 2.5 Mg/3 Ml Vial.neb, 2.5 MG INH QID PRN for 

SOB/WHEEZING, (Reported)


Ipratropium/Albuterol Sulfate (Iprat-Albut 0.5-3(2.5) mg/3 ml) 3 Ml Ampul.neb, 1

SOL INH QID PRN for SHORTNESS OF BREATH, (Reported)





Allergies


Coded Allergies:  


     Penicillins (Verified  Allergy, Intermediate, rash, 11/25/20)


     chlorpromazine (Verified  Allergy, Unknown, 11/25/20)


     codeine (Verified  Adverse Reaction, Unknown, vomiting, 11/25/20)





GME ATTESTATION


GME ATTESTATION


My faculty preceptor for this patient encounter was physically present during 

the encounter and was fully available. All aspects of the patient interview, 

examination, medical decision making process, and medical care plan development 

were reviewed and approved by the faculty preceptor. The faculty preceptor is 

aware and concurs with the plan as stated in the body of this note and will 

attest to such by his/her cosignature.











MAURIZIO RUIZ D.O.        Nov 27, 2020 14:43

## 2020-11-27 NOTE — ECHO
DATE OF PROCEDURE: 11/25/2020 



Age: 85 

Gender: Female 

Height: 160 cm 

Weight: 71 kg  



REFERRING PHYSICIAN: Maksim Raymond MD and Lien Costello DO.



INDICATION: Syncope.  



MEASUREMENTS:

   IVS 1.0 cm

   LV 5.4 cm

   LVPW 0.9 cm

   LA 6.1 cm

   Aorta 3.0 cm

   IVC 2.4 cm

   Left atrial volume index 98 



FINDINGS: 

This study is of acceptable technical quality. The patient is in atrial 
fibrillation with bradycardic rate and intermittent ventricular pacing. 



Left ventricle is normal size. There is septal wall motion abnormality 
consistent with right ventricular paced rhythm. Overall, ejection fraction 
appears to be mildly reduced. I estimated LVEF around 50%. Right ventricle 
appeared mildly dilated. There is severe biapical enlargement, left atrium is 
larger than the right. There is an echo artifact in right-sided heart chambers 
consistent with pacemaker lead. The aortic valve is tricuspid. It is minimally 
sclerotic, but mobility of leaflets is preserved. There is a mechanical 
prosthesis in the mitral position. The visualization was rather limited, but it 
appears to have normal mobility. No obvious dysfunction of the valves by 2D 
imaging is apparent. Tricuspid valve appears normal. Pulmonic valve also appears
normal. No pericardial effusion is noted. Inferior vena cava is dilated and 
there is limited collapse with inspiration indicative of high central venous 
pressure. Aortic root is normal. Aortic arch and abdominal aorta were not well 
visualized. 



Doppler interrogation of the aortic valve reveals no stenosis and mild 
insufficiency. The mean gradient across the mitral valve is 5 mmHg, peak 
gradient 12 mmHg, and only mild insufficiency seen by color Doppler imaging. 
This is consistent with normal function of the prosthetic valve. There is at 
least moderate tricuspid insufficiency. Calculated pulmonary artery pressure is 
at minimum in the 50s, corresponding to moderate pulmonary hypertension. 
Pulmonic valve exhibits trace insufficiency. 



Evaluation of diastolic function is inconclusive due to underlying atrial 
fibrillation and the presence of mitral prosthesis. 



CONCLUSIONS: 

1.  Study is of fair technical quality, underlying atrial fibrillation with slow
ventricular response and intermittent ventricular pacing.

2.  Normal left ventricular (LV) size with septal wall motion abnormality and 
overall mildly reduced left ventricular systolic function, estimated left 
ventricular ejection fraction (LVEF) 50%. 

3.  Aortic sclerosis with no stenosis and mild insufficiency.

4.  Normally functional mitral prosthetic valve. 

5.  High central venous pressure and likely moderate pulmonary hypertension.

6.  Moderate tricuspid insufficiency.

7.  Echo artifact in right-sided heart chamber most likely consistent with right
ventricle pacing lead. 



COMMENTS: Subacute bacterial endocarditis (SBE) prophylaxis is recommended. The 
severity of pulmonary hypertension does not appear to be sufficient to explain 
syncopal event. 

MTDD

## 2021-01-22 NOTE — CCD
Continuity of Care Document (CCD)

                             Created on: 2020



Xena Basilio

External Reference #: [id number]

: 1935

Sex: Female



Demographics





                          Address                   [address]

[place]  [address]

 

                          Home Phone                [phone]

 

                          Preferred Language        Unknown

 

                          Marital Status            Unknown

 

                          Nondenominational Affiliation     Unknown

 

                          Race                      White

 

                          Ethnic Group              Not  or 





Author





                          Author                    Xena SHEEHAN PA

 

                          Organization              Unknown

 

                          Address                   [address], [address]

[place]  [address]



 

                          Phone                     [phone]







Care Team Providers





                    Care Team Member Name Role                Phone

 

                    Elfego MAR MD, Haris CAMPBELL AUTM                [phone]

1







Problems





                    Active Problems     Provider            Date

 

                    Complete atrioventricular block JACQUE Reynolds 

Onset: 2012

 

                    Cardiac pacemaker in situ JACQUE Reynolds Onset:

 2012

 

                    Chronic diastolic heart failure Rudi Manjarrez MD Onset: 

02/15/2018

 

                    Chronic atrial fibrillation Rudi Manjarrez MD Onset: 

 

                    Heart valve replacement Rudi Manjarrez MD Onset: 02/15/20

18

 

                    Essential hypertension Rudi Manjarrez MD Onset: 02/15/201

8

 

                    Left bundle branch block Rudi Manjarrez MD Onset: 02/15/2

018

 

                    High degree second degree atrioventricular block Rudi kraft MD Onset: 

02/15/2018

 

                    Rheumatic mitral regurgitation KEATON Licea Onset: 0

2018

 

                    Dietary management surveillance KEATON Licea Onset: 

2018

 

                    Hypertensive heart disease with heart failure KEATON Moore Onset: 

2018







Social History





                Type            Date            Description     Comments

 

                Birth Sex                       Unknown          

 

                ETOH Use                        Does not consume alcohol  

 

                Tobacco Use     Start: Unknown End: Unknown Patient is a former 

smoker up to 1 ppd 

x20 yrs, quit  

 

                Smoking Status  Reviewed: 20 Patient is a former smoker up

 to 1 ppd x20 

yrs, quit  

 

                Exercise Type/Frequency                 Walks sporadically  

 

                Exercise Limitations                 Fatigue          

 

                Exercise Limitations                 Imbalance        

 

                Exercise Limitations                 Weakness        some 

 

                Exercise Limitations                 Shortness Of Breath  







Allergies, Adverse Reactions, Alerts





             Active Allergies Reaction     Severity     Comments     Date

 

             Penicillins                            rash         2009

 

             Thorazine                              rash         2009

 

             Codeine Phosphate                           nausea/vomiting 

009







Medications





           Active Medications SIG        Qnty       Indications Ordering Provide

r Date

 

                          Clarithromycin                     500mg Tablets      

             SBE prior to 

dental work                                     Haris Telles JR, MD 

 

                          Acetaminophen                     325mg Tablets       

            1-2 every 4 

hours as needed                                 Haris Telles JR, MD 

020

 

                          Torsemide                     100mg Tablets           

        1 by mouth once a 

day                                             Haris Telles JR, MD 

020

 

                          Incruse Ellipta                     62.5mcg/Inh Aeroso

l                   1 puff

daily                                           Haris Telles JR, MD 

020

 

                          Zocor                     40mg Tablets                

   1 by mouth every night 

at bedtime                                      Unknown         2019

 

                          Spironolactone                     25mg Tablets       

            1 by mouth 

every day       90tabs          I10             Andrew Clay MD 02/15/2018

 

                                        I50.32

 

                          Klor-Con M10                     10Meq Tablets ER     

              1 by mouth 

twice a day     180tabs                         Andrew Clay MD 02/15/2018

 

                          Advair Diskus                     250-50mcg/Dose Aeros

ol                   1 

puff twice a day                                 Unknown         2018

 

                          Synthroid                     25mcg Tablets           

        1 by mouth every 

day                                             Unknown         2018

 

                                        Vitamin D3 Ultra Strength               

      5000Unit Capsules                 

             1 by mouth every day                           Unknown      

018

 

                    Aricept                     5mg Tablets                   1 

by mouth every day  

                                        Unknown             2018

 

                          Docusate Sodium                     100mg Capsules    

               1 by mouth 

twice a day                                     Unknown         2018

 

                                        Ipratropium Bromide/Albuterol Sulfate   

                  0.5-2.5(3)mg/3ML 

Solution                                1 nebulizer treatment 4 times a day (wit

h each meal 

and bedtime).                                   Unknown         2018

 

                    Calcium/Vitamin D                     500mg Tablets         

          1 po tid             

                                        Unknown             2009

 

             Multivitamins                      Tablets                   1 po d

aily                             

Unknown                                 2009

 

             Coumadin                     3mg Tablets                   as direc

zeke                            

Unknown                                 2009







Immunizations





                                        Description

 

                                        No Information Available







Vital Signs





                Date            Vital           Result          Comment

 

                2020  2:37pm Weight          154.00 lb        

 

                    Home Weight         157lb                

 

                    Height              66 inches           5'6"

 

                    BMI (Body Mass Index) 24.9 kg/m2           

 

                    Heart Rate          53 /min              

 

                    BP Systolic Sitting 118 mmHg            large cuff, Ra

 

                    BP Diastolic Sitting 58 mmHg             large cuff, Ra

 

                2020  1:36pm Weight          161.00 lb        

 

                    Home Weight         157lb                

 

                    Height              66 inches           5'6"

 

                    BMI (Body Mass Index) 26.0 kg/m2           

 

                    Heart Rate          51 /min              

 

                    BP Systolic Sitting 122 mmHg             

 

                    BP Diastolic Sitting 68 mmHg              







Results





        Test    Acquired Date Facility Test    Result  H/L     Range   Note

 

                    Complete Blood Count 09/10/2020          [facility]

rt

             WBC          7.4 x10*3/UL              4.1-10.9      

 

             RBC          5.02 x10*6/UL              4.20-6.30     

 

             Hemoglobin   14.8 g/dL                 12.0-18.0     

 

             Hematocrit   44.4 %                    37.0-51.0     

 

             MCV          88.5 fL                   80.0-97.0     

 

             MCH          29.4 pg                   26.0-32.0     

 

             MCHC         33.3 g/dL                 31.0-38.0     

 

             RDW          14.3 %       High         11.6-13.7     

 

             PLT          208 x10*3/UL              140-440       

 

             MPV          8.8 FL                    7.8-11.0      

 

             Lymph %      16.7 %                    10.0-58.5     

 

             Mid %        4.6 %                     1.7-9.3       

 

             Neut %       78.7 %                    37.0-92.0     

 

             Lymph #      1.2 x10*3/UL              0.6-4.1       

 

             Mid #        0.4 x10*3/UL              0.1-0.6       

 

             Neut #       5.8 x10*3/UL              2.0-7.8       







Procedures





                Date            Code            Description     Status

 

                2020      60229           ECG 12-Lead     Completed







Medical Devices





                                        Description

 

                                        No Information Available







Encounters





           Type       Date       Location   Provider   Dx         Diagnosis

 

           Office Visit 2020  2:15p Main Office KEATON Licea I11.0

      

Hypertensive heart disease with heart failure

 

                          I50.32                    Chronic diastolic (congestiv

e) heart failure

 

                          I05.1                     Rheumatic mitral insufficien

cy

 

                          Z95.2                     Presence of prosthetic heart

 valve

 

                          I48.21                    Permanent atrial fibrillatio

n

 

                          Z95.0                     Presence of cardiac pacemake

r

 

                          Z71.3                     Dietary counseling and surve

illance







Assessments





                Date            Code            Description     Provider

 

                2020      I11.0           Hypertensive heart disease with 

heart failure KEATON Licea

 

                2020      I50.32          Chronic diastolic (congestive) h

eart failure KEATON Licea

 

                2020      I05.1           Rheumatic mitral insufficiency A

llKEATON Mann

 

                2020      Z95.2           Presence of prosthetic heart zakia

ve KEATON Licea

 

                2020      I48.21          Permanent atrial fibrillation Al

KEATON Leavitt

 

                2020      Z95.0           Presence of cardiac pacemaker Al

KEATON Leavitt

 

                2020      Z71.3           Dietary counseling and surveilla

[facility] KEATON Licea







Plan of Treatment

Future Appointment(s):* 2021  2:15 pm - KEATON Velasquez at Main 
  Office

* 2021  2:15 pm - KEATON Velasquez at Main Office

2020 - KEATON Licea* I11.0 Hypertensive heart disease with heart 
  failure

* I50.32 Chronic diastolic (congestive) heart failure* Recommendations:* Please 
  let me know if your weight increases > 3 lbs overnight or 5 lbs in one week. 
  Limit sodium to less than 2000 mg daily and fluid less than 1.5 L daily.





* I05.1 Rheumatic mitral insufficiency

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

* I48.21 Permanent atrial fibrillation

* Z95.0 Presence of cardiac pacemaker

* Z71.3 Dietary counseling and surveillance* Recommendations:* Recommend 
  adopting a more whole foods, plant-based diet in addition to moderate exercise
  a minimum of 30 minutes 6 days a week.  In order to optimize cardiovascular 
  health please be conscious of processed foods, alcohol (no more than two dr
  inks a day for men and one drink a day for women), salt (<2000 mg/d), oils, 
  saturated fat/animal products, and highly refined carbohydrates such as 
  breads, pastas, and sweets.





* All * Follow up:* CV 3 months.









Functional Status





                Functional Condition Comment         Date            Status

 

                Independent with feeding                                 Active

 

                Independent with grooming                                 Active

 

                Independent with standing                                 Active

 

                          Requires assistance with ambulating Uses 4 wheeled wal

ker with seat and hand 

brakes                                              Active

 

                Requires assistance with bathing                                

 Active

 

                Requires assistance with dressing                               

  Active

 

                Requires assistance with toileting                              

   Active







Mental Status





                                        Description

 

                                        No Information Available







Referrals





                                        Description

 

                                        No Information Available

## 2021-01-22 NOTE — [facility] - ED

                                       

                                       Test Date:    2021

Pat Name:     GURWINDER LAGOS         Department:   

Patient ID:   [id number]                 Room:         -

Gender:       Female                   Technician:   DYLAN

:          1935               Requested By: Maja Lundborg-Gray 

Order Number: [id number]     Reading MD:   Maryan Gregory

                                 Measurements

Intervals                              Axis          

Rate:         61                       P:            

DC:           0                        QRS:          -68

QRSD:         138                      T:            -63

QT:           452                                    

QTc:          459                                    

                           Interpretive Statements

ATRIAL FIBRILLATION

MARKED LEFT AXIS DEVIATION

INTRAVENTRICULAR CONDUCTION DELAY

ANTERIOR MYOCARDIAL INFARCTION, age indeterminate

NSTTW abnormalities

Electronically Signed on 2021 20:52:25 EST by Maryan Gregory

## 2021-01-22 NOTE — CCD
Continuity of Care Document (CCD)

                             Created on: 10/29/2020



Xena Basilio

External Reference #: [id number]

: 1935

Sex: Female



Demographics





                          Address                   [address]

[place]  [address]

 

                          Home Phone                [phone]

 

                          Preferred Language        Unknown

 

                          Marital Status            

 

                          Episcopal Affiliation     Unknown

 

                          Race                      White

 

                          Ethnic Group              Not  or 





Author





                          Author                    Xena Bajwa

 

                          Organization              Unknown

 

                          Address                   [address] [facility] [address]

[place]  [address]



 

                          Phone                     [phone]







Care Team Providers





                    Care Team Member Name Role                Phone

 

                    Haris Telles JR, MD AUTM                Unavailable

 

                    Tiny Arnett  AUTM                [phone]

 

                    Kandy Rosas MD    AUTM                [phone]

 

                    Ronan Romero MD AUTM                [phone]

 

                    Екатерина Ward   AUTM                [phone]

 

                    [place] [facility]  AUTM                [phone]







Problems





                    Active Problems     Provider            Date

 

                    Anticoagulants Long Term (Current) Use                     O

nset: 1997

 

                    Atrial fibrillation                     Onset: 1997

 

                    Contact dermatitis  CLARISSA Bajwa    Onset: 2011

 

                    Heart valve replacement CLARISSA Bajwa    Onset: 

3

 

                    Mitral valve disorder CLARISSA Bajwa    Onset: 2013

 

                    Essential hypertension JOON Robin Onset: 

3

 

                    Anticoagulant agent CLARISSA Bajwa    Onset: 07/15/2015

 

                    Chronic atrial fibrillation Haris Telles MD Onset: 

 

                    Hypothyroidism      Haris Telles MD Onset: 10/04/2017







Social History





                Type            Date            Description     Comments

 

                Birth Sex                       Unknown          

 

                Tobacco Use     Start: Unknown End: Unknown Patient is a former 

smoker  

 

                Exercise Type/Frequency                 Exercises rarely  







Allergies, Adverse Reactions, Alerts





             Active Allergies Reaction     Severity     Comments     Date

 

             Codeine,PCN                                         2010

 

             Thorazine                                           2016







Medications





           Active Medications SIG        Qnty       Indications Ordering Provide

r Date

 

                          Xopenex                     0.63mg/3ML Nebulizer      

             use four 

times daily prn for wheezing and shortness of breath. DX J44.9 36ml             

                       Екатерина Nguyen [facility]                               10/26/2020

 

                                        Culturelle Digestive Health Probiotic   

                   Capsules             

                one twice daily while on antibiotics or if having diarrhea 30cap

s                          Екатерина Nguyen [facility]                               10/23/2020

 

                                        Vitamin D3 Ultra Strength               

      125mcg (5000 Ut) Capsules         

             1 by mouth every day 90caps                    Еактерина Nguyen [facility] 

 

                          Nebulizer Kit/Tubing/Mouthpiece                      K

it                   for 

use with nebulizer--use up to four times a day dx j44.9 1units                  

                Екатерина Nguyen 

[facility]                                     2019

 

                          Incruse Ellipta                     62.5mcg/Inh Aeroso

l                   inhale

one puff by mouth daily 3units                          Екатерина Nguyen [facility] 01/15/2

019

 

                          Levothyroxine Sodium                     25mcg Tablets

                   1 

tablet by mouth every day 90tabs                          Екатерина Nguyen [facility] 10/04

/2017

 

                          Aricept                     5mg Tablets               

    1 by mouth every day 

at bedtime      90tabs          G30.1           Sravan Franks M.D. 10/03/2017

 

                          Advair Diskus                     100-50mcg/Dose Aeros

ol                   

inhale one puff by mouth twice a day 180units                        Екатерина Nguyen [facility] 2017

 

                          Colace                     100mg Capsules             

      1 by mouth twice a 

day             180caps         K59.00          Екатерина Nguyen [facility] 2017

 

                          Calcium 500+D                     500-200mg-Unit Table

ts                   1 by 

mouth three times a day 270tabs                         Haris Telles MD 

 

                          Duoneb                     0.5-2.5(3)mg/3ML Solution  

                 inhale 

the contents of one vial via nebulizer four times a day as needed for wheezing 
or shortness of breath 270ml           R06.02          Екатерина Nguyen [facility] 05/15/20

17

 

                          Torsemide                     100mg Tablets           

        1tablet by mouth 

daily           30tabs          R60.0           Екатерина Nguyen [facility] 05/15/2017

 

                          Tylenol                     325mg Capsules            

       2 by mouth every 

day as needed   180caps                         Екатерина Nguyen [facility] 2017

 

                                        Albuterol Sulfate                     (2

.5mg/3ML) 0.083% Nebulizer              

             q.i.d. prn via nebulizer dx J44.9 75ml                      Екатерина Nguyen [facility] 2016

 

                          Coumadin                     3mg Tablets              

     take 1 tablet by 

mouth once a day as directed 90tabs                          Екатерина Nguyen, [facility] 

 

                          Simvastatin                     40mg Tablets          

         take one tablet 

by mouth at bedtime 90tabs                          Екатерина Nguyen [facility] 2015

 

                    Multi-Vitamins                      Tablets                 

  1 by mouth daily    

90tabs                                  Екатерина Nguyen, [facility]    2003

 

                          Spironolactone                     25mg Tablets       

            1 by mouth 

every day                                       Unknown         

 

                          Klor-Con M10                     10Meq Tablets ER     

              2 by mouth 

every day                                       Unknown         

 

                          Losartan Potassium                     25mg Tablets   

                1 by mouth

every day                                       Unknown         

 

                          Ciprofloxacin HCL                     500mg Tablets   

                1 tab by 

mouth twice daily                                 Unknown         

 

                          Metronidazole                     500mg Tablets       

            one by mouth 

three times a day for 10 days                                 Unknown         







Medications Administered in Office





           Medication SIG        Qnty       Indications Ordering Provider Date

 

                          Administration Of Flu Vaccine                      Inj

ection                    

                                                Екатерина Nguyen, [facility] 2019

 

                                        Immunization Adminstration,1 Vaccine/Tox

oid                      Injection      

                                                    [facility]

 

                          Administration Of Flu Vaccine                      Inj

ection                    

                                                Екатерина Nguyen, [facility] 10/19/2018

 

                          Administration Of Flu Vaccine                      Inj

ection                    

                                                Kelsea Adams, [facility] 10/03/2017

 

                          Administration Of Flu Vaccine                      Inj

ection                    

                                                Kelsea Adams, [facility] 10/13/2016

 

                          Administration Of Flu Vaccine                      Inj

ection                    

                                                Haris Telles MD 10/15/2015

 

                          Administration Of Flu Vaccine                      Inj

nonaion                    

                                                Haris Telles MD 10/23/2014

 

                          Administration Of Flu Vaccine                      Inj

bruce Telles MD 10/12/2012

 

                          Administration Of Flu Vaccine                      Inj

nonaion                    

                                                Haris Telles MD 10/11/2011

 

                          Administration Of Flu Vaccine                      Inj

nonaion                    

                                                Haris Telles MD 10/07/2010

 

                          Administration Of Flu Vaccine                      Inj

nonaion                    

                                                Haris Telles MD 10/08/2009

 

                          Administration Of Flu Vaccine                      Inj

ection                    

                                                Haris Telles MD 2008

 

                          Administration Of Flu Vaccine                      Inj

ection                    

                                                Haris Telles MD 10/18/2007

 

                Administration Of Zostavax                      Injection       

                                            

                          Haris Telles MD     2007

 

                          Administration Of Flu Vaccine                      Inj

ection                    

                                                JOON Robin 2006

 

                          Administration Of Flu Vaccine                      Inj

ection                    

                                                Haris Telles MD 10/04/2005

 

                          Administration Of Flu Vaccine                      Inj

ection                    

                                                CLARISSA Bajwa 10/19/2004

 

                          Administration Of Flu Vaccine                      Inj

ection                    

                                                Haris Telles MD 2003

 

                          Administration Of Flu Vaccine                      Inj

ection                    

                                                Екатерина [place], FNP 10/08/2002







Immunizations





             CPT Code     Status       Date         Vaccine      Lot #

 

             U-Flu        Given        10/05/2020   Influenza,Unspecified  

 

             02955        Given        2020   PPD          [id number]

 

                26769           Given           2019      Influenza Vaccin

e Quadrivalent Preser/Antibiotic Free Im 

Use                                     [id number]

 

             93250        Given        2019   Adacel- Tetanus Diphtheria P

ertussis (Age65 & Under) 

[id number]

 

             46030        Given        2019   Shingrix      

 

             41938        Given        2018   Shingrix      

 

                94364           Given           10/19/2018      Influenza Virus 

Vaccine, Quadrivalent (Cciiv4), Derived 

From Cell                               [id number]

 

                16768           Given           10/03/2017      Influenza Vaccin

e Quadrivalent Preser/Antibiotic Free Im 

Use                                     [id number]

 

                     Given        10/13/2016   Fluvirin Virus Vaccine 26321

01

 

                     Given        10/15/2015   Fluvirin Virus Vaccine 77695

01

 

             57872        Given        2015   Prevnar 13   [id number]

 

                     Given        10/23/2014   Fluvirin Virus Vaccine 94380

21

 

                     Given        10/12/2012   Fluvirin Virus Vaccine 23459

01

 

                     Given        10/11/2011   Fluvirin Virus Vaccine  

 

                     Given        10/11/2011   Fluvirin Virus Vaccine  

 

             88095        Given        10/07/2010   Influenza Virus Vaccine  

 

             58440        Given        2009   Pneumovax 23  

 

             09041        Given        10/08/2009   Influenza Virus Vaccine  

 

             35292        Given        2008   Influenza Virus Vaccine  

 

             70246        Given        10/18/2007   Influenza Virus Vaccine  

 

             12728        Given        2007   Zoster Vaccine  

 

             10201        Given        2006   Influenza Virus Vaccine  

 

             06090        Given        10/04/2005   Influenza Virus Vaccine  

 

             27720        Given        10/19/2004   Influenza Virus Vaccine  

 

             28000        Given        2003   Influenza Virus Vaccine  

 

             65868        Given        10/08/2002   Influenza Virus Vaccine  

 

             66801        Given        2001   Influenza Virus Vaccine  

 

             53390        Given        2001   Tetanus Toxoid  

 

             U-Pneum      Given        10/01/2000   Pneumococcal,Unspecified  

 

             04479        Given        10/21/1999   Influenza Virus Vaccine  

 

             08440        Given        10/25/1994   Influenza Virus Vaccine  







Vital Signs





                Date            Vital           Result          Comment

 

                10/29/2020 11:21am Heart Rate      82 /min          

 

                    Weight              163.00 lb            

 

                    O2 % BldC Oximetry  94 %                 

 

                09/10/2020  1:40pm BP Systolic     130 mmHg         

 

                    BP Diastolic        50 mmHg              

 

                    Heart Rate          56 /min              

 

                    Body Temperature    98.4 F             

 

                    Respiratory Rate    14 /min              

 

                    Height              65.5 inches         5'5.50"

 

                    Weight              147.00 lb            

 

                    O2 % BldC Oximetry  95 %                 

 

                    BMI (Body Mass Index) 24.1 kg/m2           







Results





        Test    Acquired Date Facility Test    Result  H/L     Range   Note

 

                    Type & Screen -Incl Blood Type,Walker,AB SC 10/20/2020         

 [facility]

                                        [address] [place] [address] [phone] Blood Type A POSITIVE  Normal                 

 

             AB Screen (Indirect Anatoly)Vis NEGATIVE     Normal                 

    

 

                    Laboratory test finding 10/20/2020          [facility]

                                        [address] [place] [address] [phone] Lipase     157 U/L    Normal           

 

             Thyroid Stimulating Hormone 3.480 uIU/ML Normal       0.358-3.740  

 

 

                    Liver Profile       10/20/2020          [facility]

nter

                                        [address] [place] [address] [phone] Ast/Sgot   23 U/L     Normal     7-37        

 

             Alt/SGPT     24 U/L       Normal       12-78         

 

             Alkaline Phosphatase 72 U/L       Normal               

 

             Bilirubin,Total 1.2 mg/dL    High         0.2-1.0       

 

             Bilirubin,Direct 0.4 mg/dL    High         0.0-0.2       

 

             Total Protein 6.7 GM/DL    Normal       6.4-8.2       

 

             Albumin      3.7 GM/DL    Normal       3.2-5.2       

 

             Albumin/Globulin Ratio 1.2          Normal       1.2-2.2       

 

                    Cardiac Marker Panel 10/20/2020          [facility]

enter

                                        [address] [place] [address] [phone] CPK Creatine Phosphokinase 62 U/L     Normal     26-19

2      

 

             CK-MB Value Mass < 1.0 NG/ML  Normal       <3.6          

 

             MB/CK Relative Index 1.61         Normal       < Or =4      1

 

             Troponin I   < 0.02 NG/ML Normal       < 0.10       2

 

                    Ua W/ Reflex To Culture 10/20/2020          [address] [address] [phone] Appearance, Urine RFX CLEAR      Normal     Clear     

  

 

             Color, Urine RFX YELLOW       Normal       Yellow        

 

             PH,Urine RFX 6.0 units    Normal       5.0-9.0       

 

             Specific Gravity Ur Auto RFX 1.012        Normal       1.002-1.035 

  

 

             Protein, Urine Auto RFX NEGATIVE mg/dL Normal       Negative      

 

             Glucose, Urine (Ua) Auto RFX NEGATIVE mg/dL Normal       Negative  

    

 

             Ketone, Urine Auto RFX NEGATIVE mg/dL Normal       Negative      

 

             Urobilinogen, Urine Auto RFX 0.2 mg/dL    Normal       0.0-2.0     

  

 

             Bilirubin, Urine Auto RFX NEGATIVE     Normal       Negative      

 

             Nitrite, Urine Auto RFX NEGATIVE     Normal       Negative      

 

             Leukocyte Esterase Ur Auto RFX NEGATIVE     Normal       Negative  

    

 

             Blood, Urine Blood RFX NEGATIVE     Normal       Negative      

 

             WBC, Urine Auto RFX 1 /HPF       Normal       0-3           

 

             RBC, Urine Auto RFX 1 /HPF       Normal       0-3           

 

             Bacteria, Urine Auto RFX NEGATIVE     Normal       Negative      

 

             Squam Epithelial Cell Ur Aurfx 0 /HPF       Normal       0-6       

    

 

             Hyaline Cast, Urine Auto RFX 0 /LPF       Normal       0-1         

  

 

                    Laboratory test finding 10/20/2020          [address] [address] [phone] Ammonia    < 10 uMOL/L Normal     <32         

 

                    CBC With Differential 10/20/2020          [address] [address] [phone] White Blood Count 14.2 10    High       4.0-10.0    

 

             Red Blood Count 4.35 10      Normal       4.00-5.40     

 

             Hemoglobin   13.1 g/dL    Normal       12.0-15.5     

 

             Hematocrit   40.2 %       Normal       36.0-47.0     

 

             Mean Corpuscular Volume 92.4 fl      Normal       80.0-96.0     

 

             Mean Corpuscular Hemoglobin 30.1 pg      Normal       27.0-33.0    

 

 

             Mean Corpuscular HGB Conc 32.6 g/dL    Normal       32.0-36.5     

 

             Red Cell Distribution Width 15.3 %       High         11.5-14.5    

 

 

             Platelet Count, Automated 178 10       Normal       150-450       

 

             Neutrophils % 75.0 %       High         36.0-66.0     

 

             Lymph %      5.6 %        Low          24.0-44.0     

 

             Mono %       17.9 %       High         0.0-5.0       

 

             Eos %        0.5 %        Normal       0.0-3.0       

 

             Baso %       0.4 %        Normal       0.0-1.0       

 

             Immature Granulocyte % 0.6 %        Normal       0-3.0         

 

             Nucleated Red Blood Cell % 0.0 %        Normal       0-0           

 

             Neutrophils # 10.7 10      High         1.5-8.5       

 

             Lymph #      0.8 10       Low          1.5-5.0       

 

             Mono #       2.5 10       High         0.0-0.8       

 

             Eos #        0.1 10       Normal       0.0-0.5       

 

             Baso #       0.1 10       Normal       0.0-0.2       

 

                    Istat PT/Inr        10/20/2020          [facility]

nter

                                        [address] [place] [address] [phone] iSTAT Protime Seconds 31.3 seconds High       12.1-14.

4   

 

             iSTAT Inr    2.7          Normal                     

 

                    Istat Chem8+ Panel  10/20/2020          [facility]

[address] [address] [phone] iSTAT HCT  41.0 %     Normal     38.0-51.0   

 

             iSTAT Glucose 107 mg/dL    High                 

 

             iSTAT Sodium 136 mEq/L    Normal       136-145       

 

             iSTAT Potassium 3.9 mEq/L    Normal       3.5-5.1       

 

             iSTAT CA++   5.1 mg/dL    Normal       4.5-5.3       

 

             iSTAT Chloride 95 mEq/L     Low                  

 

             iSTAT Co2    30.0 MM/L    High         23.0-27.0     

 

             iSTAT BUN    30 mg/dL     High         8-26          

 

             iSTAT Creatinine 1.2 mg/dL    Normal       0.6-1.3       

 

                    Laboratory test finding 10/20/2020          [facility]

                                        [address] [place] [address] [phone] iSTAT Lactate 0.90       Normal     0.4-2.0     

 

                    Laboratory test finding 10/20/2020          [address] [address] [phone] iSTAT Troponin 0.02 NG/ML Normal     0.00-0.08   

 

                    Complete Blood Count 09/10/2020          [facility]

s, pc

: Dr Haris Telles

[place], [place] [id number] [phone] WBC        7.4 x10*3/UL            4.1 - 10.9  

 

             RBC          5.02 x10*6/UL              4.20 - 6.30   

 

             Hemoglobin   14.8 g/dL                 12.0 - 18.0   

 

             Hematocrit   44.4 %                    37.0 - 51.0   

 

             MCV          88.5 fL                   80.0 - 97.0   

 

             MCH          29.4 pg                   26.0 - 32.0   

 

             MCHC         33.3 g/dL                 31.0 - 38.0   

 

             RDW          14.3 %       High         11.6 - 13.7   

 

             PLT          208 x10*3/UL              140 - 440     

 

             MPV          8.8 FL                    7.8 - 11.0    

 

             Lymph %      16.7 %                    10.0 - 58.5   

 

             Mid %        4.6 %                     1.7 - 9.3     

 

             Neut %       78.7 %                    37.0 - 92.0   

 

             Lymph #      1.2 x10*3/UL              0.6 - 4.1     

 

             Mid #        0.4 x10*3/UL              0.1 - 0.6     

 

             Neut #       5.8 x10*3/UL              2.0 - 7.8     

 

                    Basic Metabolic Panel 09/10/2020          [facility]

ts, pc

: Dr Haris Telles

[place] [address] [phone] Glucose    100 mg/dL  High       74 - 99    3

 

             BUN          24 mg/dL     High         7 - 18        

 

             Creatinine   1.4 mg/dL    High         0.6 - 1.3     

 

             Sodium       139 mEq/L                 136 - 145     

 

             Potassium    4.3 mEq/L                 3.5 - 5.1     

 

             Chloride     99 mEq/L                  98 - 107      

 

             Carbon Dioxide 36 mEq/L     High         21 - 32       

 

             Calcium      9.9 mg/dL                 8.5 - 10.1    

 

             GFR  36 mL/min    Low          >60           

 

             GFR African American 44 mL/min    Low          >60          4

 

                    Laboratory test finding 09/10/2020          [facility]

: Dr Haris Telles

[place] [address] [phone] Thyroid Stimulating Hormone 3.19 uIU/mL            0.3

6 - 3.74  

 

                    Laboratory test finding 2020          [facility]

             Inr          3.1                                     

 

                    Laboratory test finding 2020          [facility]

             Inr          2.5                                     

 

                    Complete Blood Count 06/10/2020          [facility]

[facility]

: Dr Haris Telles

[place] [address] [phone] WBC        8.1 x10*3/UL            4.1 - 10.9  

 

             RBC          5.07 x10*6/UL              4.20 - 6.30   

 

             Hemoglobin   14.8 g/dL                 12.0 - 18.0   

 

             Hematocrit   44.9 %                    37.0 - 51.0   

 

             MCV          88.5 fL                   80.0 - 97.0   

 

             MCH          29.3 pg                   26.0 - 32.0   

 

             MCHC         33.1 g/dL                 31.0 - 38.0   

 

             RDW          14.2 %       High         11.6 - 13.7   

 

             PLT          206 x10*3/UL              140 - 440     

 

             MPV          8.7 FL                    7.8 - 11.0    

 

             Lymph %      16.9 %                    10.0 - 58.5   

 

             Mid %        4.7 %                     1.7 - 9.3     

 

             Neut %       78.4 %                    37.0 - 92.0   

 

             Lymph #      1.3 x10*3/UL              0.6 - 4.1     

 

             Mid #        0.5 x10*3/UL              0.1 - 0.6     

 

             Neut #       6.3 x10*3/UL              2.0 - 7.8     

 

                    Basic Metabolic Panel 06/10/2020          [facility]

[facility]

: Dr Haris Telles

[place] [address] [phone] Glucose    76 mg/dL              74 - 99    5

 

             BUN          22 mg/dL     High         7 - 18        

 

             Creatinine   1.2 mg/dL                 0.6 - 1.3     

 

             Sodium       144 mEq/L                 136 - 145     

 

             Potassium    4.2 mEq/L                 3.5 - 5.1     

 

             Chloride     104 mEq/L                 98 - 107      

 

             Carbon Dioxide 33 mEq/L     High         21 - 32       

 

             Calcium      9.7 mg/dL                 8.5 - 10.1    

 

             GFR  43 mL/min    Low          >60           

 

             GFR African American 52 mL/min    Low          >60          6

 

                    Laboratory test finding 06/10/2020          [facility]

: Dr Haris Telles

[place] [address] [phone] Magnesium  2.1 mg/dL             1.8 - 2.4   







                          1                         DIAGNOSIS CRITERIA

MMB ng/ml       Relative Index (RI)

NON-AMI               < or = 5               N/A

GRAY ZONE              > 5                < or = 4

AMI                    > 5                   > 4



 

                          2                         Troponin I Reference Interva

l for Siemens Vista LOCI:



                                        99th Percentile= 0.00-0.045 ng/ml



Risk Stratification:

<= 0.10 ng/ml   Decreased Risk for Adverse Clinical

Events.

                                        0.10-1.50 ng/ml   Increased Risk for Adv

erse Clinical

Events. Evaluation of additional

criterion and/or repeat testing in 2-6

hours is suggested to rule out myocardial

damage.

>= 1.50 ng/ml   Indicative of Myocardial Injury.



 

                          3                         100-125 mg/dL     PRE-DIABET

ES/FASTING

>126 mg/dL          DIABETES/FASTING



 

                          4                         CHRONIC KIDNEY DISEASE STAGI

NG PER NKF



STAGE I & II      GFR >= 60        NORMAL TO MILDLY DECREASED

STAGE III          GFR 30-59          MODERATELY DECREASED

STAGE IV           GFR 15-29         SEVERELY DECREASED

STAGE V            GFR <15            VERY LITTLE GFR LEFT

ESRD                 GFR <15            ON RRT



 

                          5                         100-125 mg/dL     PRE-DIABET

ES/FASTING

>126 mg/dL          DIABETES/FASTING



 

                          6                         CHRONIC KIDNEY DISEASE STAGI

NG PER NKF



STAGE I & II      GFR >= 60        NORMAL TO MILDLY DECREASED

STAGE III          GFR 30-59          MODERATELY DECREASED

STAGE IV           GFR 15-29         SEVERELY DECREASED

STAGE V            GFR <15            VERY LITTLE GFR LEFT

ESRD                 GFR <15            ON RRT









Procedures





                Date            Code            Description     Status

 

                2018      [id number]       Diabetic Foot Exam Completed

 

                2017      [id number]        Mammogram       Completed

 

                01/15/2016      [id number]        Mammogram       Completed

 

                2015      [id number]        Mammogram       Completed

 

                2014      [id number]        Mammogram       Completed

 

                2013      [id number]       Diabetic Retinal Eye Exam Comple

[facility]

 

                2013      [id number]       Bone Mineral Density Test [facility]

 

                01/10/2013      [id number]        Mammogram       Completed

 

                2012      [id number]        Mammogram       Completed

 

                06/10/2011      [id number]       Bone Mineral Density Test Comple

[facility]

 

                2011      [id number]        Mammogram       Completed

 

                2009      [id number]        Mammogram       Completed

 

                2008      [id number]       Bone Mineral Density Test [facility]

 

                10/22/2004      [id number]        Colonoscopy     Completed







Medical Devices





                                        Description

 

                                        No Information Available







Encounters





           Type       Date       Location   Provider   Dx         Diagnosis

 

             Office Visit 09/10/2020  1:40p [facility] Екатерина Garcia Pi

ne, FNP I13.0

                                        Hyp hrt & chr kdny dis w hrt fail and st

g 1-4/unsp chr kdny

 

                          I50.42                    Chronic combined systolic an

d diastolic hrt fail

 

                          N18.3                     Chronic kidney disease, stag

e 3 (moderate)

 

                          J44.9                     Chronic obstructive pulmonar

y disease, unspecified

 

                          G30.1                     Alzheimer's disease with lat

e onset

 

                          F02.80                    Dementia in oth diseases [facility] elswhr w/o behavrl disturb

 

                          I48.21                    Permanent atrial fibrillatio

n

 

                          Z79.01                    Long term (current) use of a

nticoagulants

 

                          Z95.2                     Presence of prosthetic heart

 valve

 

                          I87.2                     Venous insufficiency (chroni

c) (peripheral)

 

             Office Visit 06/10/2020  2:00p [facility] Екатерина Lemus

ne, [facility] I13.0

                                        Hyp hrt & chr kdny dis w hrt fail and st

g 1-4/unsp chr kdny

 

                          I50.42                    Chronic combined systolic an

d diastolic hrt fail

 

                          N18.3                     Chronic kidney disease, stag

e 3 (moderate)

 

                          J44.9                     Chronic obstructive pulmonar

y disease, unspecified

 

                          G30.1                     Alzheimer's disease with lat

e onset

 

                          F02.80                    Dementia in oth diseases [facility] elsw[facility] w/o behavrl disturb

 

                          I48.21                    Permanent atrial fibrillatio

n

 

                          Z79.01                    Long term (current) use of a

nticoagulants

 

                          Z95.2                     Presence of prosthetic heart

 valve

 

                          Z13.89                    Encounter for screening for 

other disorder







Assessments





                Date            Code            Description     Provider

 

                2020      J44.9           Chronic obstructive pulmonary di

sease, unspecified Haris Telles MD

 

                2020      G30.1           Alzheimer's disease with late on

set Haris Telles MD

 

                2020      I48.21          Permanent atrial fibrillation Co

llnikki Telles MD

 

                2020      I10             Essential (primary) hypertension

 Haris Telles MD

 

                09/10/2020      I13.0           Hypertensive heart and chronic k

idney disease with heart lakshmi 

CLARISSA Bajwa

 

                    09/10/2020          I50.42              Chronic combined sys

tolic (congestive) and diastolic 

(congestive) heart failure              CLARISSA Bajwa

 

                09/10/2020      N18.3           Chronic kidney disease, stage 3 

(moderate) CLARISSA Bajwa

 

                09/10/2020      J44.9           Chronic obstructive pulmonary di

sease, unspecified CLARISSA Bajwa

 

                09/10/2020      G30.1           Alzheimer's disease with late on

set Екатерина Nguyen, [facility]

 

                    09/10/2020          F02.80              Dementia in other di

seases classified elsewhere without 

behavioral disturbance                  Екатерина Nguyen, [facility]

 

                09/10/2020      I48.21          Permanent atrial fibrillation An

n Radha Goncalves, [facility]

 

                09/10/2020      Z79.01          Long term (current) use of antic

oagulants Екатерина Nguyen, [facility]

 

                09/10/2020      Z95.2           Presence of prosthetic heart zakia

ve Екатерина Nguyen, [facility]

 

                09/10/2020      I87.2           Venous insufficiency (chronic) (

peripheral) Екатерина Nguyen, [facility]

 

                2020      Z11.1           Encounter for screening for resp

iratory tuberculosis Haris Telles MD

 

                2020      I48.21          Permanent atrial fibrillation An

marino Nguyen, [facility]

 

                2020      I48.21          Permanent atrial fibrillation Pr

otime

 

                2020      Z51.81          Encounter for therapeutic drug l

evel monitoring Екатерина Nguyen, 

[facility]

 

                2020      Z51.81          Encounter for therapeutic drug l

evel monitoring Pro[facility]

 

                2020      Z79.01          Long term (current) use of antic

oagulants Екатерина Nguyen, [facility]

 

                2020      Z79.01          Long term (current) use of antic

oagulants [facility]

 

                2020      I48.21          Permanent atrial fibrillation An

marino Nguyen, [facility]

 

                2020      I48.21          Permanent atrial fibrillation Pr

otime

 

                2020      Z51.81          Encounter for therapeutic drug l

evel monitoring Екатерина Nguyen, 

[facility]

 

                2020      Z79.01          Long term (current) use of antic

oagulants Екатерина Nguyen [facility]

 

                2020      I10             Essential (primary) hypertension

 Haris Telles MD

 

                2020      N18.3           Chronic kidney disease, stage 3 

(moderate) Haris Telles MD

 

                2020      J44.9           Chronic obstructive pulmonary di

sease, unspecified Haris Telles MD

 

                2020      G30.1           Alzheimer's disease with late on

set Haris Telles MD

 

                2020      N18.3           Chronic kidney disease, stage 3 

(moderate) Haris Telles MD

 

                2020      J44.9           Chronic obstructive pulmonary di

sease, unspecified Haris Telles MD

 

                2020      G30.1           Alzheimer's disease with late on

set Haris Telles MD

 

                2020      I10             Essential (primary) hypertension

 Haris Telles MD

 

                06/10/2020      I13.0           Hypertensive heart and chronic k

idney disease with heart lakshmi 

Екатерина Nguyen, [facility]

 

                    06/10/2020          I50.42              Chronic combined sys

tolic (congestive) and diastolic 

(congestive) heart failure              Екатерина Nguyen, [facility]

 

                06/10/2020      N18.3           Chronic kidney disease, stage 3 

(moderate) Екатерина Nguyen, [facility]

 

                06/10/2020      J44.9           Chronic obstructive pulmonary di

sease, unspecified Екатерина Nguyen,

[facility]

 

                06/10/2020      G30.1           Alzheimer's disease with late on

set Екатерина Nguyen, [facility]

 

                    06/10/2020          F02.80              Dementia in other di

seases classified elsewhere without 

behavioral disturbance                  Екатерина Nguyen, [facility]

 

                06/10/2020      I48.21          Permanent atrial fibrillation An

marino Nguyen, [facility]

 

                06/10/2020      Z79.01          Long term (current) use of antic

oagulants Екатерина Nguyen, [facility]

 

                06/10/2020      Z95.2           Presence of prosthetic heart zakia

ve Екатерина Nguyen, [facility]

 

                06/10/2020      Z13.89          Encounter for screening for othe

r disorder Екатерина Nguyen, [facility]

 

                2020      N18.3           Chronic kidney disease, stage 3 

(moderate) Haris Telles MD

 

                2020      J44.9           Chronic obstructive pulmonary di

sease, unspecified Haris Telles MD

 

                2020      I10             Essential (primary) hypertension

 Haris Telles MD

 

                2020      E66.3           Overweight      Haris srinivasan MD

 

                2020      N18.3           Chronic kidney disease, stage 3 

(moderate) Haris Telles MD

 

                2020      J44.9           Chronic obstructive pulmonary di

sease, unspecified Haris Telles MD

 

                2020      G30.1           Alzheimer's disease with late on

set Haris Telles MD

 

                2020      I10             Essential (primary) hypertension

 Haris Telles MD







Plan of Treatment

Future Appointment(s):* 2020  3:20 pm - CLARISSA Bajwa at [facility]





Functional Status





                                        Description

 

                                        No Information Available







Mental Status





                                        Description

 

                                        No Information Available







Referrals





                                        Description

 

                                        No Information Available

## 2021-01-22 NOTE — CCD
Continuity of Care Document

                             Created on: 2020



Xena Basilio

External Reference #: [id number]

: 1935

Sex: Female



Demographics





                          Address                   [address] [place] [address]

 

                          Preferred Language        English

 

                          Marital Status            Unknown

 

                          Quaker Affiliation     Unknown

 

                          Race                      Unknown

 

                          Ethnic Group              Unknown





Author





                          Author                    [employer]

d

 

                          Organization              Unknown

 

                          Address                   Unknown

 

                          Phone                     Unavailable







Care Team Providers





                    Care Team Member Name Role                Phone

 

                    Haris Telles    Unavailable          [phone]

 

                          Unavailable               Unavailable

 

                    Haris Telles    Unavailable          [phone]

 

                          Unavailable               Unavailable

 

                    Young, Karolina       Unavailable          [phone]

 

                    Tracie Almonte Unavailable          [phone]

 

                    Elva Mendel       Unavailable          [phone]



                                                  



Problems

                



                    Name                Dates               Details

 

                                                             Acute combined syst

olic (congestive) and diastolic 

(congestive) heart failure          (I50.41)

                          29-Oct-2020               Status: Active



                                                                         



Medications

                



                    Name                Dates               Details

 

                                        Xopenex 0.63 MG/3ML

every 6 hours as needed for wheezing or shortness of breath 

Haris Telles MD 

 







Active

Biaxin 500 MG

1 tab 1 hour prior to dental procedures

Bryan Telles MD 



                                         Start : 2020





Active

Ipratropium-Albuterol 0.5-2.5 (3) MG/3ML

every 6 hours as needed for shortness of breath

Bryan Telles MD 



                                         Start : 2020





Active

Klor-Con M10 10 MEQ



Bryan Telles MD 



                                         Start : 2020





Active

Spironolactone 25 MG



Bryan Telles MD 



                                         Start : 2020





Active

Multivitamin 



Bryan Telles MD 



                                         Start : 2020





Active

Simvastatin 40 MG



Bryan Telles MD 



                                         Start : 2020





Active

Coumadin 3 MG



Bryan Telles MD 



                                         Start : 2020





Active

Albuterol Sulfate (2.5 MG/3ML) 0.083%

via nebulizer for SOB, no more than 4x/day

Bryan Telles MD 



                                         Start : 2020





Active

Tylenol 

2 by mouth everyday as needed for pain >5, no more than 3,000 mg/day.

Bryan Telles MD 



                                         Start : 2020





Active

Torsemide 100 MG



Bryan Telles MD 



                                         Start : 2020





Active

Calcium 



Bryan Telles MD 



                                         Start : 2020





Active

Colace 



Bryan Telles MD 



                                         Start : 2020





Active

Advair Diskus 100-50 MCG/DOSE

one puff by dulce maria twice a day

Bryan Telles MD 



                                         Start : 2020





Active

Vitamin D 



Bryan Telles MD 



                                         Start : 2020





Active

Incruse Ellipta 62.5 MCG/INH



Bryan Telles MD 



                                         Start : 2020





Active

Levothyroxine 



Bryan Telles MD 



                                         Start : 2020





Active

Aricept 5 MG



Bryan Telles MD 



                                         Start : 2020





Active

Spironolactone 25 MG



Bryan Telles MD 



                           Start : 2018       End : 2018





Inactive

Aricept 5 MG



Bryan Telles MD 



                           Start : 2018       End : 2018





Inactive

Synthroid 25 MCG



Bryan Telles MD 



                           Start : 2018       End : 2018





Inactive

Multivitamin Adults 



Bryan Telles MD 



                           Start : 2018       End : 2018





Inactive

Potassium Chloride Makenzie ER 20 MEQ

2 tablets twice a day

Bryan Telles MD 



                           Start : 2018       End : 2018





Inactive

Vitamin D3 5000 UNIT



Bryan Telles MD 



                           Start : 2018       End : 2018





Inactive

Colace 100 MG

daily as needed for constipation

Bryan Telles MD 



                           Start : 2018       End : 2018





Inactive

predniSONE 10 MG

3 tablets for 3 days, 2 tablets for 3 days, 1 tablet for 3 days then stop.

Bryan Telles MD 



                           Start : 2018       End : 2018





Inactive

Mucinex 600 MG



Bryan Telles MD 



                           Start : 2018       End : 2018





Inactive

Coumadin 3 MG



Bryan Telles MD 



                           Start : 2018       End : 2018





Inactive

Albuterol Sulfate  (90 Base) MCG/ACT

2 puffs four times a day as needed for SOB

Bryan Telles MD 



                           Start : 2018       End : 2018





Inactive

Albuterol Sulfate (2.5 MG/3ML) 0.083%



Bryan Telles MD 



                           Start : 2018       End : 2018





Inactive

Calcium 500+D 500-200 MG-UNIT



Bryan Telles MD 



                           Start : 2018       End : 2018





Inactive

Advair Diskus 100-50 MCG/DOSE



Bryan Telles MD 



                           Start : 2018       End : 2018





Inactive

Simvastatin 40 MG



Bryan Telles MD 



                           Start : 2018       End : 2018





Inactive

Torsemide 100 MG

0800 and noon 

Bryan Telles MD 



                           Start : 2018       End : 2018





Inactive

Tylenol 325 MG

2 tablets Q6hrs as needed for pain. Max daily dose: 3000mg 

Bryan Telles MD 



                           Start : 2018       End : 2018





Inactive

                                                                                
                                                                                
                                                                                
                                                                                
                                                                                
        



Allergies and Adverse Reactions

          



                    Name                Dates               Details

 

                                         chlorpromazine (Allergy) Onset:    Status:  

Active 



 

                                         codeine (Allergy) Onset:  2020  

Status:  Active 



 

                                         Penicillin (Allergy) Onset:  

0  Status:  Active 





                                                                                
       



Results

                



                Date            Description     Value           Details

 

                                                                                

            

                                                  No Known Results              

 

                                                                

 

                                                                         



                                                                         



Plan of Care

                



                    Name                Dates               Details

 

                                        Instructions         

 

                                                  Diet:Regular Diet             

        

                                                                             Ins

truction Type: 

          Nutrition education                               

                           



                                                                         



Payers

                * Medicare - [facility]

* [facility] [employer]

## 2021-01-22 NOTE — CCD
Continuity of Care Document (CCD)

                             Created on: 2020



Xena Basilio

External Reference #: [id number]

: 1935

Sex: Female



Demographics





                          Address                   [address]

[place]  [address]

 

                          Home Phone                [phone]

 

                          Preferred Language        Unknown

 

                          Marital Status            

 

                          Confucianist Affiliation     Unknown

 

                          Race                      White

 

                          Ethnic Group              Not  or 





Author





                          Organization              Unknown

 

                          Address                   Unknown

 

                          Phone                     Unavailable







Care Team Providers





                    Care Team Member Name Role                Phone

 

                    Haris Telles JR, MD AUTM                Unavailable

 

                    Tiny Arnett  AUTM                [phone]

 

                    Kandy Rosas MD    AUTM                [phone]

 

                    Ronan Romero MD AUTM                [phone]

 

                    Екатерина Ward   AUTM                [phone]

 

                    [facility]  AUTM                [phone]







Problems





                    Active Problems     Provider            Date

 

                    Anticoagulants Long Term (Current) Use                     O

nset: 1997

 

                    Atrial fibrillation                     Onset: 1997

 

                    Contact dermatitis  CLARISSA Bajwa    Onset: 2011

 

                    Heart valve replacement CLARISSA Bajwa    Onset: 

3

 

                    Mitral valve disorder CLARISSA Bajwa    Onset: 2013

 

                    Essential hypertension JOON Robin Onset: 

3

 

                    Anticoagulant agent CLARISSA Bajwa    Onset: 07/15/2015

 

                    Chronic atrial fibrillation Haris Telles MD Onset: 

 

                    Hypothyroidism      Haris Telles MD Onset: 10/04/2017







Social History





                Type            Date            Description     Comments

 

                Birth Sex                       Unknown          

 

                Tobacco Use     Start: Unknown End: Unknown Patient is a former 

smoker  

 

                Exercise Type/Frequency                 Exercises rarely  







Allergies, Adverse Reactions, Alerts





             Active Allergies Reaction     Severity     Comments     Date

 

             Codeine,PCN                                         2010

 

             Thorazine                                           2016







Medications





           Active Medications SIG        Qnty       Indications Ordering Provide

r Date

 

                          Xopenex                     0.63mg/3ML Nebulizer      

             use four 

times daily prn for wheezing and shortness of breath. DX J44.9 36ml             

                       CLARISSA Bajwa                               10/26/2020

 

                                        [facility] Probiotic   

                   Capsules             

                one twice daily while on antibiotics or if having diarrhea 30cap

s                          Екатерина Nguyen [facility]                               10/23/2020

 

                                        Vitamin D3 Ultra Strength               

      125mcg (5000 Ut) Capsules         

             1 by mouth every day 90caps                    Екатерина Nguyen [facility] 

 

                          Nebulizer Kit/Tubing/Mouthpiece                      K

it                   for 

use with nebulizer--use up to four times a day dx j44.9 1units                  

                Екатерина Ngyuen 

[facility]                                     2019

 

                          Incruse Ellipta                     62.5mcg/Inh Aeroso

l                   inhale

one puff by mouth daily 3units                          Екатерина Nguyen [facility] 01/15/2

019

 

                          Levothyroxine Sodium                     25mcg Tablets

                   1 

tablet by mouth every day 90tabs                          Екатерина Nguyen [facility] 10/04

/2017

 

                          Aricept                     5mg Tablets               

    1 by mouth every day 

at bedtime      90tabs          G30.1           Sravan Franks M.D. 10/03/2017

 

                          Advair Diskus                     100-50mcg/Dose Aeros

ol                   

inhale one puff by mouth twice a day 180units                        Екатерина Nguyen [facility] 2017

 

                          Colace                     100mg Capsules             

      1 by mouth twice a 

day             180caps         K59.00          KEATON Akins JR 

017

 

                          Calcium 500+D                     500-200mg-Unit Table

ts                   1 by 

mouth three times a day 270tabs                         Haris Telles MD 

 

                          Duoneb                     0.5-2.5(3)mg/3ML Solution  

                 inhale 

the contents of one vial via nebulizer four times a day as needed for wheezing 
or shortness of breath 270ml           R06.02          Екатерина Nguyen [facility] 05/15/20

17

 

                          Torsemide                     100mg Tablets           

        1tablet by mouth 

daily           30tabs          R60.0           Екатерина Nguyen [facility] 05/15/2017

 

                          Tylenol                     325mg Capsules            

       2 by mouth every 

day as needed   180caps                         Екатерина Nguyen [facility] 2017

 

                                        Albuterol Sulfate                     (2

.5mg/3ML) 0.083% Nebulizer              

             q.i.d. prn via nebulizer dx J44.9 75ml                      DIOGO Bajwa 2016

 

                          Coumadin                     3mg Tablets              

     take 1 tablet by 

mouth once a day as directed 90tabs                          Екатерина Nguyen [facility] 

 

                          Simvastatin                     40mg Tablets          

         take one tablet 

by mouth at bedtime 90tabs                          Екатерина Nguyen [facility] 2015

 

                    Multi-Vitamins                      Tablets                 

  1 by mouth daily    

90tabs                                  Екатерина Nguyen, [facility]    2003

 

                          Spironolactone                     25mg Tablets       

            1 by mouth 

every day                                       Unknown         

 

                          Klor-Con M10                     10Meq Tablets ER     

              2 by mouth 

every day                                       Unknown         

 

                          Losartan Potassium                     25mg Tablets   

                1 by mouth

every day                                       Unknown         

 

                          Ciprofloxacin HCL                     500mg Tablets   

                1 tab by 

mouth twice daily                                 Unknown         

 

                          Metronidazole                     500mg Tablets       

            one by mouth 

three times a day for 10 days                                 Unknown         







Medications Administered in Office





           Medication SIG        Qnty       Indications Ordering Provider Date

 

                          Administration Of Flu Vaccine                      Inj

ection                    

                                                Екатерина Nguyen, [facility] 2019

 

                                        Immunization Adminstration,1 Vaccine/Tox

oid                      Injection      

                                                    Екатерина Nguyen, [facility] 2019

 

                          Administration Of Flu Vaccine                      Inj

ection                    

                                                Екатерина Nguyen, [facility] 10/19/2018

 

                          Administration Of Flu Vaccine                      Inj

ection                    

                                                Kelsea Adams [facility] 10/03/2017

 

                          Administration Of Flu Vaccine                      Inj

ection                    

                                                Kelsea Adams, [facility] 10/13/2016

 

                          Administration Of Flu Vaccine                      Inj

ection                    

                                                Haris Telles MD 10/15/2015

 

                          Administration Of Flu Vaccine                      Inj

nonaion                    

                                                Haris Telles MD 10/23/2014

 

                          Administration Of Flu Vaccine                      Inj

nonaion                    

                                                Haris Telles MD 10/12/2012

 

                          Administration Of Flu Vaccine                      Inj

nonaion                    

                                                Haris Telles MD 10/11/2011

 

                          Administration Of Flu Vaccine                      Inj

bruce Telles MD 10/07/2010

 

                          Administration Of Flu Vaccine                      Inj

bruce Telles MD 10/08/2009

 

                          Administration Of Flu Vaccine                      Inj

nonaion                    

                                                Haris Telles MD 2008

 

                          Administration Of Flu Vaccine                      Inj

nonaion                    

                                                Haris Telles MD 10/18/2007

 

                Administration Of Zostavax                      Injection       

                                            

                          Haris Telles MD     2007

 

                          Administration Of Flu Vaccine                      Inj

ection                    

                                                JOON Robin 2006

 

                          Administration Of Flu Vaccine                      Inj

ection                    

                                                Haris Telles MD 10/04/2005

 

                          Administration Of Flu Vaccine                      Inj

ection                    

                                                DIOGO Bajwa 10/19/2004

 

                          Administration Of Flu Vaccine                      Inj

nonaion                    

                                                Haris Telles MD 2003

 

                          Administration Of Flu Vaccine                      Inj

CLARISSA Irby 10/08/2002







Immunizations





             CPT Code     Status       Date         Vaccine      Lot #

 

             U-Flu        Given        10/05/2020   Influenza,Unspecified  

 

             [id number]        Given        2020   PPD          [id number]

 

                33557           Given           2019      Influenza Vaccin

e Quadrivalent Preser/Antibiotic Free Im 

Use                                     [id number]

 

             58321        Given        2019   Adacel- Tetanus Diphtheria P

ertussis (Age65 & Under) 

[id number]

 

             06586        Given        2019   Shingrix      

 

             86484        Given        2018   Shingrix      

 

                09659           Given           10/19/2018      Influenza Virus 

Vaccine, Quadrivalent (Cciiv4), Derived 

From Cell                               [id number]

 

                05298           Given           10/03/2017      Influenza Vaccin

e Quadrivalent Preser/Antibiotic Free Im 

Use                                     [id number]

 

                     Given        10/13/2016   Fluvirin Virus Vaccine 10374

01

 

                     Given        10/15/2015   Fluvirin Virus Vaccine 45896

01

 

             90079        Given        2015   Prevnar 13   [id number]

 

                     Given        10/23/2014   Fluvirin Virus Vaccine 71455

21

 

                     Given        10/12/2012   Fluvirin Virus Vaccine 25180

01

 

                     Given        10/11/2011   Fluvirin Virus Vaccine  

 

                     Given        10/11/2011   Fluvirin Virus Vaccine  

 

             95672        Given        10/07/2010   Influenza Virus Vaccine  

 

             63380        Given        2009   Pneumovax 23  

 

             93487        Given        10/08/2009   Influenza Virus Vaccine  

 

             38243        Given        2008   Influenza Virus Vaccine  

 

             02095        Given        10/18/2007   Influenza Virus Vaccine  

 

             10340        Given        2007   Zoster Vaccine  

 

             25297        Given        2006   Influenza Virus Vaccine  

 

             34605        Given        10/04/2005   Influenza Virus Vaccine  

 

             45906        Given        10/19/2004   Influenza Virus Vaccine  

 

             93322        Given        2003   Influenza Virus Vaccine  

 

             18283        Given        10/08/2002   Influenza Virus Vaccine  

 

             12798        Given        2001   Influenza Virus Vaccine  

 

             78497        Given        2001   Tetanus Toxoid  

 

             U-Pneum      Given        10/01/2000   Pneumococcal,Unspecified  

 

             75032        Given        10/21/1999   Influenza Virus Vaccine  

 

             80137        Given        10/25/1994   Influenza Virus Vaccine  







Vital Signs





                Date            Vital           Result          Comment

 

                10/29/2020 11:21am Heart Rate      82 /min          

 

                    Weight              163.00 lb            

 

                    O2 % BldC Oximetry  94 %                 

 

                09/10/2020  1:40pm BP Systolic     130 mmHg         

 

                    BP Diastolic        50 mmHg              

 

                    Heart Rate          56 /min              

 

                    Body Temperature    98.4 F             

 

                    Respiratory Rate    14 /min              

 

                    Height              65.5 inches         5'5.50"

 

                    Weight              147.00 lb            

 

                    O2 % BldC Oximetry  95 %                 

 

                    BMI (Body Mass Index) 24.1 kg/m2           







Results





        Test    Acquired Date Facility Test    Result  H/L     Range   Note

 

                    Laboratory test finding 2020          [facility]

                                        [address] [place] [address] [phone] iSTAT Troponin 0.01 NG/ML Normal     0.00-0.08   

 

                    Istat Chem8+ Panel  2020          [facility]

nter

                                        [address] [place] [address] [phone] iSTAT HCT  40.0 %     Normal     38.0-51.0   

 

             iSTAT Glucose 89 mg/dL     Normal               

 

             iSTAT Sodium 138 mEq/L    Normal       136-145       

 

             iSTAT Potassium 3.7 mEq/L    Normal       3.5-5.1       

 

             iSTAT CA++   4.6 mg/dL    Normal       4.5-5.3       

 

             iSTAT Chloride 98 mEq/L     Normal               

 

             iSTAT Co2    32.0 MM/L    High         23.0-27.0     

 

             iSTAT BUN    26 mg/dL     Normal       8-26          

 

             iSTAT Creatinine 1.1 mg/dL    Normal       0.6-1.3       

 

                    Laboratory test finding 2020          [address] [address] [phone] Bedside Glucose 83 mg/dL   Normal           

 

                    CBC With Differential 2020          [address] [address] [phone] White Blood Count 8.5 10     Normal     4.0-10.0    

 

             Red Blood Count 4.35 10      Normal       4.00-5.40     

 

             Hemoglobin   13.0 g/dL    Normal       12.0-15.5     

 

             Hematocrit   40.6 %       Normal       36.0-47.0     

 

             Mean Corpuscular Volume 93.3 fl      Normal       80.0-96.0     

 

             Mean Corpuscular Hemoglobin 29.9 pg      Normal       27.0-33.0    

 

 

             Mean Corpuscular HGB Conc 32.0 g/dL    Normal       32.0-36.5     

 

             Red Cell Distribution Width 15.2 %       High         11.5-14.5    

 

 

             Platelet Count, Automated 194 10       Normal       150-450       

 

             Neutrophils % 65.5 %       Normal       36.0-66.0     

 

             Lymph %      12.8 %       Low          24.0-44.0     

 

             Mono %       18.0 %       High         0.0-5.0       

 

             Eos %        2.2 %        Normal       0.0-3.0       

 

             Baso %       1.1 %        High         0.0-1.0       

 

             Immature Granulocyte % 0.4 %        Normal       0-3.0         

 

             Nucleated Red Blood Cell % 0.0 %        Normal       0-0           

 

             Neutrophils # 5.5 10       Normal       1.5-8.5       

 

             Lymph #      1.1 10       Low          1.5-5.0       

 

             Mono #       1.5 10       High         0.0-0.8       

 

             Eos #        0.2 10       Normal       0.0-0.5       

 

             Baso #       0.1 10       Normal       0.0-0.2       

 

                    Laboratory test finding 2020          [facility]

                                        [address] [place] [address] [phone] Thyroid Stimulating Hormone 5.120 uIU/ML High       0.

358-3.740  

 

                    Prothrombin Time/Inr 2020          [facility]

enter

                                        [address] [place] [address] [phone] Prothrombin Time 26.5 seconds High       12.5-14.3   

 

             Inr          2.38         Normal                    1

 

                    Complete Blood Count 2020          [facility]

enter

                                        [address] [place] [address] [phone] White Blood Count 7.8 10     Normal     4.0-10.0    

 

             Red Blood Count 4.46 10      Normal       4.00-5.40     

 

             Hemoglobin   13.3 g/dL    Normal       12.0-15.5     

 

             Hematocrit   41.6 %       Normal       36.0-47.0     

 

             Mean Corpuscular Volume 93.3 fl      Normal       80.0-96.0     

 

             Mean Corpuscular Hemoglobin 29.8 pg      Normal       27.0-33.0    

 

 

             Mean Corpuscular HGB Conc 32.0 g/dL    Normal       32.0-36.5     

 

             Red Cell Distribution Width 15.9 %       High         11.5-14.5    

 

 

             Platelet Count, Automated 231 10       Normal       150-450       

 

             Nucleated Red Blood Cell % 0.0 %        Normal       0-0           

 

                    Comprehensive Metabolic Profil 2020          [facility]

                                        [address] [place] [address] [phone] Glucose, Fasting 99 mg/dL   Normal           

 

             Blood Urea Nitrogen 21 mg/dL     High         7-18          

 

             Creatinine For GFR 1.07 mg/dL   Normal       0.55-1.30     

 

             Glomerular Filtration Rate 52.1         Normal       >32          2

 

             Sodium Level 136 mEq/L    Normal       136-145       

 

             Potassium Serum 4.2 mEq/L    Normal       3.5-5.1       

 

             Chloride Level 95 mEq/L     Low                  

 

             Carbon Dioxide Level 35 mEq/L     High         21-32         

 

             Anion Gap    6 mEq/L      Low          8-16          

 

             Calcium Level 10.1 mg/dL   Normal       8.8-10.2      

 

             Ast/Sgot     32 U/L       Normal       7-37          

 

             Alt/SGPT     23 U/L       Normal       12-78         

 

             Alkaline Phosphatase 66 U/L       Normal               

 

             Bilirubin,Total 0.7 mg/dL    Normal       0.2-1.0       

 

             Total Protein 6.6 GM/DL    Normal       6.4-8.2       

 

             Albumin      3.7 GM/DL    Normal       3.2-5.2       

 

             Albumin/Globulin Ratio 1.3          Normal       1.2-2.2       

 

                    Type & Screen -Incl Blood Type,Walker,AB SC 10/20/2020         

 [address] [address] [phone] Blood Type A POSITIVE  Normal                 

 

             AB Screen (Indirect Anatoly)Vis NEGATIVE     Normal                 

    

 

                    Laboratory test finding 10/20/2020          [facility]

                                        [address] [place] [address] [phone] Lipase     157 U/L    Normal           

 

             Thyroid Stimulating Hormone 3.480 uIU/ML Normal       0.358-3.740  

 

 

                    Liver Profile       10/20/2020          [facility]

nter

                                        [address] [place] [address] [phone] Ast/Sgot   23 U/L     Normal     7-37        

 

             Alt/SGPT     24 U/L       Normal       12-78         

 

             Alkaline Phosphatase 72 U/L       Normal               

 

             Bilirubin,Total 1.2 mg/dL    High         0.2-1.0       

 

             Bilirubin,Direct 0.4 mg/dL    High         0.0-0.2       

 

             Total Protein 6.7 GM/DL    Normal       6.4-8.2       

 

             Albumin      3.7 GM/DL    Normal       3.2-5.2       

 

             Albumin/Globulin Ratio 1.2          Normal       1.2-2.2       

 

                    Cardiac Marker Panel 10/20/2020          [facility]

enter

                                        [address] [place] [address] [phone] CPK Creatine Phosphokinase 62 U/L     Normal     26-19

2      

 

             CK-MB Value Mass < 1.0 NG/ML  Normal       <3.6          

 

             MB/CK Relative Index 1.61         Normal       < Or =4      3

 

             Troponin I   < 0.02 NG/ML Normal       < 0.10       4

 

                    Ua W/ Reflex To Culture 10/20/2020          [address] [address] [phone] Appearance, Urine RFX CLEAR      Normal     Clear     

  

 

             Color, Urine RFX YELLOW       Normal       Yellow        

 

             PH,Urine RFX 6.0 units    Normal       5.0-9.0       

 

             Specific Gravity Ur Auto RFX 1.012        Normal       1.002-1.035 

  

 

             Protein, Urine Auto RFX NEGATIVE mg/dL Normal       Negative      

 

             Glucose, Urine (Ua) Auto RFX NEGATIVE mg/dL Normal       Negative  

    

 

             Ketone, Urine Auto RFX NEGATIVE mg/dL Normal       Negative      

 

             Urobilinogen, Urine Auto RFX 0.2 mg/dL    Normal       0.0-2.0     

  

 

             Bilirubin, Urine Auto RFX NEGATIVE     Normal       Negative      

 

             Nitrite, Urine Auto RFX NEGATIVE     Normal       Negative      

 

             Leukocyte Esterase Ur Auto RFX NEGATIVE     Normal       Negative  

    

 

             Blood, Urine Blood RFX NEGATIVE     Normal       Negative      

 

             WBC, Urine Auto RFX 1 /HPF       Normal       0-3           

 

             RBC, Urine Auto RFX 1 /HPF       Normal       0-3           

 

             Bacteria, Urine Auto RFX NEGATIVE     Normal       Negative      

 

             Squam Epithelial Cell Ur Aurfx 0 /HPF       Normal       0-6       

    

 

             Hyaline Cast, Urine Auto RFX 0 /LPF       Normal       0-1         

  

 

                    Laboratory test finding 10/20/2020          [address] [place] [address] [phone] Ammonia    < 10 uMOL/L Normal     <32         

 

                    CBC With Differential 10/20/2020          [address] [address] [phone] White Blood Count 14.2 10    High       4.0-10.0    

 

             Red Blood Count 4.35 10      Normal       4.00-5.40     

 

             Hemoglobin   13.1 g/dL    Normal       12.0-15.5     

 

             Hematocrit   40.2 %       Normal       36.0-47.0     

 

             Mean Corpuscular Volume 92.4 fl      Normal       80.0-96.0     

 

             Mean Corpuscular Hemoglobin 30.1 pg      Normal       27.0-33.0    

 

 

             Mean Corpuscular HGB Conc 32.6 g/dL    Normal       32.0-36.5     

 

             Red Cell Distribution Width 15.3 %       High         11.5-14.5    

 

 

             Platelet Count, Automated 178 10       Normal       150-450       

 

             Neutrophils % 75.0 %       High         36.0-66.0     

 

             Lymph %      5.6 %        Low          24.0-44.0     

 

             Mono %       17.9 %       High         0.0-5.0       

 

             Eos %        0.5 %        Normal       0.0-3.0       

 

             Baso %       0.4 %        Normal       0.0-1.0       

 

             Immature Granulocyte % 0.6 %        Normal       0-3.0         

 

             Nucleated Red Blood Cell % 0.0 %        Normal       0-0           

 

             Neutrophils # 10.7 10      High         1.5-8.5       

 

             Lymph #      0.8 10       Low          1.5-5.0       

 

             Mono #       2.5 10       High         0.0-0.8       

 

             Eos #        0.1 10       Normal       0.0-0.5       

 

             Baso #       0.1 10       Normal       0.0-0.2       

 

                    Istat PT/Inr        10/20/2020          [address] [place], [place] [id number] [phone] iSTAT Protime Seconds 31.3 seconds High       12.1-14.

4   

 

             iSTAT Inr    2.7          Normal                     

 

                    Istat Chem8+ Panel  10/20/2020          [facility]

nter

                                        [address] [place], [place] [id number] [phone] iSTAT HCT  41.0 %     Normal     38.0-51.0   

 

             iSTAT Glucose 107 mg/dL    High                 

 

             iSTAT Sodium 136 mEq/L    Normal       136-145       

 

             iSTAT Potassium 3.9 mEq/L    Normal       3.5-5.1       

 

             iSTAT CA++   5.1 mg/dL    Normal       4.5-5.3       

 

             iSTAT Chloride 95 mEq/L     Low                  

 

             iSTAT Co2    30.0 MM/L    High         23.0-27.0     

 

             iSTAT BUN    30 mg/dL     High         8-26          

 

             iSTAT Creatinine 1.2 mg/dL    Normal       0.6-1.3       

 

                    Laboratory test finding 10/20/2020          [facility]

                                        [address] [place], [place] [id number] [phone] iSTAT Lactate 0.90       Normal     0.4-2.0     

 

                    Laboratory test finding 10/20/2020          [facility]

                                        [address] [place] [address] [phone] iSTAT Troponin 0.02 NG/ML Normal     0.00-0.08   

 

                    Complete Blood Count 09/10/2020          [facility]

s, pc

: Dr Haris Telles

[place] [address] [phone] WBC        7.4 x10*3/UL            4.1 - 10.9  

 

             RBC          5.02 x10*6/UL              4.20 - 6.30   

 

             Hemoglobin   14.8 g/dL                 12.0 - 18.0   

 

             Hematocrit   44.4 %                    37.0 - 51.0   

 

             MCV          88.5 fL                   80.0 - 97.0   

 

             MCH          29.4 pg                   26.0 - 32.0   

 

             MCHC         33.3 g/dL                 31.0 - 38.0   

 

             RDW          14.3 %       High         11.6 - 13.7   

 

             PLT          208 x10*3/UL              140 - 440     

 

             MPV          8.8 FL                    7.8 - 11.0    

 

             Lymph %      16.7 %                    10.0 - 58.5   

 

             Mid %        4.6 %                     1.7 - 9.3     

 

             Neut %       78.7 %                    37.0 - 92.0   

 

             Lymph #      1.2 x10*3/UL              0.6 - 4.1     

 

             Mid #        0.4 x10*3/UL              0.1 - 0.6     

 

             Neut #       5.8 x10*3/UL              2.0 - 7.8     

 

                    Basic Metabolic Panel 09/10/2020          [facility]

[facility]

: Dr Haris Telles

[place] [address] [phone] Glucose    100 mg/dL  High       74 - 99    5

 

             BUN          24 mg/dL     High         7 - 18        

 

             Creatinine   1.4 mg/dL    High         0.6 - 1.3     

 

             Sodium       139 mEq/L                 136 - 145     

 

             Potassium    4.3 mEq/L                 3.5 - 5.1     

 

             Chloride     99 mEq/L                  98 - 107      

 

             Carbon Dioxide 36 mEq/L     High         21 - 32       

 

             Calcium      9.9 mg/dL                 8.5 - 10.1    

 

             GFR  36 mL/min    Low          >60           

 

             GFR African American 44 mL/min    Low          >60          6

 

                    Laboratory test finding 09/10/2020          [facility]

[facility]

: Dr Haris Telles

[place] [address] [phone] Thyroid Stimulating Hormone 3.19 uIU/mL            0.3

6 - 3.74  

 

                    Laboratory test finding 2020          [facility]

             Inr          3.1                                     

 

                    Laboratory test finding 2020          [facility]

             Inr          2.5                                     

 

                    Complete Blood Count 06/10/2020          [facility]

[facility], pc

: Dr Haris Telles

[place] [address] [phone] WBC        8.1 x10*3/UL            4.1 - 10.9  

 

             RBC          5.07 x10*6/UL              4.20 - 6.30   

 

             Hemoglobin   14.8 g/dL                 12.0 - 18.0   

 

             Hematocrit   44.9 %                    37.0 - 51.0   

 

             MCV          88.5 fL                   80.0 - 97.0   

 

             MCH          29.3 pg                   26.0 - 32.0   

 

             MCHC         33.1 g/dL                 31.0 - 38.0   

 

             RDW          14.2 %       High         11.6 - 13.7   

 

             PLT          206 x10*3/UL              140 - 440     

 

             MPV          8.7 FL                    7.8 - 11.0    

 

             Lymph %      16.9 %                    10.0 - 58.5   

 

             Mid %        4.7 %                     1.7 - 9.3     

 

             Neut %       78.4 %                    37.0 - 92.0   

 

             Lymph #      1.3 x10*3/UL              0.6 - 4.1     

 

             Mid #        0.5 x10*3/UL              0.1 - 0.6     

 

             Neut #       6.3 x10*3/UL              2.0 - 7.8     

 

                    Basic Metabolic Panel 06/10/2020          [facility]

[facility]

: Dr Haris Telles

[place] [address] [phone] Glucose    76 mg/dL              74 - 99    7

 

             BUN          22 mg/dL     High         7 - 18        

 

             Creatinine   1.2 mg/dL                 0.6 - 1.3     

 

             Sodium       144 mEq/L                 136 - 145     

 

             Potassium    4.2 mEq/L                 3.5 - 5.1     

 

             Chloride     104 mEq/L                 98 - 107      

 

             Carbon Dioxide 33 mEq/L     High         21 - 32       

 

             Calcium      9.7 mg/dL                 8.5 - 10.1    

 

             GFR  43 mL/min    Low          >60           

 

             GFR African American 52 mL/min    Low          >60          8

 

                    Laboratory test finding 06/10/2020          [place] [facility]

: Dr Haris Telles

[place] [address] [phone] Magnesium  2.1 mg/dL             1.8 - 2.4   







                          1                         THERAPUTIC HUMAN INR VALUES

INDICATIONS                      NORMAL RANGES

PROPHYLAXIS/TREATMENT OF:

VENOUS THROMBOSIS                2.0-3.0

PULMONARY EMBOLISM               2.0-3.0

PREVENTION OF SYSTEMIC EMBOLISM FROM:

TISSUE HEART VALVES              2.0-3.0

ACUTE MYOCARDIAL INFARCTION      2.0-3.0

VALVULAR HEART DISEASE           2.0-3.0

ATRIAL FIBRILLATION              2.0-3.0

MECHANICAL VALVES(HIGH RISK)     2.5-3.5

RECURRENT MYOCARDIAL INFARCTION  2.5-3.5



 

                          2                         Units are mL/min/1.73 m2



Chronic Kidney Disease Staging per NKF:



Stage I & II   GFR >=60       Normal to Mildly Decreased

Stage III      GFR 30-59      Moderately Decreased

Stage IV       GFR 15-29      Severely Decreased

Stage V        GFR <15        Very Little GFR Left

ESRD           GFR <15 on RRT



 

                          3                         DIAGNOSIS CRITERIA

MMB ng/ml       Relative Index (RI)

NON-AMI               < or = 5               N/A

GRAY ZONE              > 5                < or = 4

AMI                    > 5                   > 4



 

                          4                         Troponin I Reference Interva

l for [employer] Vista LOCI:



                                        99th Percentile= 0.00-0.045 ng/ml



Risk Stratification:

<= 0.10 ng/ml   Decreased Risk for Adverse Clinical

Events.

                                        0.10-1.50 ng/ml   Increased Risk for Adv

erse Clinical

Events. Evaluation of additional

criterion and/or repeat testing in 2-6

hours is suggested to rule out myocardial

damage.

>= 1.50 ng/ml   Indicative of Myocardial Injury.



 

                          5                         100-125 mg/dL     PRE-DIABET

ES/FASTING

>126 mg/dL          DIABETES/FASTING



 

                          6                         CHRONIC KIDNEY DISEASE STAGI

NG PER NKF



STAGE I & II      GFR >= 60        NORMAL TO MILDLY DECREASED

STAGE III          GFR 30-59          MODERATELY DECREASED

STAGE IV           GFR 15-29         SEVERELY DECREASED

STAGE V            GFR <15            VERY LITTLE GFR LEFT

ESRD                 GFR <15            ON RRT



 

                          7                         100-125 mg/dL     PRE-DIABET

ES/FASTING

>126 mg/dL          DIABETES/FASTING



 

                          8                         CHRONIC KIDNEY DISEASE STAGI

NG PER NKF



STAGE I & II      GFR >= 60        NORMAL TO MILDLY DECREASED

STAGE III          GFR 30-59          MODERATELY DECREASED

STAGE IV           GFR 15-29         SEVERELY DECREASED

STAGE V            GFR <15            VERY LITTLE GFR LEFT

ESRD                 GFR <15            ON RRT









Procedures





                Date            Code            Description     Status

 

                2018      [id number]       Diabetic Foot Exam Completed

 

                2017      [id number]        Mammogram       Completed

 

                01/15/2016      [id number]        Mammogram       Completed

 

                2015      [id number]        Mammogram       Completed

 

                2014      [id number]        Mammogram       Completed

 

                2013      [id number]       Diabetic Retinal Eye Exam Comple

zeke

 

                2013      [id number]       Bone Mineral Density Test Comple

zeke

 

                01/10/2013      [id number]        Mammogram       Completed

 

                2012      [id number]        Mammogram       Completed

 

                06/10/2011      [id number]       Bone Mineral Density Test Comple

zeke

 

                2011      [id number]        Mammogram       Completed

 

                2009      [id number]        Mammogram       Completed

 

                2008      [id number]       Bone Mineral Density Test Comple

zeke

 

                10/22/2004      [id number]        Colonoscopy     Completed







Medical Devices





                                        Description

 

                                        No Information Available







Encounters





           Type       Date       Location   Provider   Dx         Diagnosis

 

             Office Visit 10/29/2020 11:20a [facility] Екатерина Lemus

ne, FNP 

K57.92                                  Dvtrcli of intest, part unsp, w/o perf o

r abscess w/o bleed

 

                          I50.41                    Acute combined systolic and 

diastolic (congestive) hrt fail

 

                          I13.0                     Hyp hrt & chr kdny dis w hrt

 fail and stg 1-4/unsp chr kdny

 

                          N18.31                    Chronic kidney disease, stag

e 3a

 

                          J44.9                     Chronic obstructive pulmonar

y disease, unspecified

 

                          I48.21                    Permanent atrial fibrillatio

n

 

                          Z95.2                     Presence of prosthetic heart

 valve

 

                          Z79.01                    Long term (current) use of a

nticoagulants

 

                          R26.89                    Other abnormalities of gait 

and mobility

 

             Office Visit 09/10/2020  1:40p [facility] Екатерина Lemus

ne, FNP I13.0

                                        Hyp hrt & chr kdny dis w hrt fail and st

g 1-4/unsp chr kdny

 

                          I50.42                    Chronic combined systolic an

d diastolic hrt fail

 

                          N18.3                     Chronic kidney disease, stag

e 3 (moderate)

 

                          J44.9                     Chronic obstructive pulmonar

y disease, unspecified

 

                          G30.1                     Alzheimer's disease with lat

e onset

 

                          F02.80                    Dementia in oth diseases [facility] elswhr w/o behavrl disturb

 

                          I48.21                    Permanent atrial fibrillatio

n

 

                          Z79.01                    Long term (current) use of a

nticoagulants

 

                          Z95.2                     Presence of prosthetic heart

 valve

 

                          I87.2                     Venous insufficiency (chroni

c) (peripheral)

 

             Office Visit 06/10/2020  2:00p [facility] Екатерина Menendez, [facility] I13.0

                                        Hyp hrt & chr kdny dis w hrt fail and st

g 1-4/unsp chr kdny

 

                          I50.42                    Chronic combined systolic an

d diastolic hrt fail

 

                          N18.3                     Chronic kidney disease, stag

e 3 (moderate)

 

                          J44.9                     Chronic obstructive pulmonar

y disease, unspecified

 

                          G30.1                     Alzheimer's disease with lat

e onset

 

                          F02.80                    Dementia in oth diseases [facility] elswhr w/o behavrl disturb

 

                          I48.21                    Permanent atrial fibrillatio

n

 

                          Z79.01                    Long term (current) use of a

nticoagulants

 

                          Z95.2                     Presence of prosthetic heart

 valve

 

                          Z13.89                    Encounter for screening for 

other disorder







Assessments





                Date            Code            Description     Provider

 

                    10/29/2020          K57.92              Diverticulitis of in

testine, part unspecified, without 

perforation or abscess without bleeding Екатерина Nguyen [facility]

 

                    10/29/2020          I50.41              Acute combined systo

lic (congestive) and diastolic 

(congestive) heart failure              CLARISSA Bajwa

 

                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

idney disease with heart lakshmi 

Екатерина Nguyen [facility]

 

                10/29/2020      N18.31          Chronic kidney disease, stage 3a

 DIOGO Bajwa

 

                10/29/2020      J44.9           Chronic obstructive pulmonary di

sease, unspecified Екатерина Nguyen

[facility]

 

                10/29/2020      I48.21          Permanent atrial fibrillation An

n DIOGO Nguyen

 

                10/29/2020      Z95.2           Presence of prosthetic heart zakia

ve Екатерина Nguyen [facility]

 

                10/29/2020      Z79.01          Long term (current) use of antic

oagulants CLARISSA Bajwa

 

                10/29/2020      R26.89          Other abnormalities of gait and 

mobility CLARISSA Bajwa

 

                10/15/2020      J44.9           Chronic obstructive pulmonary di

sease, unspecified Carballo F. 

Elfego,MD

 

                10/15/2020      I48.21          Permanent atrial fibrillation Co

amanda Telles MD

 

                10/15/2020      I10             Essential (primary) hypertension

 Haris Telles MD

 

                10/15/2020      G30.1           Alzheimer's disease with late on

set Haris Telles MD

 

                2020      J44.9           Chronic obstructive pulmonary di

sease, unspecified Haris Telles MD

 

                2020      G30.1           Alzheimer's disease with late on

set Haris Telles MD

 

                2020      I48.21          Permanent atrial fibrillation Co

amanda Telles MD

 

                2020      I10             Essential (primary) hypertension

 Haris Telles MD

 

                09/10/2020      I13.0           Hypertensive heart and chronic k

idney disease with heart lakshmi 

Екатерина Nguyen, [facility]

 

                    09/10/2020          I50.42              Chronic combined sys

tolic (congestive) and diastolic 

(congestive) heart failure              Екатерина Nguyen [facility]

 

                09/10/2020      N18.3           Chronic kidney disease, stage 3 

(moderate) Екатерина Nguyen [facility]

 

                09/10/2020      J44.9           Chronic obstructive pulmonary di

sease, unspecified Екатерина Nguyen

[facility]

 

                09/10/2020      G30.1           Alzheimer's disease with late on

set Екатерина Nguyen [facility]

 

                    09/10/2020          F02.80              Dementia in other di

seases classified elsewhere without 

behavioral disturbance                  Екатерина Nguyen [facility]

 

                09/10/2020      I48.21          Permanent atrial fibrillation An

marino Nguyen [facility]

 

                09/10/2020      Z79.01          Long term (current) use of antic

oagulants Екатерина Nguyen [facility]

 

                09/10/2020      Z95.2           Presence of prosthetic heart zakia

ve Екатерина Nguyen [facility]

 

                09/10/2020      I87.2           Venous insufficiency (chronic) (

peripheral) Екатерина Nguyen [facility]

 

                2020      Z11.1           Encounter for screening for resp

iratory tuberculosis Haris Telles MD

 

                2020      I48.21          Permanent atrial fibrillation An

marino Nguyen [facility]

 

                2020      I48.21          Permanent atrial fibrillation Pr

otime

 

                2020      Z51.81          Encounter for therapeutic drug l

evel monitoring Екатерина Nguyen 

[facility]

 

                2020      Z51.81          Encounter for therapeutic drug l

evel monitoring Protime

 

                2020      Z79.01          Long term (current) use of antic

oagulants Екатерина Nguyen, [facility]

 

                2020      Z79.01          Long term (current) use of antic

oagulants Protime

 

                2020      I48.21          Permanent atrial fibrillation An

n Radha Goncalves, [facility]

 

                2020      I48.21          Permanent atrial fibrillation Pr

otime

 

                2020      Z51.81          Encounter for therapeutic drug l

evel monitoring Екатерина Radha Sacha, 

[facility]

 

                2020      Z79.01          Long term (current) use of antic

oagulants Екатерина Nguyen, [facility]

 

                2020      I10             Essential (primary) hypertension

 Haris Telles MD

 

                2020      N18.3           Chronic kidney disease, stage 3 

(moderate) Haris Telles MD

 

                2020      J44.9           Chronic obstructive pulmonary di

sease, unspecified Haris Telles MD

 

                2020      G30.1           Alzheimer's disease with late on

set Haris Telles MD

 

                2020      N18.3           Chronic kidney disease, stage 3 

(moderate) Haris Telles MD

 

                2020      J44.9           Chronic obstructive pulmonary di

sease, unspecified Haris Telles MD

 

                2020      G30.1           Alzheimer's disease with late on

set Haris Telles MD

 

                2020      I10             Essential (primary) hypertension

 Haris Telles MD

 

                06/10/2020      I13.0           Hypertensive heart and chronic k

idney disease with heart lakshmi 

Екатерина Nguyen, [facility]

 

                    06/10/2020          I50.42              Chronic combined sys

tolic (congestive) and diastolic 

(congestive) heart failure              Екатерина Nguyen, [facility]

 

                06/10/2020      N18.3           Chronic kidney disease, stage 3 

(moderate) Екатерина Nguyen [facility]

 

                06/10/2020      J44.9           Chronic obstructive pulmonary di

sease, unspecified Екатерина Nguyen

[facility]

 

                06/10/2020      G30.1           Alzheimer's disease with late on

set Екатерина Nguyen, [facility]

 

                    06/10/2020          F02.80              Dementia in other di

seases classified elsewhere without 

behavioral disturbance                  Екатерина Nguyen [facility]

 

                06/10/2020      I48.21          Permanent atrial fibrillation An

n Radha Goncalves, [facility]

 

                06/10/2020      Z79.01          Long term (current) use of antic

oagulants Екатерина Radha Goncalves [facility]

 

                06/10/2020      Z95.2           Presence of prosthetic heart zakia

ve Екатерина Garcia Sacha, [facility]

 

                06/10/2020      Z13.89          Encounter for screening for othe

r disorder CLARISSA Bajwa

 

                2020      N18.3           Chronic kidney disease, stage 3 

(moderate) Haris Telles MD

 

                2020      J44.9           Chronic obstructive pulmonary di

sease, unspecified Haris Telles MD

 

                2020      I10             Essential (primary) hypertension

 Haris Telles MD

 

                2020      E66.3           Overweight      Haris srinivasan MD







Plan of Treatment

Future Appointment(s):* 2020  3:20 pm - CLARISSA Bajwa at [facility]

10/29/2020 - CLARISSA Bajwa* K57.92 Diverticulitis of intestine, part 
  unspecified, without perforation or abscess without bleeding

* I50.41 Acute combined systolic (congestive) and diastolic (congestive) heart 
  failure* New Orders:* CBC and CMP, Scheduled: 20



* Comments:* will increase Torsemide to 100mg daily.will arrange for [facility] to evaluate.





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
  * Blood pressure is controlled on current treatment plan.





* N18.31 Chronic kidney disease, stage 3a

* J44.9 Chronic obstructive pulmonary disease, unspecified* Comments:* will 
  request BMP in one week.





* I48.21 Permanent atrial fibrillation* Comments:* Rate controlled.





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

* Z79.01 Long term (current) use of anticoagulants* Comments:* INR completed at 
  home.  Has been supratherapeutic due to interaction of Warfarin and 
  Metronidazole.  Verbal  instructions given. Signs and symptoms to report re
  viewed.  No evidence of thromboembolic or bleeding events.





* R26.89 Other abnormalities of gait and mobility* New Orders:* Physical 
  Therapy, Ordered: 10/29/20



* Comments:* will request home physical therapy.





* All * Comments:* Will need MARITZA.Tried to discuss [facility] and advance 
  directives.Booklet "hard choices for loving people" given.









Functional Status





                                        Description

 

                                        No Information Available







Mental Status





                                        Description

 

                                        No Information Available







Referrals





                                        Description

 

                                        No Information Available

## 2021-01-22 NOTE — CCD
Summarization Of Episode

                             Created on: 2021



GURWINDER LAGOS

External Reference #: [id number]

: 1935

Sex: Female



Demographics





                          Address                   [address] [place] 

[place]  [address]

 

                          Home Phone                [phone]

 

                          Preferred Language        English

 

                          Marital Status            

 

                          Spiritism Affiliation     [place]

 

                          Race                      White

 

                          Ethnic Group              Not  or 





Author





                          Author                    [facility]

 

                          Organization              [facility]

 

                          Address                   Unknown

 

                          Phone                     Unavailable







Support





                Name            Relationship    Address         Phone

 

                    ROMULO LAGOS Next Of Kin         [id number] [facility]

[place]

[place] [address]                    [phone]

 

                    THEKAREN KEARNS      Next Of Kin         [address]

[place] [address]                    [phone]

 

                RE              Next Of Kin     Unknown         Unavailable

 

                    ADOLPH LAGOS    Next Of Kin         [address]

[place] [address]                    [phone]

 

                    TheKaren kearns      ECON                [id number] 

[place]  [address]                    [phone]

 

                    Pasha Lagos     ECON                [address]

[place] [address]                    [phone]







Care Team Providers





                    Care Team Member Name Role                Phone

 

                    Yon, L Charu PA Unavailable         Unavailable

 

                    Yon, L Charu PA Unavailable         Unavailable

 

                    Yon, L Charu PA Unavailable         Unavailable

 

                    Yon, L Charu PA Unavailable         Unavailable

 

                    Yon, L Charu PA Unavailable         Unavailable

 

                    Oyn, L Charu PA Unavailable         Unavailable

 

                    Yon, L Charu PA Unavailable         Unavailable

 

                    Yon, L Charu PA Unavailable         Unavailable

 

                    Yon, L Charu PA Unavailable         Unavailable

 

                    Yon, L Charu PA Unavailable         Unavailable

 

                    Yon, L Charu PA Unavailable         Unavailable

 

                    Yon, L Charu PA Unavailable         Unavailable

 

                    Yon, L Charu PA Unavailable         Unavailable

 

                    Yon, L Charu PA Unavailable         Unavailable

 

                    Yon, L Charu PA Unavailable         Unavailable

 

                    Yon, L Charu PA Unavailable         Unavailable

 

                    Yon, L Charu PA Unavailable         Unavailable

 

                    Yon, L Charu PA Unavailable         Unavailable

 

                    Yon, L Charu PA Unavailable         Unavailable

 

                    Yon, L Charu PA Unavailable         Unavailable

 

                    Yon, L Charu PA Unavailable         Unavailable

 

                    Yon, L Charu PA Unavailable         Unavailable

 

                    Yon, L Charu PA Unavailable         Unavailable

 

                    HELLEN Zuluaga RNP   Unavailable         Unavailable

 

                    HELLEN Zuluaga RNP   Unavailable         Unavailable

 

                    HELLEN Zuluaga RNP   Unavailable         Unavailable

 

                    HELLEN Zuluaga RNP   Unavailable         Unavailable

 

                    HELLEN Zuluaga RNP   Unavailable         Unavailable

 

                    LePine, M Екатерина RNP   Unavailable         Unavailable

 

                    LePine, M Екатерина RNP   Unavailable         Unavailable

 

                    LePine, M Екатерина RNP   Unavailable         Unavailable

 

                    LePine, M Екатерина RNP   Unavailable         Unavailable

 

                    LePine, M Екатерина RNP   Unavailable         Unavailable

 

                    LePine, M Екатерина RNP   Unavailable         Unavailable

 

                    LePine, M Екатерина RNP   Unavailable         Unavailable

 

                    LePine, M Екатерина RNP   Unavailable         Unavailable

 

                    LePine, M Екатерина RNP   Unavailable         Unavailable

 

                    LePine, M Екатерина RNP   Unavailable         Unavailable

 

                    LePine, M Екатерина RNP   Unavailable         Unavailable

 

                    LePine, M Екатерина RNP   Unavailable         Unavailable

 

                    LePine, M Екатерина RNP   Unavailable         Unavailable

 

                    LePine, M Екатерина RNP   Unavailable         Unavailable

 

                    LePine, M Екатерина RNP   Unavailable         Unavailable

 

                    LePine, M Екатерина RNP   Unavailable         Unavailable

 

                    LePine, M Екатерина RNP   Unavailable         Unavailable

 

                    LePine, M Екатерина RNP   Unavailable         Unavailable

 

                    LePine, M Екатерина RNP   Unavailable         Unavailable

 

                    LePine, M Екатерина RNP   Unavailable         Unavailable

 

                    LePine, M Екатерина RNP   Unavailable         Unavailable

 

                    LePine, M Екатерина RNP   Unavailable         Unavailable

 

                    LePine, M Екатерина RNP   Unavailable         Unavailable

 

                    LePine, M Екатерина RNP   Unavailable         Unavailable

 

                    LePine, M Екатерина RNP   Unavailable         Unavailable

 

                    LePine, M Екатерина RNP   Unavailable         Unavailable

 

                    LePine, M Екатерина RNP   Unavailable         Unavailable

 

                    LePine, M Екатерина RNP   Unavailable         Unavailable

 

                    LePine, M Екатерина RNP   Unavailable         Unavailable

 

                    LePine, M Екатерина RNP   Unavailable         Unavailable

 

                    LePine, M Екатерина RNP   Unavailable         Unavailable

 

                    LePine, M Екатерина RNP   Unavailable         Unavailable

 

                    LePine, M Екатерина RNP   Unavailable         Unavailable

 

                    LePine, M Екатерина RNP   Unavailable         Unavailable

 

                    LePine, M Екатерина RNP   Unavailable         Unavailable

 

                    LePine, M Екатерина RNP   Unavailable         Unavailable

 

                    LePine, M Екатерина RNP   Unavailable         Unavailable

 

                    LePine, M Екатерина RNP   Unavailable         Unavailable

 

                    LePine, M Екатерина RNP   Unavailable         Unavailable

 

                    LePine, M Екатерина RNP   Unavailable         Unavailable

 

                    LePine, M Екатерина RNP   Unavailable         Unavailable

 

                    LePine, M Екатерина RNP   Unavailable         Unavailable

 

                    LePine, M Екатерина RNP   Unavailable         Unavailable

 

                    LePine, M Екатерина RNP   Unavailable         Unavailable

 

                    LePine, M Екатерина RNP   Unavailable         Unavailable

 

                    LePine, M Екатерина RNP   Unavailable         Unavailable

 

                    LePine, M Екатерина RNP   Unavailable         Unavailable

 

                    LePine, M Екатерина RNP   Unavailable         Unavailable

 

                    LePine, M Екатерина RNP   Unavailable         Unavailable

 

                    LePine, M Екатерина RNP   Unavailable         Unavailable

 

                    THIERNO MARC MD Unavailable         Unavailable

 

                    THIERNO MARC MD Unavailable         Unavailable

 

                    THIERNO MARC MD Unavailable         Unavailable

 

                    THIERNO MARC MD Unavailable         Unavailable

 

                    THIERNO MARC MD Unavailable         Unavailable

 

                    THIERNO MARC MD Unavailable         Unavailable

 

                    THIERNO MARC MD Unavailable         Unavailable

 

                    THIERNO MARC MD Unavailable         Unavailable

 

                    TARI, MELYNNE ROGER MD Unavailable         Unavailable

 

                    TARI, MELYNNE ROGER MD Unavailable         Unavailable

 

                    TARI, MELYNNE ROGER MD Unavailable         Unavailable

 

                    TARI, MELYNNE ROGER MD Unavailable         Unavailable

 

                    TARI, MELYNNE ROGER MD Unavailable         Unavailable

 

                    TARI, MELYNNE ROGER MD Unavailable         Unavailable

 

                    TARI, MELYNNE ROGER MD Unavailable         Unavailable

 

                    TARI, MELYNNE ROGER MD Unavailable         Unavailable

 

                    TARI, MELYNNE ROGER MD Unavailable         Unavailable

 

                    TARI, MELYNNE ROGER MD Unavailable         Unavailable

 

                    TARI, MELYNNE ROGER MD Unavailable         Unavailable

 

                    TARI, MELYNNE ROGER MD Unavailable         Unavailable

 

                    TARI, MELYNNE ROGER MD Unavailable         Unavailable

 

                    TARI, MELYNNE ROGER MD Unavailable         Unavailable

 

                    TARI, MELYNNE ROGER MD Unavailable         Unavailable

 

                    TARI, MELYNNE ROGER MD Unavailable         Unavailable

 

                    TARI, MELYNNE ROGER MD Unavailable         Unavailable

 

                    TARI, MELYNNE ROGER MD Unavailable         Unavailable

 

                    TARI, MELYNNE ROGER MD Unavailable         Unavailable

 

                    TARI, MELYNNE ROGER MD Unavailable         Unavailable

 

                    TARI, MELYNNE ROGER MD Unavailable         Unavailable

 

                    TARI, MELYNNE ROGER MD Unavailable         Unavailable

 

                    TARI, MELYNNE ROGER MD Unavailable         Unavailable

 

                    TARI, MELYNNE ROGER MD Unavailable         Unavailable

 

                    TARI, MELYNNE ROGER MD Unavailable         Unavailable

 

                    TARI, MELYNNE ROGER MD Unavailable         Unavailable

 

                    TARI, MELYNNE ROGER MD Unavailable         Unavailable

 

                    TARI, MELYNNE ROGER MD Unavailable         Unavailable

 

                    TARI, MELYNNE ROGER MD Unavailable         Unavailable

 

                    TARI, MELYNNE ROGER MD Unavailable         Unavailable

 

                    TARI, MELYNNE ROGER MD Unavailable         Unavailable

 

                    TARI, MELYNNE ROGER MD Unavailable         Unavailable



                                  



Re-disclosure Warning

          The records that you are about to access may contain information from 
federally-assisted alcohol or drug abuse programs. If such information is 
present, then the following federally mandated warning applies: This information
has been disclosed to you from records protected by federal confidentiality 
rules (42 CFR part 2). The federal rules prohibit you from making any further 
disclosure of this information unless further disclosure is expressly permitted 
by the written consent of the person to whom it pertains or as otherwise 
permitted by 42 CFR part 2. A general authorization for the release of medical 
or other information is NOT sufficient for this purpose. The Federal rules 
restrict any use of the information to criminally investigate or prosecute any 
alcohol or drug abuse patient.The records that you are about to access may 
contain highly sensitive health information, the redisclosure of which is 
protected by Article 27-F of the [facility] Public Health law. If you 
continue you may have access to information: Regarding HIV / AIDS; Provided by 
facilities licensed or operated by the [facility] Office of Mental Health; 
or Provided by the [facility] Office for People With Developmental 
Disabilities. If such information is present, then the following [place] 
mandated warning applies: This information has been disclosed to you from 
confidential records which are protected by state law. State law prohibits you 
from making any further disclosure of this information without the specific 
written consent of the person to whom it pertains, or as otherwise permitted by 
law. Any unauthorized further disclosure in violation of state law may result in
a fine or [facility] sentence or both. A general authorization for the release of 
medical or other information is NOT sufficient authorization for further disc
losure.                                                                         
    



Allergies and Adverse Reactions

          



           Type       Description Substance  Reaction   Status     Data Source(s

)

 

           Penicillin Penicillin Penicillin            active     [facility] ([facility])

 

           codeine    codeine    codeine               active     [facility] ([facility])

 

           chlorpromazine chlorpromazine chlorpromazine            active     NE

[facility] ([facility])



                                                                                
                           



Family History

          



             Family Member Name Family Member Gender Family Member Status Date o

f Status 

Description                             Data Source(s)

 

           Unknown    Unknown    Problem                          [facility] ([facility]

 

           Unknown    Male       Problem                          [facility] ([facility]

 

           Unknown    Male       Problem                          [facility] ([facility])

 

           Unknown    Male       Problem                          [facility] ([facility]

 

                                        () 



                                                                                
                                     



Encounters

          



           Encounter  Providers  Location   Date       Indications Data Source(s

)

 

           Outpatient Attender: Charu [facility] Main Office 2020 01:15:0

0 PM EST            

[facility] ([facility])

 

                                            2020 12:00:00 AM EST - 

020 08:17:58 AM EST            [facility] 

([facility])

 

                Outpatient      Attender: Екатерина Siegel 10/29

/2020 11:20:00 AM EDT

                                                    [facility] ([facility]

)

 

                Outpatient      Attender: Екатерина Zuluaga LISSY Siegel 09/10

/2020 01:40:00 PM EDT

                                                    [facility] ([facility]

)

 

                Outpatient      Attender: Екатерина Zuluaga LISSY Siegel 06/10

/2020 02:00:00 PM EDT

                                                    [facility] ([facility]

)

 

                Outpatient      Attender: Екатерина Zuluaga LISSY Siegel  01:00:00 PM EST

                                                    [facility] ([facility]

)

 

           Outpatient Attender: Charu [facility] Main Office 2020 12:30:0

0 PM EST            

[facility] ([facility])

 

                Outpatient      Attender: ROGER Flores/Sarabjit/Fredo/LORRAINE campo 2020 

12:30:00 PM EST                                     [facility] ([facility]

actice, )

 

                Outpatient      Attender: Екатерина Zan Siegel  01:40:00 PM EST

                                                    [facility] ([facility]

)



                                                                                
                                                                                
      



Immunizations

          



             Vaccine      Date         Status       Description  Data Source(s)

 

                          INFLUENZA VACCINE QUADRIVALENT  (65 YR UP)/MF59

C.1/PF 10/07/2020 12:00:00

AM EDT              completed                               [facility]

 

                                        This CVX code allows reporting of a vacc

ination when formulation is unknown (for

example, when recording a Influenza vaccination when noted on a vaccination 
card)           10/05/2020 08:35:00 AM EDT completed                       [facility]

T ([facility])

 

             TB Skin test is not vaccine. 2020 10:44:00 AM EDT completed  

               [facility] 

([facility])

 

                          Influenza, injectable, MDCK, preservative free, dylan

valent 2019 02:11:00

PM EST              completed                               [facility]



                                                                                
                                     



Medications

          



          Medication Brand Name Start Date Product Form Dose      Route     Admi

nistrative 

Instructions Pharmacy Instructions Status     Indications Reaction   Description

 Data 

Source(s)

 

     Biaxin 500 MG Biaxin 2020 12:00:00 AM EST                          co

mpleted                

[facility] ([facility])

 

                    Ipratropium-Albuterol 0.5-2.5 (3) MG/3ML Ipratropium-Albuter

ol 2020 

12:00:00 AM EST                                    completed                    

  [facility] ([facility])

 

       Klor-Con M10 10 MEQ Klor-Con M10 2020 12:00:00 AM EST              

                      completed 

                                                            [facility] ([facility])

 

        Spironolactone 25 MG Spironolactone 2020 12:00:00 AM EST          

                               

completed                                                       [facility] ([facility])

 

           Clarithromycin 500 MG Oral Tablet Clarithromycin 2020 12:00:00 

AM EST                       

                              active                                  [facility] ([facility]

 

        torsemide 100 MG Oral Tablet Torsemide 2020 12:00:00 AM EST       

          ORAL                    

active                                                          [facility]

 

          Acetaminophen 325 MG Oral Tablet Acetaminophen 2020 12:00:00 AM 

EST                               

                      active                                      [facility] ([facility]

 

      Xopenex 0.63 MG/3ML Xopenex 2020 12:00:00 AM EST                    

           completed              

                                        [facility] ([facility]

)

 

      Coumadin 3 MG Coumadin 2020 12:00:00 AM EST       3.0 {mg}          

         completed        

                                                    [facility] ([facility])

 

                    Albuterol Sulfate (2.5 MG/3ML) 0.083% Albuterol Sulfate    12:00:00 AM 

EST           0 {mg}                      completed                      [facility]

T ([facility])

 

     Tylenol Tylenol 2020 12:00:00 AM EST      325.0 {mg}                c

ompleted                

[facility] ([facility])

 

        Torsemide 100 MG Torsemide 2020 12:00:00 AM EST         1.0 {table

t}                         

completed                                                       [facility] ([facility])

 

        Multivitamin Multivitamin 2020 12:00:00 AM EST         1.0 {tablet

}                         

completed                                                       [facility] ([facility])

 

        Simvastatin 40 MG Simvastatin 2020 12:00:00 AM EST         40.0 {m

g}                         

completed                                                       [facility] ([facility])

 

        Levothyroxine Levothyroxine 2020 12:00:00 AM EST         25.0 {mcg

}                         

completed                                                       [facility] ([facility])

 

      Aricept 5 MG Aricept 2020 12:00:00 AM EST       5.0 {mg}            

       completed              

                                        [facility] ([facility]

)

 

             Incruse Ellipta 62.5 MCG/INH Incruse Ellipta 2020 12:00:00 AM

 EST              62.5 

{mcg}                           completed                         [facility] ([facility])

 

      Vitamin D Vitamin D 2020 12:00:00 AM EST       125.0 {mcg}          

         completed        

                                                    [facility] ([facility])

 

             Advair Diskus 100-50 MCG/DOSE Advair Diskus 2020 12:00:00 AM 

EST              0 {mcg}

                                completed                         [facility] ([facility])

 

     Colace Colace 2020 12:00:00 AM EST      100.0 {mg}                com

pleted                

[facility] ([facility])

 

     Calcium Calcium 2020 12:00:00 AM EST      0 {mg}                compl

eted                

[facility] ([facility])

 

                    Levalbuterol 0.21 MG/ML Inhalant Solution [Xopenex] Xopenex 

            10/26/2020 12:00:00 

AM EDT                                    active                      [facility] ([facility])

 

                    Culturelle Digestive Health Probiotic Culturelle Digestive H

ealth Probiotic 

10/23/2020 12:00:00 AM EDT                                    active            

          [facility] ([facility])

 

                Metronidazole 500 MG Oral Tablet METRONIDAZOLE   10/22/2020 12:0

0:00 AM EDT tablet

                9                               TAKE ONE TABLET BY MOUTH THREE T

IMES A DAY TAKE ONE TABLET BY MOUTH THREE 

TIMES A DAY  SOLD: 10/22/2020                                        [facility]

s

 

          500 mg              10/22/2020 12:00:00 AM EDT tablet    6            

       TAKE ONE TABLET BY MOUTH TWICE A 

DAY        TAKE ONE TABLET BY MOUTH TWICE A DAY SOLD: 10/22/2020                

                  [facility]

 

          62.5 mcg/actuation           2020 12:00:00 AM EDT blister with d

evice 30                  INHALE 

ONE PUFF BY MOUTH EVERY DAY INHALE ONE PUFF BY MOUTH EVERY DAY SOLD: 2020 

   

                                                            [facility]

 

          100-50 mcg/dose           2020 12:00:00 AM EDT blister with neida

ce 60                  INHALE ONE

 PUFF BY MOUTH TWICE A DAY INHALE ONE PUFF BY MOUTH TWICE A DAY SOLD: 2020

                                                                [facility]

 

          100 mg              2020 12:00:00 AM EDT tablet    60           

       TAKE ONE-HALF TABLET BY MOUTH 

TWO TIMES A DAY     TAKE ONE-HALF TABLET BY MOUTH TWO TIMES A DAY SOLD: 20

20     

                                                            [facility]

 

          100 mg              2020 12:00:00 AM EDT tablet    60           

       TAKE ONE-HALF TABLET BY MOUTH 

TWO TIMES A DAY     TAKE ONE-HALF TABLET BY MOUTH TWO TIMES A DAY SOLD: 20     

                                                            [facility]

 

          25 mcg              2020 12:00:00 AM EDT tablet    90           

       TAKE 1 TABLET BY MOUTH ONCE 

DAILY      TAKE 1 TABLET BY MOUTH ONCE DAILY SOLD: 2020                   

               [facility]

 

          5 mg                2020 12:00:00 AM EDT tablet    90           

       TAKE ONE TABLET BY MOUTH AT 

BEDTIME    TAKE ONE TABLET BY MOUTH AT BEDTIME SOLD: 2020                 

                 [facility]

 

                    Cholecalciferol 5000 UNT Oral Capsule Vitamin D3 Ultra Stren

gth 2020 

12:00:00 AM EST               ORAL                 active                      M

EDENT ([facility])

 

                          7 ACTUAT umeclidinium 0.0625 MG/ACTUAT Dry Powder Inha

ler [Incruse] Incruse 

Ellipta 2020 12:00:00 AM EST             RESPIRATORY             active   

                MEDENT 

([facility])

 

       Administration Of Flu Vaccine        2019 12:00:00 AM EST          

                          completed  

                                                            MED[facility] ([facility])

 

                                        Medication administered onsite 



                                                                                
                                                                                
                                                                                
                                                                                
                                                                              



Insurance Providers

          



             Payer name   Policy type / Coverage type Policy ID    Covered party

 ID Covered 

party's relationship to alcaraz Policy Alcaraz             Plan Information

 

          MEDICARE            [id number]           SP                  [id number]

[id number]

 

          [facility]           [id number]           SP               

   [id number]

 

           FOR LIFE           [id number]           HU2                 [id number]

[id number]

 

          MEDICARE  C         [id number]           S                   [id number]

[id number]

 

          UMR       O         [id number]           S                   [id number]

 

           FOR LIFE O         [id number]           S                   [id number]

[id number]

 

          WPS  For Life Medigap Part B [id number]           Family Depende

nt           [id number]

 

          Medicare Natl [employer]t [employer]ic Medicare Primary [id number]           Self 

               [id number]

 

          [employer]/Umr (Old) Medigap Part B [id number]           Self               

 [id number]

 

          Umr (New [employer]) Medigap Part B [id number]           Self               

 [id number]

 

          WPS  For Life Medigap Part B [id number]           Family Depende

nt           [id number]

 

          Medicare Natl Govt Servic Medicare Primary [id number]           Self 

               [id number]

 

           For Life Commercial [id number]           Family Dependent      

     [id number]

 

          Medicare  Medicare Primary [id number]           Self                2

[id number]

 

          Umr       Commercial [id number]           Self                [id number]

 

           For Life - WPS Medigap Part B [id number]           Family Depen

dent           [id number]

 

          [employer] PHCS Ppo Medigap Part B [id number]           Self                

[id number]

 

          Umr       Medigap Part B [id number]           Self                Y19

[id number]

 

          Medicare (Part B) Medicare Primary [id number]           Self         

       [id number]

 

          [employer]     Medigap Part B [id number]           Self                11045

0109

 

           For Life - WPS Medigap Part B [id number]           Family Depen

dent           [id number]

 

          [employer] PHCS Ppo Medigap Part B [id number]           Self                

[id number]

 

          Umr       Medigap Part B [id number]           Self                Y19

[id number]

 

          Medicare (Part B) Medicare Primary [id number]           Self         

       [id number]

 

          WPS  For Life Medigap Part B [id number]           Family Depende

nt           [id number]

 

          Medicare Natl Govt Servic Medicare Primary [id number]           Self 

               [id number]

 

          [employer]     Medigap Part B [id number]           Self                05699

0109

 

          WPS  For Life Medigap Part B [id number]           Family Depende

nt           [id number]

 

          Umr       Commercial [id number]           Self                [id number]

[id number]

 

          [place] Medicare Primary [id number]           Self      

          [id number]

 

           For Life - WPS Medigap Part B [id number]           Family Depen

dent           [id number]

 

          [employer] PHCS Ppo Medigap Part B [id number]           Self                

[id number]

 

          Umr       Medigap Part B [id number]           Self                Y19

[id number]

 

          Medicare (Part B) Medicare Primary [id number]           Self         

       [id number]

 

           For Life Commercial [id number]           Family Dependent      

     [id number]

 

          Medicare  Medicare Primary [id number]           Self                2

[id number]

 

          MEDICARE            [id number]           SP                  [id number]

A

 

           For Life Commercial [id number]           Family Dependent      

     [id number]

 

          Medicare  Medicare Primary [id number]           Self                2

[id number]

 

           For Life - WPS Medigap Part B [id number]           Family Depen

dent           [id number]

 

          [employer] PHCS Ppo Medigap Part B [id number]           Self                

[id number]

 

          Umr       Medigap Part B [id number]           Self                Y19

[id number]

 

          Medicare (Part B) Medicare Primary [id number]           Self          

      [id number]

 

          [place] MED[place] PART B C         [id number]           S               

    [id number]

 

          [place] [employer] (TFL) Medigap Part B [id number]           Self     

           [id number]

 

          [employer] (pr) Medigap Part B [id number]           Self                8900

48578

 

          Umr (pr)  Medigap Part B [id number]         

  Self                

[id number]

 

          Medicare Dme Supplies Medigap Part B [id number]           Self        

        [id number]

 

          [place]are [place] Medicare Primary [id number]           Self           

     [id number]

 

          [employer]               [id number]           SP                  [id number]

 

          WPS  For Life Medigap Part B 210753452           Family Depende

nt           [id number]

 

          [place]are [facility] Medicare Primary [id number]           Self       

         [id number]

 

          [employer]     Medigap Part B [id number]           Self                51156

0109

 

          WPS  For Life Medigap Part B 015721443           Family Depende

nt           [id number]

 

          Medicare Natl Govt Servic Medicare Primary [id number]           Self  

              [id number]

 

          WPS  For Life Medigap Part B 655298630           Family Depende

nt           [id number]

 

          [employer]/Umr (Old) Medigap Part B [id number]           Self               

 [id number]

 

          Medicare Natl Govt Servic Medicare Primary [id number]           Self  

              [id number]

 

          [place] [employer] (TFL) Medigap Part B [id number]           Self     

           [id number]

 

          [employer] (pr) Medigap Part B [id number]           Self                8900

85750

 

          Umr (pr)  Medigap Part B [id number]         

  Self                

[id number]

 

          Medicare Dme Supplies Medigap Part B [id number]           Self        

        [id number]

 

          [place]are [place] Medicare Primary [id number]           Self           

     [id number]

 

           For Life - WPS Medigap Part B 827471997           Family Depen

dent           [id number]

 

          [employer] PHCS Ppo Medigap Part B [id number]           Self                

[id number]

 

          Medicare (Part B) Medicare Primary [id number]           Self          

      [id number]

 

          [place] [employer] (TFL) Medigap Part B [id number]           Self     

           [id number]

 

          [employer] (pr) Medigap Part B [id number]           Self                8900

86959

 

          Medicare Dme Supplies Medigap Part B [id number]           Self        

        [id number]

 

          [place]are [place] Medicare Primary [id number]           Self           

     [id number]

 

          [place] [employer] (TFL) Medigap Part B [id number]           Self     

           [id number]

 

          [employer] (pr) Medigap Part B [id number]           Self                8900

92490

 

          [place]are [place] Medicare Primary [id number]           Self           

     [id number]

 

          [place] [employer] (TFL) Medigap Part B [id number]           Self     

           [id number]

 

          [employer] (pr) Medigap Part B 441971059           Self                8900

70972

 

          [place]are [place] Medicare Primary [id number]           Self           

     [id number]

 

          [employer] PPO O         [id number]           S                   [id number]

 

          MEDICARE           [id number]           S                   [id number]

A

 

           For Life - WPS Medigap Part B 666803849           Family Depen

dent           [id number]

 

          [employer] PHCS Ppo Medigap Part B [id number]           Self                

[id number]

 

          Medicare (Part B) Medicare Primary [id number]           Self          

      [id number]

 

          WPS  For Life Medigap Part B 912189189           Family Depende

nt           [id number]

 

          Med[place]are [place] Medicare Primary [id number]           Self       

         [id number]

 

          WPS  For Life Medigap Part B 921146473           Family Depende

nt           [id number]

 

          Medicare Natl Govt Serv Medicare Primary [id number]           Self  

              [id number]

 

          Umr [employer] Ppo Medigap Part B [id number]           Self                8

[id number]

 

          WPS  For Life Medigap Part B 283964814           Family Depende

nt           [id number]

 

          [place]are [facility] Medicare Primary [id number]           Self       

         [id number]

 

           For Life - WPS Medigap Part B 999530250           Family Depen

dent           [id number]

 

          [employer] PHCS Ppo Medigap Part B [id number]           Self                

[id number]

 

          Medicare (Part B) Medicare Primary [id number]           Self          

      [id number]

 

          WPS  For Life Medigap Part B 642835055           Family Depende

nt           [id number]

 

          Medicare Natl Govt Servic Medicare Primary [id number]           Self  

              [id number]

 

          WPS  For Life Medigap Part B 986011386           Family Depende

nt           [id number]

 

          Medicare Natl Govt Servic Medicare Primary [id number]           Self  

              [id number]

 

          WPS  For Life Medigap Part B 134140980           Family Depende

nt           [id number]

 

          Medicare Natl Govt Servic Medicare Primary [id number]           Self  

              [id number]

 

          [employer] Ppo Medigap Part B [id number]           Self                65567

0109

 

          WPS  For Life Medigap Part B 965542717           Family Depende

nt           [id number]

 

          Medicare Natl Govt Servic Medicare Primary [id number]           Self  

              [id number]

 

          WPS  For Life Medigap Part B 914823560           Family Depende

nt           [id number]

 

          Medicare Natl Govt Servic Medicare Primary [id number]           Self  

              [id number]

 

          WPS  For Life Medigap Part B 944035896           Family Depende

nt           [id number]

 

          Medicare Natl Govt Servic Medicare Primary [id number]           Self  

              [id number]

 

          WPS  For Life Medigap Part B 113593024           Family Depende

nt           [id number]

 

          Medicare Natl Govt Servic Medicare Primary [id number]           Self  

              [id number]

 

          WPS  For Life Medigap Part B 655503580           Family Depende

nt           [id number]

 

          Medicare Natl Govt Servic Medicare Primary [id number]           Self  

              [id number]

 

          WPS  For Life Medigap Part B 953215339           Family Depende

nt           [id number]

 

          Medicare Natl Govt Servic Medicare Primary [id number]           Self  

              [id number]

 

          WPS  For Life Medigap Part B 211418541           Family Depende

nt           [id number]

 

          Medicare Natl Govt Servic Medicare Primary [id number]           Self  

              [id number]

 

          WPS  For Life Medigap Part B 831582302           Family Depende

nt           [phone]

 

          Medicare [facility] Medicare Primary [id number]           Self  

              [id number]

 

          WPS  For Life Medigap Part B 632058177           Family Depende

nt           [id number]

 

          Medicare Natl Govt Serv Medicare Primary [id number]           Self  

              [id number]

 

          WPS  For Life Medigap Part B 407864146           Family Depende

nt           [id number]

 

          Medicare Natl Govt Servic Medicare Primary [id number]           Self  

              [id number]

 

          WPS  For Life Medigap Part B 603605169           Family Depende

nt           [id number]

 

          Medicare Natl Govt Serv Medicare Primary [id number]           Self  

              [id number]

 

          WPS  For Life Medigap Part B 288278038           Family Depende

nt           [id number]

 

          Medicare Natl Govt Serv Medicare Primary [id number]           Self  

              [id number]

 

          WPS  For Life Medigap Part B 422625694           Family Depende

nt           [id number]

 

          Medicare Natl Govt Servic Medicare Primary [id number]           Self  

              [id number]

 

          WPS  For Life Medigap Part B 029244198           Family Depende

nt           [id number]

 

          Medicare Natl [facility] Medicare Primary [id number]           Self  

              [id number]

 

          WPS  For Life Medigap Part B                     Family Depende

nt            

 

          [employer] Ppo Medigap Part B 910                 Self                910

 

          Medicare Natl Govt Serv Medicare Primary                     Self   

              

 

           FOR LIFE           [id number]           2                 [id number]

[id number]

 

                              [id number]                               [id number]

A

 

                              [id number]                               [id number]

 

                              [id number]                               [id number]



                                                                                
                                                                                
                                                                                
                                                                                
                                                                                
                                                                                
                                                                                
                                                                                
                                                                                
                                                                                
                                                                                
                                                                                
                                                                                
                                                                                
                                                                                
                                                                                
                                                                                
                                                



Problems, Conditions, and Diagnoses

          



           Code       Display Name Description Problem Type Effective Dates Data

 Source(s)

 

                          K57.92                    Diverticulitis of intestine,

 part unspecified, without perforation or 

abscess without bleeding                Diverticulitis of intestine, part unspec

ified, without 

perforation or abscess without bleeding Problem             10/20/2020 01:00:00 

AM RAJESH JOHNSON ([facility])

 

                          I13.0                     Hypertensive heart and chron

ic kidney disease with heart failure and stage

 1 through stage 4 chronic kidney disease, or unspecified chronic kidney disease
                                        Hypertensive heart and chronic kidney di

sease with heart failure and stage 1 

through stage 4 chronic kidney disease, or unspecified chronic kidney disease 

Problem                   10/20/2020 01:00:00 AM EDT [facility] ([facility])

 

                          I50.41                    Acute combined systolic (con

gestive) and diastolic (congestive) heart 

failure                                 Acute combined systolic (congestive) and

 diastolic (congestive) heart 

failure             Problem             10/20/2020 01:00:00 AM EDT [facility] ([facility])

 

                N18.31          Chronic kidney disease, stage 3a Chronic kidney 

disease, stage 3a Problem

                          10/20/2020 01:00:00 AM EDT [facility] ([facility])

 

             G30.9        Alzheimer's disease, unspecified Alzheimer's disease, 

unspecified Problem      

10/20/2020 01:00:00 AM EDT              [facility] ([facility]

)

 

                          F02.80                    Dementia in other diseases c

lassified elsewhere without behavioral 

disturbance                             Dementia in other diseases classified el

sewhere without behavioral 

disturbance         Problem             10/20/2020 01:00:00 AM EDT [facility] ([facility])

 

             I48.21       Permanent atrial fibrillation Permanent atrial fibrill

ation Problem      

10/20/2020 01:00:00 AM EDT              [facility] ([facility]

)

 

                    M19.90              Unspecified osteoarthritis, unspecified 

site Unspecified osteoarthritis, 

unspecified site    Problem             10/20/2020 01:00:00 AM EDT [facility] ([facility])

 

                    M81.0               Age-related osteoporosis without current

 pathological fracture Age-related

 osteoporosis without current pathological fracture Problem                   10

/ 01:00:00 

AM EDT                                  [facility] ([facility]

)

 

                    Z51.81              Encounter for therapeutic drug level mon

itoring Encounter for therapeutic

 drug level monitoring Problem             10/20/2020 01:00:00 AM EDT [facility] (

[facility])

 

                    Z79.01              Long term (current) use of anticoagulant

s Long term (current) use of 

anticoagulants      Problem             10/20/2020 01:00:00 AM EDT [facility] ([facility])

 

                    Z79.51              Long term (current) use of inhaled stero

ids Long term (current) use of 

inhaled steroids    Problem             10/20/2020 01:00:00 AM EDT [facility] ([facility])

 

           Z91.81     History of falling History of falling Problem    10/20/202

0 01:00:00 AM EDT 

[facility] ([facility])

 

             Z95.0        Presence of cardiac pacemaker Presence of cardiac pace

maker Problem      

10/20/2020 01:00:00 AM EDT              [facility] ([facility]

)

 

                Z95.2           Presence of prosthetic heart valve Presence of p

rosthetic heart valve 

Problem                   10/20/2020 01:00:00 AM EDT [facility] ([facility])

 

             G93.40       Encephalopathy, unspecified Encephalopathy, unspecifie

d Problem      

10/20/2020 01:00:00 AM EDT              [facility] ([facility]

)

 

                    J44.9               Chronic obstructive pulmonary disease, u

nspecified Chronic obstructive 

pulmonary disease, unspecified Problem             10/20/2020 01:00:00 AM EDT NE

[facility] 

([facility])

 

                    J44.1               Chronic obstructive pulmonary disease wi

th (acute) exacerbation Chronic 

obstructive pulmonary disease with (acute) exacerbation Problem                 

  10/03/2020 

01:00:00 AM EDT                         [facility] ([facility]

)



                                                                                
                                                                                
                                                                                
                            



Surgeries/Procedures

          



             Procedure    Description  Date         Indications  Data Source(s)

 

             ECG ROUTINE ECG W/LEAST 12 LDS W/I&R              2020 12:00:

00 AM EST              [facility] 

([facility])

 

             ECG ROUTINE ECG W/LEAST 12 LDS W/I&R              2020 12:00:

00 AM EST              [facility] 

([facility])



                                                                                
                  



Results

          



                    ID                  Date                Data Source

 

                    [id number]                 2021 12:00:00 AM EST [facility]









          Name      Value     Range     Interpretation Code Description Data Cinthya

rce(s) Supporting 

Document(s)

 

          SARS-CoV2 Rapid Antigen Negative                                [facility]

     

 

                                        This lab was ordered by [facility]

[facility] and reported by [facility]. 









                    ID                  Date                Data Source

 

                    [id number]          2020 07:37:00 AM EST [facility] ([facility])









          Name      Value     Range     Interpretation Code Description Data Cinthya

rce(s) Supporting 

Document(s)

 

           Laboratory test finding (navigational concept) 0.01 ng/mL 0.00-0.08  

                      [facility] 

([facility])                   









                    ID                  Date                Data Source

 

                    [id number]          2020 07:35:00 AM EST [facility] ([facility])









          Name      Value     Range     Interpretation Code Description Data Cinthya

rce(s) Supporting 

Document(s)

 

           Laboratory test finding (navigational concept) 40.0 %     38.0-51.0  

                      [facility] 

([facility])                   

 

           Laboratory test finding (navigational concept) 89 mg/dL        

                      [facility] 

([facility])                   

 

           Laboratory test finding (navigational concept) 138 meq/L  136-145    

                      [facility] 

([facility])                   

 

           Laboratory test finding (navigational concept) 3.7 meq/L  3.5-5.1    

                      [facility] 

([facility])                   

 

           Laboratory test finding (navigational concept) 4.6 mg/dL  4.5-5.3    

                      [facility] 

([facility])                   

 

           Laboratory test finding (navigational concept) 98 meq/L        

                      [facility] 

([facility])                   

 

           Laboratory test finding (navigational concept) 32.0 MM/L  23.0-27.0  

                      [facility] 

([facility])                   

 

           Laboratory test finding (navigational concept) 1.1 mg/dL  0.6-1.3    

                      [facility] 

([facility])                   

 

           Laboratory test finding (navigational concept) 26 mg/dL   8-26       

                      [facility] 

([facility])                   









                    ID                  Date                Data Source

 

                    [id number]          2020 07:19:00 AM EST [facility] ([facility])









          Name      Value     Range     Interpretation Code Description Data Cinthya

rce(s) Supporting 

Document(s)

 

          Bedside Glucose 83 mg/dL                          [facility] ([facility])  









                    ID                  Date                Data Source

 

                    [id number]          2020 07:15:00 AM EST [facility] ([facility])









          Name      Value     Range     Interpretation Code Description Data Cinthya

rce(s) Supporting 

Document(s)

 

          Inr       2.38                                    [facility]  

 

                                        THERAPUTIC HUMAN INR VALUES

INDICATIONS                      NORMAL RANGES

PROPHYLAXIS/TREATMENT OF:

VENOUS THROMBOSIS                2.0-3.0

PULMONARY EMBOLISM               2.0-3.0

PREVENTION OF SYSTEMIC EMBOLISM FROM:

TISSUE HEART VALVES              2.0-3.0

ACUTE MYOCARDIAL INFARCTION      2.0-3.0

VALVULAR HEART DISEASE           2.0-3.0

ATRIAL FIBRILLATION              2.0-3.0

MECHANICAL VALVES(HIGH RISK)     2.5-3.5

RECURRENT MYOCARDIAL INFARCTION  2.5-3.5

 

 

          Prothrombin Time 26.5 s    12.5-14.3                     [facility] ([facility])  









                    ID                  Date                Data Source

 

                    [id number]          2020 07:15:00 AM EST [facility] ([facility])









          Name      Value     Range     Interpretation Code Description Data Cinthya

rce(s) Supporting 

Document(s)

 

                          Thyrotropin [Units/volume] in Serum or Plasma by Detec

tion limit <= 0.05 mIU/L 

5.120 uIU/ML 0.358-3.740                            [facility] ([facility]

)  









                    ID                  Date                Data Source

 

                    [id number]          2020 07:15:00 AM EST [facility] ([facility])









          Name      Value     Range     Interpretation Code Description Data Cinthya

rce(s) Supporting 

Document(s)

 

          White Blood Count 8.5 10    4.0-10.0                      [facility] ([facility])  

 

          Red Blood Count 4.35 10   4.00-5.40                     [facility] ([facility])  

 

          Hemoglobin 13.0 g/dL 12.0-15.5                     [facility] ([facility])  

 

          Hematocrit 40.6 %    36.0-47.0                     [facility] ([facility])  

 

          Mean Corpuscular Hemoglobin 29.9 pg   27.0-33.0                     ME

DENT ([facility]) 

 

 

          Mean Corpuscular Volume 93.3 fl   80.0-96.0                     [facility]

 ([facility])  

 

          Platelet Count, Automated 194 10    150-450                       MED[facility]

NT ([facility])  

 

          Red Cell Distribution Width 15.2 %    11.5-14.5                     ME

DENT ([facility])  

 

          Mean Corpuscular HGB Conc 32.0 g/dL 32.0-36.5                     [facility]

NT ([facility]) 

 

 

          Mono %    18.0 %    0.0-5.0                       [facility]  

 

          Neutrophils % 65.5 %    36.0-66.0                     [facility] ([facility])  

 

          Lymph %   12.8 %    24.0-44.0                     [facility]  

 

          Eos %     2.2 %     0.0-3.0                       [facility] [facility]  

 

          Immature Granulocyte % 0.4 %     0-3.0                         [facility] 

([facility])  

 

          Nucleated Red Blood Cell % 0.0 %     0-0                           MED

ENT ([facility])  

 

          Baso %    1.1 %     0.0-1.0                       [facility] [facility]

[facility])  

 

          Mono #    1.5 10    0.0-0.8                       [facility] [facility]

[facility])  

 

          Lymph #   1.1 10    1.5-5.0                       [facility] [facility]

[facility]  

 

          Neutrophils # 5.5 10    1.5-8.5                       [facility] ([facility])  

 

          Eos #     0.2 10    0.0-0.5                       [facility] [facility]

[facility])  

 

          Baso #    0.1 10    0.0-0.2                       [facility] [facility]

[facility])  









                    ID                  Date                Data Source

 

                    [id number]          2020 02:30:00 PM EST [facility] ([facility])









          Name      Value     Range     Interpretation Code Description Data Cinthya

rce(s) Supporting 

Document(s)

 

          Glucose, Fasting 99 mg/dL                          [facility] ([facility])  

 

          Creatinine For GFR 1.07 mg/dL 0.55-1.30                     [facility] ([facility])  

 

          Blood Urea Nitrogen 21 mg/dL  7-18                          [facility] ([facility])  

 

          Glomerular Filtration Rate 52.1                                    MED

ENT ([facility])  

 

                                        <content>Units are mL/min/1.73 

m2</content><br/><content></content><br/><content>Chronic Kidney Disease Staging
 per NKF:</content><br/><content></content><br/><content>Stage I & II   GFR >=60
       Normal to Mildly Decreased</content><br/><content>Stage III      GFR 30-
59      Moderately Decreased</content><br/><content>Stage IV       GFR 15-29    
  Severely Decreased</content><br/><content>Stage V        GFR <15        Very 
Little GFR Left</content><br/><content>ESRD           GFR <15 on 
RRT</content><br/><content></content> 

 

          Sodium Level 136 meq/L 136-145                       [facility] ([facility])  

 

          Potassium Serum 4.2 meq/L 3.5-5.1                       [facility] ([facility])  

 

          Chloride Level 95 meq/L                          [facility] ([facility])  

 

          Carbon Dioxide Level 35 meq/L  21-32                         [facility] ([facility])  

 

          Anion Gap 6 meq/L   8-16                          [facility] [facility]

[facility])  

 

          Calcium Level 10.1 mg/dL 8.8-10.2                      [facility] ([facility])  

 

          Ast/Sgot  32 U/L    7-37                          [facility] [facility]

[facility])  

 

          Alt/SGPT  23 U/L    12-78                         [facility] [facility]

[facility])  

 

          Alkaline Phosphatase 66 U/L                            [facility] ([facility])  

 

          Total Protein 6.6 GM/DL 6.4-8.2                       [facility] ([facility])  

 

          Bilirubin,Total 0.7 mg/dL 0.2-1.0                       [facility] ([facility])  

 

          Albumin/Globulin Ratio 1.3       1.2-2.2                       [facility] 

([facility])  

 

          Albumin   3.7 GM/DL 3.2-5.2                       [facility] [facility]

[facility])  









                    ID                  Date                Data Source

 

                    [id number]          2020 02:30:00 PM EST [facility] ([facility])









          Name      Value     Range     Interpretation Code Description Data Cinthya

rce(s) Supporting 

Document(s)

 

          Hemoglobin 13.3 g/dL 12.0-15.5                     [facility]  

 

          White Blood Count 7.8 10    4.0-10.0                      [facility] ([facility])  

 

          Red Blood Count 4.46 10   4.00-5.40                     [facility] ([facility])  

 

          Mean Corpuscular Volume 93.3 fl   80.0-96.0                     [facility]

 ([facility])  

 

          Mean Corpuscular Hemoglobin 29.8 pg   27.0-33.0                     ME

DENT ([facility]) 

 

 

          Hematocrit 41.6 %    36.0-47.0                     [facility] [facility]

[facility])  

 

          Platelet Count, Automated 231 10    150-450                       [facility]

NT ([facility])  

 

          Mean Corpuscular HGB Conc 32.0 g/dL 32.0-36.5                     [facility]

NT ([facility]) 

 

 

          Red Cell Distribution Width 15.9 %    11.5-14.5                     ME

DENT ([facility])  

 

          Nucleated Red Blood Cell % 0.0 %     0-0                           MED

ENT ([facility])  









                    ID                  Date                Data Source

 

                    [id number]          10/20/2020 09:34:00 AM EDT [facility] ([facility])









          Name      Value     Range     Interpretation Code Description Data Cinthya

rce(s) Supporting 

Document(s)

 

           Laboratory test finding (navigational concept) 0.02 ng/mL 0.00-0.08  

                      [facility] 

([facility])                   









                    ID                  Date                Data Source

 

                    [id number]          10/20/2020 09:29:00 AM EDT [facility] ([facility])









          Name      Value     Range     Interpretation Code Description Data Cinthya

rce(s) Supporting 

Document(s)

 

           Laboratory test finding (navigational concept) 0.90       0.4-2.0    

                      [facility] 

([facility])                   









                    ID                  Date                Data Source

 

                    [id number]          10/20/2020 09:28:00 AM EDT [facility]









          Name      Value     Range     Interpretation Code Description Data Cinthya

rce(s) Supporting 

Document(s)

 

           Laboratory test finding (navigational concept) 41.0 %     38.0-51.0  

                      [facility] 

([facility])                   

 

           Laboratory test finding (navigational concept) 136 meq/L  136-145    

                      [facility] 

([facility])                   

 

           Laboratory test finding (navigational concept) 3.9 meq/L  3.5-5.1    

                      [facility] 

([facility])                   

 

           Laboratory test finding (navigational concept) 107 mg/dL       

                      [facility] 

([facility])                   

 

           Laboratory test finding (navigational concept) 95 meq/L        

                      [facility] 

([facility])                   

 

           Laboratory test finding (navigational concept) 30.0 MM/L  23.0-27.0  

                      [facility] 

([facility])                   

 

           Laboratory test finding (navigational concept) 5.1 mg/dL  4.5-5.3    

                      [facility] 

([facility])                   

 

           Laboratory test finding (navigational concept) 30 mg/dL   8-26       

                      [facility] 

([facility])                   

 

           Laboratory test finding (navigational concept) 1.2 mg/dL  0.6-1.3    

                      [facility] 

([facility])                   









                    ID                  Date                Data Source

 

                    [id number]          10/20/2020 09:28:00 AM EDT [facility] ([facility])









          Name      Value     Range     Interpretation Code Description Data Cinthya

rce(s) Supporting 

Document(s)

 

           Laboratory test finding (navigational concept) 31.3 s     12.1-14.4  

                      [facility] 

([facility])                   

 

           Laboratory test finding (navigational concept) 2.7                   

                      [facility] ([facility])                              









                    ID                  Date                Data Source

 

                    [id number]          10/20/2020 08:53:00 AM EDT [facility] ([facility])









          Name      Value     Range     Interpretation Code Description Data Cinthya

rce(s) Supporting 

Document(s)

 

          White Blood Count 14.2 10   4.0-10.0                      [facility] ([facility])  

 

          Red Blood Count 4.35 10   4.00-5.40                     [facility] ([facility])  

 

          Mean Corpuscular Volume 92.4 fl   80.0-96.0                     [facility]

 ([facility])  

 

          Hemoglobin 13.1 g/dL 12.0-15.5                     [facility] [facility]  

 

          Hematocrit 40.2 %    36.0-47.0                     [facility] [facility]  

 

          Mean Corpuscular Hemoglobin 30.1 pg   27.0-33.0                     ME

DENT ([facility]) 

 

 

          Mean Corpuscular HGB Conc 32.6 g/dL 32.0-36.5                     [facility]

NT ([facility]) 

 

 

          Red Cell Distribution Width 15.3 %    11.5-14.5                     ME

DENT ([facility])  

 

          Lymph %   5.6 %     24.0-44.0                     [facility] [facility]  

 

          Neutrophils % 75.0 %    36.0-66.0                     [facility] ([facility])  

 

          Platelet Count, Automated 178 10    150-450                       [facility]

NT ([facility])  

 

          Mono %    17.9 %    0.0-5.0                       [facility] [facility]  

 

          Baso %    0.4 %     0.0-1.0                       [facility] [facility]  

 

          Eos %     0.5 %     0.0-3.0                       [facility] [facility]

[facility])  

 

          Nucleated Red Blood Cell % 0.0 %     0-0                           MED

ENT ([facility])  

 

          Immature Granulocyte % 0.6 %     0-3.0                         [facility] 

([facility])  

 

          Neutrophils # 10.7 10   1.5-8.5                       [facility] ([facility])  

 

          Lymph #   0.8 10    1.5-5.0                       [facility] [facility]

[facility])  

 

          Eos #     0.1 10    0.0-0.5                       [facility] [facility]

[facility])  

 

          Mono #    2.5 10    0.0-0.8                       [facility] [facility]

[facility])  

 

          Baso #    0.1 10    0.0-0.2                       [facility] [facility]

[facility])  









                    ID                  Date                Data Source

 

                    [id number]          10/20/2020 08:53:00 AM EDT [facility] ([facility])









          Name      Value     Range     Interpretation Code Description Data Cinthya

rce(s) Supporting 

Document(s)

 

           Ammonia [Mass/volume] in Blood Laboratory test result                

                  [facility]                              









                    ID                  Date                Data Source

 

                    [id number]          10/20/2020 08:53:00 AM EDT [facility] ([facility])









          Name      Value     Range     Interpretation Code Description Data Cinthya

rce(s) Supporting 

Document(s)

 

           Appearance, Urine RFX Laboratory test result                         

         [facility] ([facility])

                                         

 

           Specific Gravity Ur Auto RFX 1.012      1.002-1.035                  

     [facility]                              

 

          Color, Urine RFX Laboratory test result                               

[facility] ([facility]  

 

          PH,Urine RFX 6.0 units 5.0-9.0                       [facility]

 [facility]  

 

           Glucose, Urine (Ua) Auto RFX Laboratory test result                  

                [facility] ([facility]                              

 

           Protein, Urine Auto RFX Laboratory test result                       

           [facility] 

[facility]                              

 

           Ketone, Urine Auto RFX Laboratory test result                        

          [facility] 

[facility]                              

 

           Urobilinogen, Urine Auto RFX 0.2 mg/dL  0.0-2.0                      

    [facility]

                                         

 

           Bilirubin, Urine Auto RFX Laboratory test result                     

             [facility] ([facility]                              

 

           Nitrite, Urine Auto RFX Laboratory test result                       

           [facility] 

[facility]                              

 

          WBC, Urine Auto RFX 1 /HPF    0-3                           [facility]  

 

           Leukocyte Esterase Ur Auto RFX Laboratory test result                

                  [facility] ([facility])                              

 

           Blood, Urine Blood RFX Laboratory test result                        

          [facility] ([facility])                              

 

          Squam Epithelial Cell Ur Aurfx 0 /HPF    0-6                          

 [facility]  

 

          RBC, Urine Auto RFX 1 /HPF    0-3                           [facility]  

 

           Bacteria, Urine Auto RFX Laboratory test result                      

            [facility] ([facility])                              

 

          Hyaline Cast, Urine Auto RFX 0 /LPF    0-1                           [facility]  









                    ID                  Date                Data Source

 

                    [id number]          10/20/2020 08:53:00 AM EDT [facility] ([facility])









          Name      Value     Range     Interpretation Code Description Data Cinthya

rce(s) Supporting 

Document(s)

 

          CPK Creatine Phosphokinase 62 U/L                            MED

ENT ([facility])  

 

          Troponin I Laboratory test result                               [facility]

 ([facility])  

 

                                        <content>Troponin I Reference Interval f

or Siemens Warren 

LOCI:</content><br/><content></content><br/><content>99th Percentile= 0.00-0.045
 ng/ml</content><br/><content></content><br/><content>Risk 
Stratification:</content><br/><content><= 0.10 ng/ml   Decreased Risk for 
Adverse Clinical</content><br/><content>Events.</content><br/><content>0.10-1.50
 ng/ml   Increased Risk for Adverse Clinical</content><br/><content>Events. 
Evaluation of additional</content><br/><content>criterion and/or repeat testing 
in 2-6</content><br/><content>hours is suggested to rule out 
myocardial</content><br/><content>damage.</content><br/><content>>= 1.50 ng/ml  
 Indicative of Myocardial Injury.</content><br/><content></content> 

 

          MB/CK Relative Index 1.61                                    [facility]  

 

                                        <content>DIAGNOSIS CRITERIA</content><br

/><content>MMB ng/ml       Relative 

Index (RI)</content><br/><content>NON-AMI               < or = 5               
N/A</content><br/><content>GRAY ZONE              > 5                < or = 
4</content><br/><content>AMI                    > 5                   > 
4</content><br/><content></content> 

 

          CK-MB Value Mass Laboratory test result                               

[facility] ([facility])  









                    ID                  Date                Data Source

 

                    [id number]          10/20/2020 08:53:00 AM EDT [facility]









          Name      Value     Range     Interpretation Code Description Data Cinthya

rce(s) Supporting 

Document(s)

 

          Ast/Sgot  23 U/L    7-37                          [facility] ([facility])  

 

          Alkaline Phosphatase 72 U/L                            [facility]  

 

          Alt/SGPT  24 U/L    12-78                         [facility] ([facility])  

 

          Bilirubin,Total 1.2 mg/dL 0.2-1.0                       [facility] ([facility]  

 

          Albumin   3.7 GM/DL 3.2-5.2                       [facility]  

 

          Total Protein 6.7 GM/DL 6.4-8.2                       [facility]  

 

          Bilirubin,Direct 0.4 mg/dL 0.0-0.2                       [facility]

[place] [facility]  

 

          Albumin/Globulin Ratio 1.2       1.2-2.2                       [facility] 

([place] [facility]  









                    ID                  Date                Data Source

 

                    [id number]          10/20/2020 08:53:00 [facility] [facility]









          Name      Value     Range     Interpretation Code Description Data Cinthya

rce(s) Supporting 

Document(s)

 

                    Lipoprotein lipase [Enzymatic activity/volume] in Serum or P

lasma 157 U/L             

                                                [facility]  

 

                          Thyrotropin [Units/volume] in Serum or Plasma by Detec

tion limit <= 0.05 mIU/L 

3.480 uIU/ML 0.358-3.740                            [facility] ([facility]

)  









                    ID                  Date                Data Source

 

                    [id number]          10/20/2020 08:53:00 AM EDT [facility] ([facility])









          Name      Value     Range     Interpretation Code Description Data Cinthya

rce(s) Supporting 

Document(s)

 

          Blood Type Laboratory test result                               [facility]

 ([facility])  

 

           AB Screen (Indirect Anatoly)Vis Laboratory test result                

                  [facility] ([facility])                              









                    ID                  Date                Data Source

 

                    [id number]            09/10/2020 01:37:00 PM EDT [facility] ([facility])









          Name      Value     Range     Interpretation Code Description Data Cinthya

rce(s) Supporting 

Document(s)

 

           Leukocytes [#/volume] in Blood by Automated count 7.4 x10*3/UL 4.1-10

.9                         

[facility] ([facility])    

 

           Erythrocytes [#/volume] in Blood by Automated count 5.02 x10*6/UL 4.2

0-6.30                        

[facility] ([facility])    

 

           Hemoglobin [Mass/volume] in Blood 14.8 g/dL  12.0-18.0               

         [facility] ([facility])                       

 

           Hematocrit [Volume Fraction] of Blood by Automated count 44.4 %     3

7.0-51.0                        

[facility] ([facility])    

 

          MCV       88.5 fL   80.0-97.0                     [facility] ([facility]  

 

          MCH       29.4 pg   26.0-32.0                     [facility] ([facility]  

 

          MCHC      33.3 g/dL 31.0-38.0                     [facility] ([facility]  

 

           Erythrocyte distribution width [Ratio] by Automated count 14.3 %     

11.6-13.7                        

[facility] ([facility])    

 

           Platelets [#/volume] in Blood by Automated count 208 x10*3/-440

                          [facility]

 ([facility])          

 

             Platelet mean volume [Entitic volume] in Blood by Dioni 8.8 FL

       7.8-11.0                   

                          [facility] ([facility])  

 

           Lymphocytes/100 leukocytes in Blood by Automated count 16.7 %     10.

0-58.5                        

[facility] ([facility])    

 

          Mid %     4.6 %     1.7-9.3                       [facility] ([facility]  

 

          Neut %    78.7 %    37.0-92.0                     [facility] ([facility]  

 

          Mid #     0.4 x10*3/UL 0.1-0.6                       [facility] ([facility])  

 

          Lymph #   1.2 x10*3/UL 0.6-4.1                       [facility] ([facility])  

 

           Neutrophils [#/volume] in Semen by Manual count 5.8 x10*3/UL 2.0-7.8 

                         [facility] 

([facility])           









                    ID                  Date                Data Source

 

                    [id number]          09/10/2020 01:37:00 PM EDT [facility] ([facility])









          Name      Value     Range     Interpretation Code Description Data Cinthya

rce(s) Supporting 

Document(s)

 

                          Thyrotropin [Units/volume] in Serum or Plasma by Detec

tion limit <= 0.05 mIU/L 

3.19 uIU/mL  0.36-3.74                              [facility] ([facility]

)  









                    ID                  Date                Data Source

 

                    [id number]          09/10/2020 01:37:00 PM EDT [facility] ([facility])









          Name      Value     Range     Interpretation Code Description Data Cinthya

rce(s) Supporting 

Document(s)

 

           Urea nitrogen [Mass/volume] in Serum or Plasma 24 mg/dL   7-18       

                      [facility] 

([facility])                   

 

           Glucose [Mass/volume] in Serum or Plasma 100 mg/dL  74-99            

                [facility] ([facility])                              

 

                                        100-125 mg/dL     PRE-DIABETES/FASTING

>126 mg/dL          DIABETES/FASTING

 

 

           Sodium [Moles/volume] in Serum or Plasma 139 meq/L  136-145          

                [facility] ([facility])                             

 

           Chloride [Moles/volume] in Serum or Plasma 99 meq/L            

                  [facility] ([facility])                             

 

           Potassium [Moles/volume] in Serum or Plasma 4.3 meq/L  3.5-5.1       

                   [facility] 

([facility])                   

 

          Creatinine 1.4 mg/dL 0.6-1.3                       [facility]  

 

           Carbon dioxide, total [Moles/volume] in Serum or Plasma 36 meq/L   21

-32                            

[facility] ([facility])            

 

                                        Glomerular filtration rate/1.73 sq M pre

dicted among non-blacks [Volume 

Rate/Area] in Serum or Plasma by Creatinine-based formula (MDRD) 36 mL/min      

                                  

                          [facility] ([facility])  

 

           Calcium [Mass/volume] in Serum or Plasma 9.9 mg/dL  8.5-10.1         

                [facility] 

([facility])                   

 

                                        Glomerular filtration rate/1.73 sq M pre

dicted among blacks [Volume Rate/Area] 

in Serum or Plasma by Creatinine-based formula (MDRD) 44 mL/min                 

                          [facility] 

([facility])                   

 

                                        <content>CHRONIC KIDNEY DISEASE STAGING 

PER 

NKF</content><br/><content></content><br/><content>STAGE I & II      GFR >= 60  
     NORMAL TO MILDLY DECREASED</content><br/><content>STAGE III          GFR 
30-59          MODERATELY DECREASED</content><br/><content>STAGE IV           
GFR 15-29         SEVERELY DECREASED</content><br/><content>STAGE V            
GFR <15            VERY LITTLE GFR LEFT</content><br/><content>ESRD             
   GFR <15            ON RRT</content><br/><content></content> 









                    ID                  Date                Data Source

 

                    [id number]          09/10/2020 01:37:00 PM EDT [facility] ([facility])









          Name      Value     Range     Interpretation Code Description Data Cinthya

rce(s) Supporting 

Document(s)

 

           Hemoglobin [Mass/volume] in Blood 14.8 g/dL  12.0-18.0               

         [facility] ([facility]                              

 

           Erythrocytes [#/volume] in Blood by Automated count 5.02 x10*6/UL 4.2

0-6.30                        

[facility] ([facility])            

 

           Leukocytes [#/volume] in Blood by Automated count 7.4 x10*3/UL 4.1-10

.9                         

[facility] ([facility])            

 

          MCV       88.5 fL   80.0-97.0                     [facility] ([facility])  

 

           Hematocrit [Volume Fraction] of Blood by Automated count 44.4 %     3

7.0-51.0                        

[facility] ([facility])            

 

          MCH       29.4 pg   26.0-32.0                     [facility] ([facility])  

 

          MCHC      33.3 g/dL 31.0-38.0                     [facility]  

 

           Erythrocyte distribution width [Ratio] by Automated count 14.3 %     

11.6-13.7                        

[facility] ([facility])            

 

           Platelets [#/volume] in Blood by Automated count 208 x10*3/-440

                          [facility]

 ([facility])                  

 

          Lymph %   16.7 %    10.0-58.5                     [facility] [facility])  

 

          MPV       8.8 FL    7.8-11.0                      [facility] [facility])  

 

          Mid %     4.6 %     1.7-9.3                       [facility] [facility])  

 

          Neut %    78.7 %    37.0-92.0                     [facility] ([facility])  

 

          Lymph #   1.2 x10*3/UL 0.6-4.1                       [facility] ([facility])  

 

          Mid #     0.4 x10*3/UL 0.1-0.6                       [facility] ([facility])  

 

          Neut #    5.8 x10*3/UL 2.0-7.8                       [facility] ([facility])  









                    ID                  Date                Data Source

 

                    [id number]          2020 10:56:00 AM EDT [facility] ([facility])









          Name      Value     Range     Interpretation Code Description Data Cinthya

rce(s) Supporting 

Document(s)

 

           INR in Platelet poor plasma by Coagulation assay 3.1                 

                        [facility] ([facility])                              









                    ID                  Date                Data Source

 

                    [id number]          2020 04:04:00 PM EDT [facility] ([facility])









          Name      Value     Range     Interpretation Code Description Data Cinthya

rce(s) Supporting 

Document(s)

 

           INR in Platelet poor plasma by Coagulation assay 2.5                 

                        [facility] ([facility])                              









                    ID                  Date                Data Source

 

                    [id number]          06/10/2020 01:38:00 PM EDT [facility] ([facility])









          Name      Value     Range     Interpretation Code Description Data Cinthya

rce(s) Supporting 

Document(s)

 

          Magnesium 2.1 mg/dL 1.8-2.4                       [facility]  









                    ID                  Date                Data Source

 

                    [id number]          06/10/2020 01:38:00 PM EDT [facility] ([facility])









          Name      Value     Range     Interpretation Code Description Data Cinthya

rce(s) Supporting 

Document(s)

 

           Glucose [Mass/volume] in Serum or Plasma 76 mg/dL   74-99            

                [facility] ([facility]                              

 

                                        100-125 mg/dL     PRE-DIABETES/FASTING

>126 mg/dL          DIABETES/FASTING

 

 

          Creatinine 1.2 mg/dL 0.6-1.3                       [facility] ([facility]

[facility])  

 

           Sodium [Moles/volume] in Serum or Plasma 144 meq/L  136-145          

                [facility] ([facility])                             

 

           Urea nitrogen [Mass/volume] in Serum or Plasma 22 mg/dL   7-18       

                      [facility] 

([facility])                   

 

           Potassium [Moles/volume] in Serum or Plasma 4.2 meq/L  3.5-5.1       

                   [facility] 

([facility])                   

 

           Carbon dioxide, total [Moles/volume] in Serum or Plasma 33 meq/L   21

-32                            

[facility] ([facility])            

 

           Chloride [Moles/volume] in Serum or Plasma 104 meq/L           

                  [facility] 

([facility])                   

 

                                        Glomerular filtration rate/1.73 sq M pre

dicted among non-blacks [Volume 

Rate/Area] in Serum or Plasma by Creatinine-based formula (MDRD) 43 mL/min      

                                  

                          [facility] ([facility])  

 

           Calcium [Mass/volume] in Serum or Plasma 9.7 mg/dL  8.5-10.1         

                [facility] 

([facility])                   

 

                                        Glomerular filtration rate/1.73 sq M pre

dicted among blacks [Volume Rate/Area] 

in Serum or Plasma by Creatinine-based formula (MDRD) 52 mL/min                 

                          [facility] 

([facility])                   

 

                                        <content>CHRONIC KIDNEY DISEASE STAGING 

PER 

NKF</content><br/><content></content><br/><content>STAGE I & II      GFR >= 60  
     NORMAL TO MILDLY DECREASED</content><br/><content>STAGE III          GFR 
30-59          MODERATELY DECREASED</content><br/><content>STAGE IV           
GFR 15-29         SEVERELY DECREASED</content><br/><content>STAGE V            
GFR <15            VERY LITTLE GFR LEFT</content><br/><content>ESRD             
   GFR <15            ON RRT</content><br/><content></content> 









                    ID                  Date                Data Source

 

                    [id number]          06/10/2020 01:38:00 PM EDT [facility] ([facility])









          Name      Value     Range     Interpretation Code Description Data Cinthya

rce(s) Supporting 

Document(s)

 

           Leukocytes [#/volume] in Blood by Automated count 8.1 x10*3/UL 4.1-10

.9                         

[facility] ([facility])            

 

           Hemoglobin [Mass/volume] in Blood 14.8 g/dL  12.0-18.0               

         [facility] ([facility]                              

 

           Erythrocytes [#/volume] in Blood by Automated count 5.07 x10*6/UL 4.2

0-6.30                        

[facility] ([facility])            

 

           Hematocrit [Volume Fraction] of Blood by Automated count 44.9 %     3

7.0-51.0                        

[facility] ([facility])            

 

          MCHC      33.1 g/dL 31.0-38.0                     [facility]  

 

          MCH       29.3 pg   26.0-32.0                     [facility] [facility]  

 

          MCV       88.5 fL   80.0-97.0                     [facility] [facility]  

 

          MPV       8.7 FL    7.8-11.0                      [facility] [facility]  

 

           Erythrocyte distribution width [Ratio] by Automated count 14.2 %     

11.6-13.7                        

[facility] ([facility])            

 

           Platelets [#/volume] in Blood by Automated count 206 x10*3/-440

                          [facility]

 ([facility])                  

 

          Lymph %   16.9 %    10.0-58.5                     [facility]  

 

          Mid %     4.7 %     1.7-9.3                       [facility]  

 

          Neut %    78.4 %    37.0-92.0                     [facility]  

 

          Lymph #   1.3 x10*3/UL 0.6-4.1                       [facility] ([facility])  

 

          Neut #    6.3 x10*3/UL 2.0-7.8                       [facility] ([facility])  

 

          Mid #     0.5 x10*3/UL 0.1-0.6                       [facility] ([facility])  









                    ID                  Date                Data Source

 

                    [id number]          2020 01:59:00 PM EST [facility] ([facility])









          Name      Value     Range     Interpretation Code Description Data Cinthya

rce(s) Supporting 

Document(s)

 

          Lymphocytes 5 %       16-44                         [facility] ([facility]  

 

          Neutrophils 70 %      28-66                         [facility] ([facility]  

 

          Eosinophils 4 %       0-3                           [facility] ([facility]  

 

          Monocytes 2 %       0-5                           [facility]  

 

          Basophils 2 %       0-1                           [facility]  

 

          Atypical Lymph 17 %      0-5                           [facility] ([facility]  

 

          Poikilocytosis Laboratory test result                               ME

[facility] ([facility])  

 

          Ovalocytes Laboratory test result                               [facility]

 ([facility])  

 

          Platelet Estimate Laboratory test result                              

 [facility] ([facility])  









                    ID                  Date                Data Source

 

                    [id number]          2020 01:59:00 PM EST [facility] ([facility])









          Name      Value     Range     Interpretation Code Description Data Cinthya

rce(s) Supporting 

Document(s)

 

           Glucose [Mass/volume] in Serum or Plasma 104 mg/dL  74-99            

                [facility] ([facility])                              

 

                                        100-125 mg/dL     PRE-DIABETES/FASTING

>126 mg/dL          DIABETES/FASTING

 

 

           Urea nitrogen [Mass/volume] in Serum or Plasma 19 mg/dL   7-18       

                      [facility] 

([facility])                   

 

          Creatinine 1.2 mg/dL 0.6-1.3                       [facility] ([facility]

[facility])  

 

           Sodium [Moles/volume] in Serum or Plasma 144 meq/L  136-145          

                [facility]                             

 

           Potassium [Moles/volume] in Serum or Plasma 4.0 meq/L  3.5-5.1       

                   [facility] 

([facility])                   

 

           Chloride [Moles/volume] in Serum or Plasma 102 meq/L           

                  [facility] 

([facility])                   

 

                                        Glomerular filtration rate/1.73 sq M pre

dicted among non-blacks [Volume 

Rate/Area] in Serum or Plasma by Creatinine-based formula (MDRD) 43 mL/min      

                                  

                          [facility] ([facility])  

 

           Carbon dioxide, total [Moles/volume] in Serum or Plasma 36 meq/L   21

-32                            

[facility] ([facility])            

 

           Calcium [Mass/volume] in Serum or Plasma 10.0 mg/dL 8.5-10.1         

                [facility] 

([facility])                   

 

                                        Glomerular filtration rate/1.73 sq M pre

dicted among blacks [Volume Rate/Area] 

in Serum or Plasma by Creatinine-based formula (MDRD) 52 mL/min                 

                          [facility] 

[facility]                   

 

                                        <content>CHRONIC KIDNEY DISEASE STAGING 

PER 

NKF</content><br/><content></content><br/><content>STAGE I & II      GFR >= 60  
     NORMAL TO MILDLY DECREASED</content><br/><content>STAGE III          GFR 
30-59          MODERATELY DECREASED</content><br/><content>STAGE IV           
GFR 15-29         SEVERELY DECREASED</content><br/><content>STAGE V            
GFR <15            VERY LITTLE GFR LEFT</content><br/><content>ESRD             
   GFR <15            ON RRT</content><br/><content></content> 









                    ID                  Date                Data Source

 

                    [id number]          2020 01:59:00 PM EST [facility] ([facility])









          Name      Value     Range     Interpretation Code Description Data Cinthya

rce(s) Supporting 

Document(s)

 

           Leukocytes [#/volume] in Blood by Automated count 8.0 x10*3/UL 4.1-10

.9                         

[facility] ([facility])            

 

           Hemoglobin [Mass/volume] in Blood 14.0 g/dL  12.0-18.0               

         [facility] ([facility]                              

 

           Erythrocytes [#/volume] in Blood by Automated count 4.88 x10*6/UL 4.2

0-6.30                        

[facility] ([facility])            

 

          MCV       87.4 fL   80.0-97.0                     [facility] [facility])  

 

           Hematocrit [Volume Fraction] of Blood by Automated count 42.7 %     3

7.0-51.0                        

[facility] ([facility])            

 

          MCHC      32.9 g/dL 31.0-38.0                     [facility] [facility])  

 

          MCH       28.7 pg   26.0-32.0                     [facility] ([place] [facility])  

 

           Platelets [#/volume] in Blood by Automated count 207 x10*3/-440

                          [facility]

 ([facility]                  

 

           Erythrocyte distribution width [Ratio] by Automated count 14.3 %     

11.6-13.7                        

[facility] ([facility])            

 

          MPV       8.3 FL    7.8-11.0                      [facility] [facility])  

 

          Lymph %   8.8 %     10.0-58.5                     [facility] ([facility])  

 

                                        NOTE:

MANUAL DIFFERENTIAL SENT TO [facility] FOR VERIFICATION.





 

 

          Mid %     10.9 %    1.7-9.3                       [facility] ([facility])  

 

          Lymph #   0.7 x10*3/UL 0.6-4.1                       [facility]  

 

          Neut %    80.3 %    37.0-92.0                     [facility] ([facility])  

 

          Neut #    6.4 x10*3/UL 2.0-7.8                       [facility]  

 

          Mid #     0.9 x10*3/UL 0.1-0.6                       [facility]  









                    ID                  Date                Data Source

 

                    [id number]          2019 03:42:00 PM EST [facility]









          Name      Value     Range     Interpretation Code Description Data Cinthya

rce(s) Supporting 

Document(s)

 

           Bacteria identified in Urine by Culture FULL REPORT IN L <SEE NOTE>  

                                [facility]

 ([facility])                  

 

                                        FULL REPORT IN LAB NOTES (e[facility] and [facility]

).

NO GROWTH



 









                    ID                  Date                Data Source

 

                    [id number]          2019 03:42:00 PM EST [facility]









          Name      Value     Range     Interpretation Code Description Data Cinthya

rce(s) Supporting 

Document(s)

 

          Urine PH  6.0 units 5.0-9.0                       [facility] ([facility])  

 

           Urine Appearance SL. HAZY              Abnormal (applies to non-numer

ic results)            [facility] 

([facility])                   

 

           Urine Color STRAW                 Abnormal (applies to non-numeric re

sults)            [facility] 

[facility]                   

 

           Urine Blood TRACE                 Abnormal (applies to non-numeric re

sults)            [facility] 

([facility]                   

 

          Urine Leukocytes NEGATIVE                                [facility] ([place] [facility]  

 

          Specific gravity of Urine 1.015     1.005-1.030                     ME

[facility] ([facility])  

 

          Glucose [Presence] in Urine NEGATIVE mg/dL                            

   [facility] ([facility])  

 

          Urine Protein NEGATIVE  0-0                           [facility] ([facility])  

 

          Urine Nitrite NEGATIVE                                [facility] ([facility])  

 

          Urine Urobilinogen 0.2 mg/dL 0.2-1.0                       [facility] ([facility])  

 

          Urine Ketone NEGATIVE mg/dL                               [facility] ([facility])  

 

           Bilirubin.total [Mass/volume] in Serum or Plasma NEGATIVE            

                        [facility] 

([facility])                   









                    ID                  Date                Data Source

 

                    [id number]          2019 03:42:00 PM EST [facility] ([facility])









          Name      Value     Range     Interpretation Code Description Data Cinthya

rce(s) Supporting 

Document(s)

 

           Glucose [Mass/volume] in Serum or Plasma 133 mg/dL  74-99            

                [facility] ([facility])                              

 

                                        100-125 mg/dL     PRE-DIABETES/FASTING

>126 mg/dL          DIABETES/FASTING

 

 

           Urea nitrogen [Mass/volume] in Serum or Plasma 20 mg/dL   7-18       

                      [facility] 

([facility])                   

 

           Sodium [Moles/volume] in Serum or Plasma 141 meq/L  136-145          

                [facility] ([facility])                             

 

          Creatinine 1.3 mg/dL 0.6-1.3                       [facility] ([facility]

[facility])  

 

           Potassium [Moles/volume] in Serum or Plasma 3.7 meq/L  3.5-5.1       

                   [facility] 

([facility])                   

 

           Carbon dioxide, total [Moles/volume] in Serum or Plasma 36 meq/L   21

-32                            

[facility] ([facility])            

 

           Chloride [Moles/volume] in Serum or Plasma 99 meq/L            

                  [facility] ([facility])                             

 

                                        Glomerular filtration rate/1.73 sq M pre

dicted among non-blacks [Volume 

Rate/Area] in Serum or Plasma by Creatinine-based formula (MDRD) 39 mL/min      

                                  

                          [facility] ([facility])  

 

                                        Glomerular filtration rate/1.73 sq M pre

dicted among blacks [Volume Rate/Area] 

in Serum or Plasma by Creatinine-based formula (MDRD) 48 mL/min                 

                          [facility] 

([facility])                   

 

                                        <content>CHRONIC KIDNEY DISEASE STAGING 

PER 

NKF</content><br/><content></content><br/><content>STAGE I & II      GFR >= 60  
     NORMAL TO MILDLY DECREASED</content><br/><content>STAGE III          GFR 
30-59          MODERATELY DECREASED</content><br/><content>STAGE IV           
GFR 15-29         SEVERELY DECREASED</content><br/><content>STAGE V            
GFR <15            VERY LITTLE GFR LEFT</content><br/><content>ESRD             
   GFR <15            ON RRT</content><br/><content></content> 

 

           Calcium [Mass/volume] in Serum or Plasma 10.0 mg/dL 8.5-10.1         

                [facility] 

([facility])                   









                    ID                  Date                Data Source

 

                    [id number]            2019 03:42:00 PM EST [facility] ([facility])









          Name      Value     Range     Interpretation Code Description Data Cinthya

rce(s) Supporting 

Document(s)

 

           Urea nitrogen [Mass/volume] in Serum or Plasma 20 mg/dL   7-18       

                      [facility] 

([facility])           

 

           Glucose [Mass/volume] in Serum or Plasma 133 mg/dL  74-99            

                [facility] ([facility])                       

 

                                        100-125 mg/dL     PRE-DIABETES/FASTING

>126 mg/dL          DIABETES/FASTING



 

 

           Sodium [Moles/volume] in Serum or Plasma 141 meq/L  136-145          

                [facility] 

([facility])           

 

          Creatinine 1.3 mg/dL 0.6-1.3                       [facility] ([facility]  

 

           Potassium [Moles/volume] in Serum or Plasma 3.7 meq/L  3.5-5.1       

                   [facility] 

([facility])           

 

           Chloride [Moles/volume] in Serum or Plasma 99 meq/L            

                  [facility] 

([facility])           

 

           Carbon dioxide, total [Moles/volume] in Serum or Plasma 36 meq/L   21

-32                            

[facility] ([facility])    

 

           Calcium [Mass/volume] in Serum or Plasma 10.0 mg/dL 8.5-10.1         

                [facility] 

([facility])           

 

                                        Glomerular filtration rate/1.73 sq M pre

dicted among blacks [Volume Rate/Area] 

in Serum or Plasma by Creatinine-based formula (MDRD) 48 mL/min                 

                          [facility] 

([facility])           

 

                                        <content>CHRONIC KIDNEY DISEASE STAGING 

PER 

NKF</content><br/><content></content><br/><content>STAGE I & II      GFR >= 60  
     NORMAL TO MILDLY DECREASED</content><br/><content>STAGE III          GFR 
30-59          MODERATELY DECREASED</content><br/><content>STAGE IV           
GFR 15-29         SEVERELY DECREASED</content><br/><content>STAGE V            
GFR <15            VERY LITTLE GFR LEFT</content><br/><content>ESRD             
   GFR <15            ON 
RRT</content><br/><content></content><br/><content></content> 

 

                                        Glomerular filtration rate/1.73 sq M pre

dicted among non-blacks [Volume 

Rate/Area] in Serum or Plasma by Creatinine-based formula (MDRD) 39 mL/min      

                                  

                          [facility] ([facility])  

 

           Urine Color Straw                 Abnormal (applies to non-numeric re

sults)            [facility] 

([facility])           

 

           Urine Appearance SL. Hazy              Abnormal (applies to non-numer

ic results)            [facility] 

([facility])           

 

          Urine Leukocytes Negative                                [facility] ([facility]

[facility])  

 

           Specific gravity of Urine 1.015      1.005-1.030                     

  [facility] ([facility])                                  

 

          pH of Urine 6.0 units 5.0-9.0                       [facility] ([facility]  

 

           Urine Blood Trace                 Abnormal (applies to non-numeric re

sults)            [facility] 

([facility])           

 

           Glucose [Presence] in Urine Negative mg/dL                           

       [facility] ([facility])                                  

 

          Urine Protein Negative  0-0                           [facility] ([facility]

[facility])  

 

          Urine Nitrite Negative                                [facility] ([facility]

[facility])  

 

          Urine Ketone Negative mg/dL                               [facility] ([facility]

[facility])  

 

          Urine Urobilinogen 0.2 mg/dL 0.2-1.0                       [facility] ([facility]

[facility])  

 

           Bacteria identified in Urine by Culture Full Report In L <See Note>  

                                [facility]

 ([facility])          

 

                                        FULL REPORT IN LAB NOTES (e[facility] and [facility]

).

NO GROWTH





 

 

           Bilirubin.total [Mass/volume] in Serum or Plasma Negative            

                        [facility] 

([facility])           







                                        Procedure

 

                                          



                                                                                
                                                                                
                                                                                
                                                                                
                                                                                
                                                



Social History

          



           Code       Duration   Value      Status     Description Data Source(s

)

 

             Smoking      2020 12:00:00 AM EST Patient is a former smoker 

completed    Patient 

is a former smoker                      [facility] ([facility])



                                                                                
                  



Vital Signs

          



                    ID                  Date                Data Source

 

                    UNK                                      









           Name       Value      Range      Interpretation Code Description Data

 Source(s)

 

           Diastolic blood pressure--sitting 58 mm[Hg]                        58

 mm[Hg]  [facility] ([facility])

 

                                        large cuff, Ra 

 

           Systolic blood pressure--sitting 118 mm[Hg]                       118

 mm[Hg] [facility] ([facility])

 

                                        large cuff, Ra 

 

           Heart rate 53 /min                          53 /min    [facility] ([facility]

[facility])

 

           Body mass index (BMI) [Ratio] 24.9 kg/m2                       24.9 k

g/m2 [facility] ([facility])

 

           Body height 66 [in_i]                        66 [in_i]  [facility]

 

                                        5'6" 

 

           Body weight 154.00 [lb_av]                       154.00 [lb_av] [facility]

T ([facility])

 

           Oxygen saturation in Arterial blood by Pulse oximetry 94 %           

                  94 %       [facility] 

([facility])

 

           Body weight 163.00 [lb_av]                       163.00 [lb_av] [facility]

T ([facility])

 

           Heart rate 82 /min                          82 /min    [facility]

 

           Body mass index (BMI) [Ratio] 24.1 kg/m2                       24.1 k

g/m2 [facility] ([facility]

 

           Oxygen saturation in Arterial blood by Pulse oximetry 95 %           

                  95 %       [facility] 

[facility])

 

           Body weight 147.00 [lb_av]                       147.00 [lb_av] [facility]

T ([facility])

 

           Body height 65.5 [in_i]                       65.5 [in_i] [facility] ([facility])

 

                                        5'5.50" 

 

           Respiratory rate 14 /min                          14 /min    [facility]

[facility])

 

           Body temperature 98.4 [degF]                       98.4 [degF] [facility]

 ([facility])

 

           Heart rate 56 /min                          56 /min    [facility]

 

           Diastolic blood pressure 50 mm[Hg]                        50 mm[Hg]  

[facility]

 

           Systolic blood pressure 130 mm[Hg]                       130 mm[Hg] M

[facility]

 

           Body weight 148.00 [lb_av]                       148.00 [lb_av] [facility]

T ([facility])

 

           Body mass index (BMI) [Ratio] 24.8 kg/m2                       24.8 k

g/m2 [facility] ([facility])

 

           Oxygen saturation in Arterial blood by Pulse oximetry 97 %           

                  97 %       [facility] 

([facility])

 

           Body weight 151.50 [lb_av]                       151.50 [lb_av] [facility]

T ([facility])

 

           Body height 65.50 [in_i]                       65.50 [in_i] [facility] ([facility])

 

                                        5'5.50" 

 

           Heart rate 42 /min                          42 /min    [facility]

 

           Diastolic blood pressure 70 mm[Hg]                        70 mm[Hg]  

[facility] ([facility])

 

           Systolic blood pressure 130 mm[Hg]                       130 mm[Hg] [facility] ([facility])

 

           Body mass index (BMI) [Ratio] 25.8 kg/m2                       25.8 k

g/m2 [facility]

 

           Oxygen saturation in Arterial blood by Pulse oximetry 95 %           

                  95 %       [facility] 

([facility])

 

                                        [facility] 

 

           Body weight 157.50 [lb_av]                       157.50 [lb_av] [facility]

T ([facility])

 

           Body height 65.50 [in_i]                       65.50 [in_i] [facility] ([facility])

 

                                        5'5.50" 

 

           Heart rate 57 /min                          57 /min    [facility]

 

           Diastolic blood pressure 70 mm[Hg]                        70 mm[Hg]  

[facility]

 

           Systolic blood pressure 132 mm[Hg]                       132 mm[Hg] [facility] ([facility])

 

           Diastolic blood pressure--sitting 68 mm[Hg]                        68

 mm[Hg]  [facility] ([facility])

 

           Systolic blood pressure--sitting 122 mm[Hg]                       122

 mm[Hg] [facility] ([facility])

 

           Heart rate 51 /min                          51 /min    [facility] ([facility]

[facility])

 

           Body mass index (BMI) [Ratio] 26.0 kg/m2                       26.0 k

g/m2 [facility] ([facility])

 

           Body height 66 [in_i]                        66 [in_i]  [facility] ([facility]

[facility])

 

                                        5'6" 

 

           Body weight 161.00 [lb_av]                       161.00 [lb_av] [facility]

T ([facility])

 

           Body weight 72.633 kg                        72.633 kg  [facility]

 

           Body mass index (BMI) [Ratio] 27.5 kg/m2                       27.5 k

g/m2 [facility] ([facility])

 

           Body weight 160.12 [lb_av]                       160.12 [lb_av] [facility]

T ([facility])

 

           Body height 64 [in_i]                        64 [in_i]  [facility] ([facility])

 

                                        5'4" 

 

           Oxygen saturation in Arterial blood by Pulse oximetry 97 %           

                  97 %       [facility] 

([facility])

 

           Heart rate 53 /min                          53 /min    [facility] ([facility])

 

           Diastolic blood pressure 68 mm[Hg]                        68 mm[Hg]  

[facility] ([facility])

 

           Systolic blood pressure 126 mm[Hg]                       126 mm[Hg] [facility] ([facility])

 

           Body mass index (BMI) [Ratio] 26.4 kg/m2                       26.4 k

g/m2 [facility]

 

           Oxygen saturation in Arterial blood by Pulse oximetry 95 %           

                  95 %       [facility] 

([facility])

 

           Body weight 161.00 [lb_av]                       161.00 [lb_av] [facility]

T ([facility])

 

           Body height 65.50 [in_i]                       65.50 [in_i] [facility]

 

                                        5'5.50" 

 

           Heart rate 60 /min                          60 /min    [facility]

 

           Diastolic blood pressure 50 mm[Hg]                        50 mm[Hg]  

[facility] ([facility])

 

           Systolic blood pressure 128 mm[Hg]                       128 mm[Hg] [facility] ([facility])



                                                                                
                  



Patient Treatment Plan of Care

          



             Planned Activity Planned Date Details      Description  Data Source

(s)

 

             Klor-Con M10 10 MEQ 2020 12:00:00 AM [facility])

 

             Ipratropium-Albuterol 0.5-2.5 (3) MG/3ML 2020 12:00:00 AM EST

                           [facility]

 ([facility])

 

             Biaxin 500 MG 2020 12:00:00 AM EST                           

[facility] ([facility])

 

             Xopenex 0.63 MG/3ML 2020 12:00:00 AM EST                     

      [facility] ([facility])

 

             Spironolactone 25 MG 2020 12:00:00 AM EST                    

       [facility] ([facility])

 

             Multivitamin 2020 12:00:00 AM EST                           N

[facility] ([facility])

 

             Simvastatin 40 MG 2020 12:00:00 AM EST                       

    [facility] ([facility])

 

             Coumadin 3 MG 2020 12:00:00 AM EST                           

[facility] ([facility])

 

             Albuterol Sulfate (2.5 MG/3ML) 0.083% 2020 12:00:00 AM EST   

                        [facility] 

([facility])

 

             Tylenol      2020 12:00:00 AM EST                           N

[facility] ([facility])

 

             Torsemide 100 MG 2020 12:00:00 AM EST                        

   [facility] ([facility])

 

             Calcium      2020 12:00:00 AM EST                           N

[facility] ([facility])

 

             Aricept 5 MG 2020 12:00:00 AM EST                           N

[facility] ([facility])

 

             Levothyroxine 2020 12:00:00 AM EST                           

[facility] ([facility])

 

             Incruse Ellipta 62.5 MCG/INH 2020 12:00:00 AM EST            

               [facility] ([facility])

 

             Vitamin D    2020 12:00:00 AM EST                           N

[facility] ([facility])

 

             Advair Diskus 100-50 MCG/DOSE 2020 12:00:00 AM EST           

                [facility] ([facility])

 

             Colace       2020 12:00:00 AM EST                           N

[facility] ([facility])

## 2021-01-22 NOTE — REP
INDICATION:

hip pain



COMPARISON:

None.



TECHNIQUE:

AP and frog-lateral views of the left hip



FINDINGS:

Age-related degenerative changes include increased sclerosis to the acetabulum with

joint space narrowing and marginal spurring.  No acute fracture or dislocation.



IMPRESSION:

Age related arthritic changes.  No acute fracture or dislocation.





<Electronically signed by Miguel Ángel Hedrick > 01/22/21 0748

## 2021-01-22 NOTE — CCD
Continuity of Care Document (CCD)

                             Created on: 2020



Xena Basilio

External Reference #: [id number]

: 1935

Sex: Female



Demographics





                          Address                   [address]

[place]  [address]

 

                          Home Phone                [phone]

 

                          Preferred Language        Unknown

 

                          Marital Status            

 

                          Sabianism Affiliation     Unknown

 

                          Race                      White

 

                          Ethnic Group              Not  or 





Author





                          Organization              Unknown

 

                          Address                   Unknown

 

                          Phone                     Unavailable







Care Team Providers





                    Care Team Member Name Role                Phone

 

                    Haris Telles JR, MD AUTM                Unavailable

 

                    Tiny Arnett  AUTM                [phone]

 

                    Kandy Rosas MD    AUTM                [phone]

 

                    Ronan Romero MD AUTM                [phone]

 

                    Екатерина Ward   AUTM                [phone]

 

                    [place] [facility]  AUTM                [phone]







Problems





                    Active Problems     Provider            Date

 

                    Anticoagulants Long Term (Current) Use                     O

nset: 1997

 

                    Atrial fibrillation                     Onset: 1997

 

                    Contact dermatitis  CLARISSA Bajwa    Onset: 2011

 

                    Heart valve replacement CLARISSA Bajwa    Onset: 

3

 

                    Mitral valve disorder CLARISSA Bajwa    Onset: 2013

 

                    Essential hypertension JOON Robin Onset: 

3

 

                    Anticoagulant agent CLARISSA Bajwa    Onset: 07/15/2015

 

                    Chronic atrial fibrillation Haris Telles MD Onset: 

 

                    Hypothyroidism      Haris Telles MD Onset: 10/04/2017







Social History





                Type            Date            Description     Comments

 

                Birth Sex                       Unknown          

 

                Tobacco Use     Start: Unknown End: Unknown Patient is a former 

smoker  

 

                Exercise Type/Frequency                 Exercises rarely  







Allergies, Adverse Reactions, Alerts





             Active Allergies Reaction     Severity     Comments     Date

 

             Codeine,PCN                                         2010

 

             Thorazine                                           2016







Medications





           Active Medications SIG        Qnty       Indications Ordering Provide

r Date

 

                          Xopenex                     0.63mg/3ML Nebulizer      

             use four 

times daily prn for wheezing and shortness of breath. DX J44.9 36ml             

                       CLARISSA Bajwa                               10/26/2020

 

                                        [facility] Probiotic   

                   Capsules             

                one twice daily while on antibiotics or if having diarrhea 30cap

s                          Екатерина Nguyen [facility]                               10/23/2020

 

                                        Vitamin D3 Ultra Strength               

      125mcg (5000 Ut) Capsules         

             1 by mouth every day 90caps                    Екатерина Nguyen [facility] 

 

                          Nebulizer Kit/Tubing/Mouthpiece                      K

it                   for 

use with nebulizer--use up to four times a day dx j44.9 1units                  

                Екатерина Nguyen 

[facility]                                     2019

 

                          Incruse Ellipta                     62.5mcg/Inh Aeroso

l                   inhale

one puff by mouth daily 3units                          Екатерина Nguyen [facility] 01/15/2

019

 

                          Levothyroxine Sodium                     25mcg Tablets

                   1 

tablet by mouth every day 90tabs                          Екатерина Nguyen [facility] 10/04

/2017

 

                          Aricept                     5mg Tablets               

    1 by mouth every day 

at bedtime      90tabs          G30.1           Sravan Franks M.D. 10/03/2017

 

                          Advair Diskus                     100-50mcg/Dose Aeros

ol                   

inhale one puff by mouth twice a day 180units                        Екатерина Nguyen [facility] 2017

 

                          Colace                     100mg Capsules             

      1 by mouth twice a 

day             180caps         K59.00          KEATON Akins JR 

017

 

                          Calcium 500+D                     500-200mg-Unit Table

ts                   1 by 

mouth three times a day 270tabs                         Haris Telles MD 

 

                          Duoneb                     0.5-2.5(3)mg/3ML Solution  

                 inhale 

the contents of one vial via nebulizer four times a day as needed for wheezing 
or shortness of breath 270ml           R06.02          Екатерина Nguyen [facility] 05/15/20

17

 

                          Torsemide                     100mg Tablets           

        1tablet by mouth 

daily           30tabs          R60.0           Екатерина Nguyen [facility] 05/15/2017

 

                          Tylenol                     325mg Capsules            

       2 by mouth every 

day as needed   180caps                         Екатерина Nguyen [facility] 2017

 

                                        Albuterol Sulfate                     (2

.5mg/3ML) 0.083% Nebulizer              

             q.i.d. prn via nebulizer dx J44.9 75ml                      DIOGO Bajwa 2016

 

                          Coumadin                     3mg Tablets              

     take 1 tablet by 

mouth once a day as directed 90tabs                          Екатерина Nguyen [facility] 

 

                          Simvastatin                     40mg Tablets          

         take one tablet 

by mouth at bedtime 90tabs                          Екатерина Nguyen [facility] 2015

 

                    Multi-Vitamins                      Tablets                 

  1 by mouth daily    

90tabs                                  Екатерина Nguyen, [facility]    2003

 

                          Spironolactone                     25mg Tablets       

            1 by mouth 

every day                                       Unknown         

 

                          Klor-Con M10                     10Meq Tablets ER     

              2 by mouth 

every day                                       Unknown         

 

                          Losartan Potassium                     25mg Tablets   

                1 by mouth

every day                                       Unknown         

 

                          Ciprofloxacin HCL                     500mg Tablets   

                1 tab by 

mouth twice daily                                 Unknown         

 

                          Metronidazole                     500mg Tablets       

            one by mouth 

three times a day for 10 days                                 Unknown         







Medications Administered in Office





           Medication SIG        Qnty       Indications Ordering Provider Date

 

                          Administration Of Flu Vaccine                      Inj

ection                    

                                                Екатерина Nguyen, [facility] 2019

 

                                        Immunization Adminstration,1 Vaccine/Tox

oid                      Injection      

                                                    Екатерина Nguyen, [facility] 2019

 

                          Administration Of Flu Vaccine                      Inj

ection                    

                                                Екатерина Nguyen, [facility] 10/19/2018

 

                          Administration Of Flu Vaccine                      Inj

ection                    

                                                Kelsea Adams [facility] 10/03/2017

 

                          Administration Of Flu Vaccine                      Inj

ection                    

                                                Kelsea Adams, [facility] 10/13/2016

 

                          Administration Of Flu Vaccine                      Inj

ection                    

                                                Hrais Telles MD 10/15/2015

 

                          Administration Of Flu Vaccine                      Inj

nonaion                    

                                                Haris Telles MD 10/23/2014

 

                          Administration Of Flu Vaccine                      Inj

nonaion                    

                                                Haris Telles MD 10/12/2012

 

                          Administration Of Flu Vaccine                      Inj

nonaion                    

                                                Haris Telles MD 10/11/2011

 

                          Administration Of Flu Vaccine                      Inj

bruce Telles MD 10/07/2010

 

                          Administration Of Flu Vaccine                      Inj

bruce Telles MD 10/08/2009

 

                          Administration Of Flu Vaccine                      Inj

nonaion                    

                                                Haris Telles MD 2008

 

                          Administration Of Flu Vaccine                      Inj

nonaion                    

                                                Haris Telles MD 10/18/2007

 

                Administration Of Zostavax                      Injection       

                                            

                          Haris Telles MD     2007

 

                          Administration Of Flu Vaccine                      Inj

ection                    

                                                JOON Robin 2006

 

                          Administration Of Flu Vaccine                      Inj

ection                    

                                                Haris Telles MD 10/04/2005

 

                          Administration Of Flu Vaccine                      Inj

ection                    

                                                DIOGO Bajwa 10/19/2004

 

                          Administration Of Flu Vaccine                      Inj

nonaion                    

                                                Haris Telles MD 2003

 

                          Administration Of Flu Vaccine                      Inj

CLARISSA Irby 10/08/2002







Immunizations





             CPT Code     Status       Date         Vaccine      Lot #

 

             U-Flu        Given        10/05/2020   Influenza,Unspecified  

 

             [id number]        Given        2020   PPD          [id number]

 

                66674           Given           2019      Influenza Vaccin

e Quadrivalent Preser/Antibiotic Free Im 

Use                                     [id number]

 

             19068        Given        2019   Adacel- Tetanus Diphtheria P

ertussis (Age65 & Under) 

[id number]

 

             08400        Given        2019   Shingrix      

 

             94495        Given        2018   Shingrix      

 

                60511           Given           10/19/2018      Influenza Virus 

Vaccine, Quadrivalent (Cciiv4), Derived 

From Cell                               [id number]

 

                26100           Given           10/03/2017      Influenza Vaccin

e Quadrivalent Preser/Antibiotic Free Im 

Use                                     [id number]

 

                     Given        10/13/2016   Fluvirin Virus Vaccine 70517

01

 

                     Given        10/15/2015   Fluvirin Virus Vaccine 40706

01

 

             32012        Given        2015   Prevnar 13   [id number]

 

                     Given        10/23/2014   Fluvirin Virus Vaccine 80488

21

 

                     Given        10/12/2012   Fluvirin Virus Vaccine 99419

01

 

                     Given        10/11/2011   Fluvirin Virus Vaccine  

 

                     Given        10/11/2011   Fluvirin Virus Vaccine  

 

             01306        Given        10/07/2010   Influenza Virus Vaccine  

 

             09157        Given        2009   Pneumovax 23  

 

             48696        Given        10/08/2009   Influenza Virus Vaccine  

 

             09059        Given        2008   Influenza Virus Vaccine  

 

             68091        Given        10/18/2007   Influenza Virus Vaccine  

 

             13999        Given        2007   Zoster Vaccine  

 

             77753        Given        2006   Influenza Virus Vaccine  

 

             44688        Given        10/04/2005   Influenza Virus Vaccine  

 

             88553        Given        10/19/2004   Influenza Virus Vaccine  

 

             08008        Given        2003   Influenza Virus Vaccine  

 

             37537        Given        10/08/2002   Influenza Virus Vaccine  

 

             09328        Given        2001   Influenza Virus Vaccine  

 

             66297        Given        2001   Tetanus Toxoid  

 

             U-Pneum      Given        10/01/2000   Pneumococcal,Unspecified  

 

             13135        Given        10/21/1999   Influenza Virus Vaccine  

 

             19688        Given        10/25/1994   Influenza Virus Vaccine  







Vital Signs





                Date            Vital           Result          Comment

 

                10/29/2020 11:21am Heart Rate      82 /min          

 

                    Weight              163.00 lb            

 

                    O2 % BldC Oximetry  94 %                 

 

                09/10/2020  1:40pm BP Systolic     130 mmHg         

 

                    BP Diastolic        50 mmHg              

 

                    Heart Rate          56 /min              

 

                    Body Temperature    98.4 F             

 

                    Respiratory Rate    14 /min              

 

                    Height              65.5 inches         5'5.50"

 

                    Weight              147.00 lb            

 

                    O2 % BldC Oximetry  95 %                 

 

                    BMI (Body Mass Index) 24.1 kg/m2           







Results





        Test    Acquired Date Facility Test    Result  H/L     Range   Note

 

                    Laboratory test finding 2020          [facility]

                                        [address] [place] [address] [phone] iSTAT Troponin 0.01 NG/ML Normal     0.00-0.08   

 

                    Istat Chem8+ Panel  2020          [facility]

nter

                                        [address] [place] [address] [phone] iSTAT HCT  40.0 %     Normal     38.0-51.0   

 

             iSTAT Glucose 89 mg/dL     Normal               

 

             iSTAT Sodium 138 mEq/L    Normal       136-145       

 

             iSTAT Potassium 3.7 mEq/L    Normal       3.5-5.1       

 

             iSTAT CA++   4.6 mg/dL    Normal       4.5-5.3       

 

             iSTAT Chloride 98 mEq/L     Normal               

 

             iSTAT Co2    32.0 MM/L    High         23.0-27.0     

 

             iSTAT BUN    26 mg/dL     Normal       8-26          

 

             iSTAT Creatinine 1.1 mg/dL    Normal       0.6-1.3       

 

                    Laboratory test finding 2020          [address] [address] [phone] Bedside Glucose 83 mg/dL   Normal           

 

                    CBC With Differential 2020          [address] [address] [phone] White Blood Count 8.5 10     Normal     4.0-10.0    

 

             Red Blood Count 4.35 10      Normal       4.00-5.40     

 

             Hemoglobin   13.0 g/dL    Normal       12.0-15.5     

 

             Hematocrit   40.6 %       Normal       36.0-47.0     

 

             Mean Corpuscular Volume 93.3 fl      Normal       80.0-96.0     

 

             Mean Corpuscular Hemoglobin 29.9 pg      Normal       27.0-33.0    

 

 

             Mean Corpuscular HGB Conc 32.0 g/dL    Normal       32.0-36.5     

 

             Red Cell Distribution Width 15.2 %       High         11.5-14.5    

 

 

             Platelet Count, Automated 194 10       Normal       150-450       

 

             Neutrophils % 65.5 %       Normal       36.0-66.0     

 

             Lymph %      12.8 %       Low          24.0-44.0     

 

             Mono %       18.0 %       High         0.0-5.0       

 

             Eos %        2.2 %        Normal       0.0-3.0       

 

             Baso %       1.1 %        High         0.0-1.0       

 

             Immature Granulocyte % 0.4 %        Normal       0-3.0         

 

             Nucleated Red Blood Cell % 0.0 %        Normal       0-0           

 

             Neutrophils # 5.5 10       Normal       1.5-8.5       

 

             Lymph #      1.1 10       Low          1.5-5.0       

 

             Mono #       1.5 10       High         0.0-0.8       

 

             Eos #        0.2 10       Normal       0.0-0.5       

 

             Baso #       0.1 10       Normal       0.0-0.2       

 

                    Laboratory test finding 2020          [facility]

                                        [address] [place] [address] [phone] Thyroid Stimulating Hormone 5.120 uIU/ML High       0.

358-3.740  

 

                    Prothrombin Time/Inr 2020          [facility]

enter

                                        [address] [place] [address] [phone] Prothrombin Time 26.5 seconds High       12.5-14.3   

 

             Inr          2.38         Normal                    1

 

                    Complete Blood Count 2020          [facility]

enter

                                        [address] [place] [address] [phone] White Blood Count 7.8 10     Normal     4.0-10.0    

 

             Red Blood Count 4.46 10      Normal       4.00-5.40     

 

             Hemoglobin   13.3 g/dL    Normal       12.0-15.5     

 

             Hematocrit   41.6 %       Normal       36.0-47.0     

 

             Mean Corpuscular Volume 93.3 fl      Normal       80.0-96.0     

 

             Mean Corpuscular Hemoglobin 29.8 pg      Normal       27.0-33.0    

 

 

             Mean Corpuscular HGB Conc 32.0 g/dL    Normal       32.0-36.5     

 

             Red Cell Distribution Width 15.9 %       High         11.5-14.5    

 

 

             Platelet Count, Automated 231 10       Normal       150-450       

 

             Nucleated Red Blood Cell % 0.0 %        Normal       0-0           

 

                    Comprehensive Metabolic Profil 2020          [facility]

                                        [address] [place] [address] [phone] Glucose, Fasting 99 mg/dL   Normal           

 

             Blood Urea Nitrogen 21 mg/dL     High         7-18          

 

             Creatinine For GFR 1.07 mg/dL   Normal       0.55-1.30     

 

             Glomerular Filtration Rate 52.1         Normal       >32          2

 

             Sodium Level 136 mEq/L    Normal       136-145       

 

             Potassium Serum 4.2 mEq/L    Normal       3.5-5.1       

 

             Chloride Level 95 mEq/L     Low                  

 

             Carbon Dioxide Level 35 mEq/L     High         21-32         

 

             Anion Gap    6 mEq/L      Low          8-16          

 

             Calcium Level 10.1 mg/dL   Normal       8.8-10.2      

 

             Ast/Sgot     32 U/L       Normal       7-37          

 

             Alt/SGPT     23 U/L       Normal       12-78         

 

             Alkaline Phosphatase 66 U/L       Normal               

 

             Bilirubin,Total 0.7 mg/dL    Normal       0.2-1.0       

 

             Total Protein 6.6 GM/DL    Normal       6.4-8.2       

 

             Albumin      3.7 GM/DL    Normal       3.2-5.2       

 

             Albumin/Globulin Ratio 1.3          Normal       1.2-2.2       

 

                    Type & Screen -Incl Blood Type,Walker,AB SC 10/20/2020         

 [address] [address] [phone] Blood Type A POSITIVE  Normal                 

 

             AB Screen (Indirect Anatoly)Vis NEGATIVE     Normal                 

    

 

                    Laboratory test finding 10/20/2020          [facility]

                                        [address] [place] [address] [phone] Lipase     157 U/L    Normal           

 

             Thyroid Stimulating Hormone 3.480 uIU/ML Normal       0.358-3.740  

 

 

                    Liver Profile       10/20/2020          [facility]

nter

                                        [address] [place] [address] [phone] Ast/Sgot   23 U/L     Normal     7-37        

 

             Alt/SGPT     24 U/L       Normal       12-78         

 

             Alkaline Phosphatase 72 U/L       Normal               

 

             Bilirubin,Total 1.2 mg/dL    High         0.2-1.0       

 

             Bilirubin,Direct 0.4 mg/dL    High         0.0-0.2       

 

             Total Protein 6.7 GM/DL    Normal       6.4-8.2       

 

             Albumin      3.7 GM/DL    Normal       3.2-5.2       

 

             Albumin/Globulin Ratio 1.2          Normal       1.2-2.2       

 

                    Cardiac Marker Panel 10/20/2020          [facility]

enter

                                        [address] [place] [address] [phone] CPK Creatine Phosphokinase 62 U/L     Normal     26-19

2      

 

             CK-MB Value Mass < 1.0 NG/ML  Normal       <3.6          

 

             MB/CK Relative Index 1.61         Normal       < Or =4      3

 

             Troponin I   < 0.02 NG/ML Normal       < 0.10       4

 

                    Ua W/ Reflex To Culture 10/20/2020          [address] [address] [phone] Appearance, Urine RFX CLEAR      Normal     Clear     

  

 

             Color, Urine RFX YELLOW       Normal       Yellow        

 

             PH,Urine RFX 6.0 units    Normal       5.0-9.0       

 

             Specific Gravity Ur Auto RFX 1.012        Normal       1.002-1.035 

  

 

             Protein, Urine Auto RFX NEGATIVE mg/dL Normal       Negative      

 

             Glucose, Urine (Ua) Auto RFX NEGATIVE mg/dL Normal       Negative  

    

 

             Ketone, Urine Auto RFX NEGATIVE mg/dL Normal       Negative      

 

             Urobilinogen, Urine Auto RFX 0.2 mg/dL    Normal       0.0-2.0     

  

 

             Bilirubin, Urine Auto RFX NEGATIVE     Normal       Negative      

 

             Nitrite, Urine Auto RFX NEGATIVE     Normal       Negative      

 

             Leukocyte Esterase Ur Auto RFX NEGATIVE     Normal       Negative  

    

 

             Blood, Urine Blood RFX NEGATIVE     Normal       Negative      

 

             WBC, Urine Auto RFX 1 /HPF       Normal       0-3           

 

             RBC, Urine Auto RFX 1 /HPF       Normal       0-3           

 

             Bacteria, Urine Auto RFX NEGATIVE     Normal       Negative      

 

             Squam Epithelial Cell Ur Aurfx 0 /HPF       Normal       0-6       

    

 

             Hyaline Cast, Urine Auto RFX 0 /LPF       Normal       0-1         

  

 

                    Laboratory test finding 10/20/2020          [id number] [place] [address] [phone] Ammonia    < 10 uMOL/L Normal     <32         

 

                    CBC With Differential 10/20/2020          [address] [address] [phone] White Blood Count 14.2 10    High       4.0-10.0    

 

             Red Blood Count 4.35 10      Normal       4.00-5.40     

 

             Hemoglobin   13.1 g/dL    Normal       12.0-15.5     

 

             Hematocrit   40.2 %       Normal       36.0-47.0     

 

             Mean Corpuscular Volume 92.4 fl      Normal       80.0-96.0     

 

             Mean Corpuscular Hemoglobin 30.1 pg      Normal       27.0-33.0    

 

 

             Mean Corpuscular HGB Conc 32.6 g/dL    Normal       32.0-36.5     

 

             Red Cell Distribution Width 15.3 %       High         11.5-14.5    

 

 

             Platelet Count, Automated 178 10       Normal       150-450       

 

             Neutrophils % 75.0 %       High         36.0-66.0     

 

             Lymph %      5.6 %        Low          24.0-44.0     

 

             Mono %       17.9 %       High         0.0-5.0       

 

             Eos %        0.5 %        Normal       0.0-3.0       

 

             Baso %       0.4 %        Normal       0.0-1.0       

 

             Immature Granulocyte % 0.6 %        Normal       0-3.0         

 

             Nucleated Red Blood Cell % 0.0 %        Normal       0-0           

 

             Neutrophils # 10.7 10      High         1.5-8.5       

 

             Lymph #      0.8 10       Low          1.5-5.0       

 

             Mono #       2.5 10       High         0.0-0.8       

 

             Eos #        0.1 10       Normal       0.0-0.5       

 

             Baso #       0.1 10       Normal       0.0-0.2       

 

                    Istat PT/Inr        10/20/2020          [id number] [place], [place] [id number] [phone] iSTAT Protime Seconds 31.3 seconds High       12.1-14.

4   

 

             iSTAT Inr    2.7          Normal                     

 

                    Istat Chem8+ Panel  10/20/2020          [facility]

nter

                                        [address] [place], [place] [id number] [phone] iSTAT HCT  41.0 %     Normal     38.0-51.0   

 

             iSTAT Glucose 107 mg/dL    High                 

 

             iSTAT Sodium 136 mEq/L    Normal       136-145       

 

             iSTAT Potassium 3.9 mEq/L    Normal       3.5-5.1       

 

             iSTAT CA++   5.1 mg/dL    Normal       4.5-5.3       

 

             iSTAT Chloride 95 mEq/L     Low                  

 

             iSTAT Co2    30.0 MM/L    High         23.0-27.0     

 

             iSTAT BUN    30 mg/dL     High         8-26          

 

             iSTAT Creatinine 1.2 mg/dL    Normal       0.6-1.3       

 

                    Laboratory test finding 10/20/2020          [facility]

                                        [address] [place], [place] [id number] [phone] iSTAT Lactate 0.90       Normal     0.4-2.0     

 

                    Laboratory test finding 10/20/2020          [facility]

                                        [address] [place] [address] [phone] iSTAT Troponin 0.02 NG/ML Normal     0.00-0.08   

 

                    Complete Blood Count 09/10/2020          [facility]

s, pc

: Dr Haris Telles

[place] [address] [phone] WBC        7.4 x10*3/UL            4.1 - 10.9  

 

             RBC          5.02 x10*6/UL              4.20 - 6.30   

 

             Hemoglobin   14.8 g/dL                 12.0 - 18.0   

 

             Hematocrit   44.4 %                    37.0 - 51.0   

 

             MCV          88.5 fL                   80.0 - 97.0   

 

             MCH          29.4 pg                   26.0 - 32.0   

 

             MCHC         33.3 g/dL                 31.0 - 38.0   

 

             RDW          14.3 %       High         11.6 - 13.7   

 

             PLT          208 x10*3/UL              140 - 440     

 

             MPV          8.8 FL                    7.8 - 11.0    

 

             Lymph %      16.7 %                    10.0 - 58.5   

 

             Mid %        4.6 %                     1.7 - 9.3     

 

             Neut %       78.7 %                    37.0 - 92.0   

 

             Lymph #      1.2 x10*3/UL              0.6 - 4.1     

 

             Mid #        0.4 x10*3/UL              0.1 - 0.6     

 

             Neut #       5.8 x10*3/UL              2.0 - 7.8     

 

                    Basic Metabolic Panel 09/10/2020          [facility]

[facility]

: Dr Haris Telles

[place] [address] [phone] Glucose    100 mg/dL  High       74 - 99    5

 

             BUN          24 mg/dL     High         7 - 18        

 

             Creatinine   1.4 mg/dL    High         0.6 - 1.3     

 

             Sodium       139 mEq/L                 136 - 145     

 

             Potassium    4.3 mEq/L                 3.5 - 5.1     

 

             Chloride     99 mEq/L                  98 - 107      

 

             Carbon Dioxide 36 mEq/L     High         21 - 32       

 

             Calcium      9.9 mg/dL                 8.5 - 10.1    

 

             GFR  36 mL/min    Low          >60           

 

             GFR African American 44 mL/min    Low          >60          6

 

                    Laboratory test finding 09/10/2020          [facility]

[facility]

: Dr Haris Telles

[place] [address] [phone] Thyroid Stimulating Hormone 3.19 uIU/mL            0.3

6 - 3.74  

 

                    Laboratory test finding 2020          [facility]

             Inr          3.1                                     

 

                    Laboratory test finding 2020          [facility]

             Inr          2.5                                     

 

                    Complete Blood Count 06/10/2020          [facility]

s, pc

: Dr Haris Telles

[place] [address] [phone] WBC        8.1 x10*3/UL            4.1 - 10.9  

 

             RBC          5.07 x10*6/UL              4.20 - 6.30   

 

             Hemoglobin   14.8 g/dL                 12.0 - 18.0   

 

             Hematocrit   44.9 %                    37.0 - 51.0   

 

             MCV          88.5 fL                   80.0 - 97.0   

 

             MCH          29.3 pg                   26.0 - 32.0   

 

             MCHC         33.1 g/dL                 31.0 - 38.0   

 

             RDW          14.2 %       High         11.6 - 13.7   

 

             PLT          206 x10*3/UL              140 - 440     

 

             MPV          8.7 FL                    7.8 - 11.0    

 

             Lymph %      16.9 %                    10.0 - 58.5   

 

             Mid %        4.7 %                     1.7 - 9.3     

 

             Neut %       78.4 %                    37.0 - 92.0   

 

             Lymph #      1.3 x10*3/UL              0.6 - 4.1     

 

             Mid #        0.5 x10*3/UL              0.1 - 0.6     

 

             Neut #       6.3 x10*3/UL              2.0 - 7.8     

 

                    Basic Metabolic Panel 06/10/2020          [facility]

[facility]

: Dr Haris Telles

[place] [address] [phone] Glucose    76 mg/dL              74 - 99    7

 

             BUN          22 mg/dL     High         7 - 18        

 

             Creatinine   1.2 mg/dL                 0.6 - 1.3     

 

             Sodium       144 mEq/L                 136 - 145     

 

             Potassium    4.2 mEq/L                 3.5 - 5.1     

 

             Chloride     104 mEq/L                 98 - 107      

 

             Carbon Dioxide 33 mEq/L     High         21 - 32       

 

             Calcium      9.7 mg/dL                 8.5 - 10.1    

 

             GFR  43 mL/min    Low          >60           

 

             GFR African American 52 mL/min    Low          >60          8

 

                    Laboratory test finding 06/10/2020          [facility]

: Dr Haris Telles

[place] [address] [phone] Magnesium  2.1 mg/dL             1.8 - 2.4   







                          1                         THERAPUTIC HUMAN INR VALUES

INDICATIONS                      NORMAL RANGES

PROPHYLAXIS/TREATMENT OF:

VENOUS THROMBOSIS                2.0-3.0

PULMONARY EMBOLISM               2.0-3.0

PREVENTION OF SYSTEMIC EMBOLISM FROM:

TISSUE HEART VALVES              2.0-3.0

ACUTE MYOCARDIAL INFARCTION      2.0-3.0

VALVULAR HEART DISEASE           2.0-3.0

ATRIAL FIBRILLATION              2.0-3.0

MECHANICAL VALVES(HIGH RISK)     2.5-3.5

RECURRENT MYOCARDIAL INFARCTION  2.5-3.5



 

                          2                         Units are mL/min/1.73 m2



Chronic Kidney Disease Staging per NKF:



Stage I & II   GFR >=60       Normal to Mildly Decreased

Stage III      GFR 30-59      Moderately Decreased

Stage IV       GFR 15-29      Severely Decreased

Stage V        GFR <15        Very Little GFR Left

ESRD           GFR <15 on RRT



 

                          3                         DIAGNOSIS CRITERIA

MMB ng/ml       Relative Index (RI)

NON-AMI               < or = 5               N/A

GRAY ZONE              > 5                < or = 4

AMI                    > 5                   > 4



 

                          4                         Troponin I Reference Interva

l for [employer] Vista LOCI:



                                        99th Percentile= 0.00-0.045 ng/ml



Risk Stratification:

<= 0.10 ng/ml   Decreased Risk for Adverse Clinical

Events.

                                        0.10-1.50 ng/ml   Increased Risk for Adv

erse Clinical

Events. Evaluation of additional

criterion and/or repeat testing in 2-6

hours is suggested to rule out myocardial

damage.

>= 1.50 ng/ml   Indicative of Myocardial Injury.



 

                          5                         100-125 mg/dL     PRE-DIABET

ES/FASTING

>126 mg/dL          DIABETES/FASTING



 

                          6                         CHRONIC KIDNEY DISEASE STAGI

NG PER NKF



STAGE I & II      GFR >= 60        NORMAL TO MILDLY DECREASED

STAGE III          GFR 30-59          MODERATELY DECREASED

STAGE IV           GFR 15-29         SEVERELY DECREASED

STAGE V            GFR <15            VERY LITTLE GFR LEFT

ESRD                 GFR <15            ON RRT



 

                          7                         100-125 mg/dL     PRE-DIABET

ES/FASTING

>126 mg/dL          DIABETES/FASTING



 

                          8                         CHRONIC KIDNEY DISEASE STAGI

NG PER NKF



STAGE I & II      GFR >= 60        NORMAL TO MILDLY DECREASED

STAGE III          GFR 30-59          MODERATELY DECREASED

STAGE IV           GFR 15-29         SEVERELY DECREASED

STAGE V            GFR <15            VERY LITTLE GFR LEFT

ESRD                 GFR <15            ON RRT









Procedures





                Date            Code            Description     Status

 

                2018      [id number]       Diabetic Foot Exam Completed

 

                2017      [id number]        Mammogram       Completed

 

                01/15/2016      [id number]        Mammogram       Completed

 

                2015      [id number]        Mammogram       Completed

 

                2014      [id number]        Mammogram       Completed

 

                2013      [id number]       Diabetic Retinal Eye Exam Comple

zeke

 

                2013      [id number]       Bone Mineral Density Test Comple

zeke

 

                01/10/2013      [id number]        Mammogram       Completed

 

                2012      [id number]        Mammogram       Completed

 

                06/10/2011      [id number]       Bone Mineral Density Test Comple

zeke

 

                2011      [id number]        Mammogram       Completed

 

                2009      [id number]        Mammogram       Completed

 

                2008      [id number]       Bone Mineral Density Test Comple

zeke

 

                10/22/2004      [id number]        Colonoscopy     Completed







Medical Devices





                                        Description

 

                                        No Information Available







Encounters





           Type       Date       Location   Provider   Dx         Diagnosis

 

             Office Visit 10/29/2020 11:20a [facility] Екатерина Lemus

ne, FNP 

K57.92                                  Dvtrcli of intest, part unsp, w/o perf o

r abscess w/o bleed

 

                          I50.41                    Acute combined systolic and 

diastolic (congestive) hrt fail

 

                          I13.0                     Hyp hrt & chr kdny dis w hrt

 fail and stg 1-4/unsp chr kdny

 

                          N18.31                    Chronic kidney disease, stag

e 3a

 

                          J44.9                     Chronic obstructive pulmonar

y disease, unspecified

 

                          I48.21                    Permanent atrial fibrillatio

n

 

                          Z95.2                     Presence of prosthetic heart

 valve

 

                          Z79.01                    Long term (current) use of a

nticoagulants

 

                          R26.89                    Other abnormalities of gait 

and mobility

 

             Office Visit 09/10/2020  1:40p [facility] Екатерина Lemus

ne, FNP I13.0

                                        Hyp hrt & chr kdny dis w hrt fail and st

g 1-4/unsp chr kdny

 

                          I50.42                    Chronic combined systolic an

d diastolic hrt fail

 

                          N18.3                     Chronic kidney disease, stag

e 3 (moderate)

 

                          J44.9                     Chronic obstructive pulmonar

y disease, unspecified

 

                          G30.1                     Alzheimer's disease with lat

e onset

 

                          F02.80                    Dementia in oth diseases [facility] elswhr w/o behavrl disturb

 

                          I48.21                    Permanent atrial fibrillatio

n

 

                          Z79.01                    Long term (current) use of a

nticoagulants

 

                          Z95.2                     Presence of prosthetic heart

 valve

 

                          I87.2                     Venous insufficiency (chroni

c) (peripheral)

 

             Office Visit 06/10/2020  2:00p [facility] Екатерина Menendez, [facility] I13.0

                                        Hyp hrt & chr kdny dis w hrt fail and st

g 1-4/unsp chr kdny

 

                          I50.42                    Chronic combined systolic an

d diastolic hrt fail

 

                          N18.3                     Chronic kidney disease, stag

e 3 (moderate)

 

                          J44.9                     Chronic obstructive pulmonar

y disease, unspecified

 

                          G30.1                     Alzheimer's disease with lat

e onset

 

                          F02.80                    Dementia in oth diseases [facility] elswhr w/o behavrl disturb

 

                          I48.21                    Permanent atrial fibrillatio

n

 

                          Z79.01                    Long term (current) use of a

nticoagulants

 

                          Z95.2                     Presence of prosthetic heart

 valve

 

                          Z13.89                    Encounter for screening for 

other disorder







Assessments





                Date            Code            Description     Provider

 

                    10/29/2020          K57.92              Diverticulitis of in

testine, part unspecified, without 

perforation or abscess without bleeding Екатерина Nguyen [facility]

 

                    10/29/2020          I50.41              Acute combined systo

lic (congestive) and diastolic 

(congestive) heart failure              CLARISSA Bajwa

 

                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

idney disease with heart lakshmi 

Екатерина Nguyen [facility]

 

                10/29/2020      N18.31          Chronic kidney disease, stage 3a

 DIOGO Bajwa

 

                10/29/2020      J44.9           Chronic obstructive pulmonary di

sease, unspecified Екатерина Nguyen

[facility]

 

                10/29/2020      I48.21          Permanent atrial fibrillation An

n DIOGO Nguyen

 

                10/29/2020      Z95.2           Presence of prosthetic heart zakia

ve Екатерина Nguyen [facility]

 

                10/29/2020      Z79.01          Long term (current) use of antic

oagulants CLARISSA Bajwa

 

                10/29/2020      R26.89          Other abnormalities of gait and 

mobility CLARISSA Bajwa

 

                10/15/2020      J44.9           Chronic obstructive pulmonary di

sease, unspecified Carballo F. 

Elfego,MD

 

                10/15/2020      I48.21          Permanent atrial fibrillation Co

amanda Telles MD

 

                10/15/2020      I10             Essential (primary) hypertension

 Haris Telles MD

 

                10/15/2020      G30.1           Alzheimer's disease with late on

set Haris Telles MD

 

                2020      J44.9           Chronic obstructive pulmonary di

sease, unspecified Haris Telles MD

 

                2020      G30.1           Alzheimer's disease with late on

set Haris Telles MD

 

                2020      I48.21          Permanent atrial fibrillation Co

amanda Telles MD

 

                2020      I10             Essential (primary) hypertension

 Haris Telles MD

 

                09/10/2020      I13.0           Hypertensive heart and chronic k

idney disease with heart lakshmi 

Екатерина Nguyen, [facility]

 

                    09/10/2020          I50.42              Chronic combined sys

tolic (congestive) and diastolic 

(congestive) heart failure              Екатерина Nguyen [facility]

 

                09/10/2020      N18.3           Chronic kidney disease, stage 3 

(moderate) Екатерина Nguyen [facility]

 

                09/10/2020      J44.9           Chronic obstructive pulmonary di

sease, unspecified Екатерина Nguyen

[facility]

 

                09/10/2020      G30.1           Alzheimer's disease with late on

set Екатерина Nguyen [facility]

 

                    09/10/2020          F02.80              Dementia in other di

seases classified elsewhere without 

behavioral disturbance                  Екатерина Nguyen [facility]

 

                09/10/2020      I48.21          Permanent atrial fibrillation An

marino Nguyen [facility]

 

                09/10/2020      Z79.01          Long term (current) use of antic

oagulants Екатерина Nguyen [facility]

 

                09/10/2020      Z95.2           Presence of prosthetic heart zakia

ve Екатерина Nguyen [facility]

 

                09/10/2020      I87.2           Venous insufficiency (chronic) (

peripheral) Екатерина Nguyen [facility]

 

                2020      Z11.1           Encounter for screening for resp

iratory tuberculosis Haris Telles MD

 

                2020      I48.21          Permanent atrial fibrillation An

marino Ngyuen [facility]

 

                2020      I48.21          Permanent atrial fibrillation Pr

otime

 

                2020      Z51.81          Encounter for therapeutic drug l

evel monitoring Екатерина Nguyen 

[facility]

 

                2020      Z51.81          Encounter for therapeutic drug l

evel monitoring Protime

 

                2020      Z79.01          Long term (current) use of antic

oagulants Екатерина Nguyen, [facility]

 

                2020      Z79.01          Long term (current) use of antic

oagulants Protime

 

                2020      I48.21          Permanent atrial fibrillation An

n Radha Goncalves, [facility]

 

                2020      I48.21          Permanent atrial fibrillation Pr

otime

 

                2020      Z51.81          Encounter for therapeutic drug l

evel monitoring Екатерина Radha Sacha, 

[facility]

 

                2020      Z79.01          Long term (current) use of antic

oagulants Екатерина Nguyen, [facility]

 

                2020      I10             Essential (primary) hypertension

 Haris Telles MD

 

                2020      N18.3           Chronic kidney disease, stage 3 

(moderate) Haris Telles MD

 

                2020      J44.9           Chronic obstructive pulmonary di

sease, unspecified Haris Telles MD

 

                2020      G30.1           Alzheimer's disease with late on

set Haris Telles MD

 

                2020      N18.3           Chronic kidney disease, stage 3 

(moderate) Haris Telles MD

 

                2020      J44.9           Chronic obstructive pulmonary di

sease, unspecified Haris Telles MD

 

                2020      G30.1           Alzheimer's disease with late on

set Haris Telles MD

 

                2020      I10             Essential (primary) hypertension

 Haris Telles MD

 

                06/10/2020      I13.0           Hypertensive heart and chronic k

idney disease with heart lakshmi 

Екатерина Nguyen, [facility]

 

                    06/10/2020          I50.42              Chronic combined sys

tolic (congestive) and diastolic 

(congestive) heart failure              Екатерина Nguyen, [facility]

 

                06/10/2020      N18.3           Chronic kidney disease, stage 3 

(moderate) Екатерина Nguyen [facility]

 

                06/10/2020      J44.9           Chronic obstructive pulmonary di

sease, unspecified Екатерина Nguyen

[facility]

 

                06/10/2020      G30.1           Alzheimer's disease with late on

set Екатерина Nguyen, [facility]

 

                    06/10/2020          F02.80              Dementia in other di

seases classified elsewhere without 

behavioral disturbance                  Екатерина Nguyen [facility]

 

                06/10/2020      I48.21          Permanent atrial fibrillation An

n Radha Goncalves, [facility]

 

                06/10/2020      Z79.01          Long term (current) use of antic

oagulants Екатерина Radha Goncalves [facility]

 

                06/10/2020      Z95.2           Presence of prosthetic heart zakia

ve Екатерина Garcia Sacha, [facility]

 

                06/10/2020      Z13.89          Encounter for screening for othe

r disorder CLARISSA Bajwa

 

                2020      N18.3           Chronic kidney disease, stage 3 

(moderate) Haris Telles MD

 

                2020      J44.9           Chronic obstructive pulmonary di

sease, unspecified Haris Telles MD

 

                2020      I10             Essential (primary) hypertension

 Haris Telles MD

 

                2020      E66.3           Overweight      Haris srinivasan MD







Plan of Treatment

Future Appointment(s):* 2020  3:20 pm - CLARISSA Bajwa at [facility]

10/29/2020 - CLARISSA Bajwa* K57.92 Diverticulitis of intestine, part 
  unspecified, without perforation or abscess without bleeding

* I50.41 Acute combined systolic (congestive) and diastolic (congestive) heart 
  failure* New Orders:* CBC and CMP, Scheduled: 20



* Comments:* will increase Torsemide to 100mg daily.will arrange for [facility] to evaluate.





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
  * Blood pressure is controlled on current treatment plan.





* N18.31 Chronic kidney disease, stage 3a

* J44.9 Chronic obstructive pulmonary disease, unspecified* Comments:* will 
  request BMP in one week.





* I48.21 Permanent atrial fibrillation* Comments:* Rate controlled.





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

* Z79.01 Long term (current) use of anticoagulants* Comments:* INR completed at 
  home.  Has been supratherapeutic due to interaction of Warfarin and 
  Metronidazole.  Verbal  instructions given. Signs and symptoms to report re
  viewed.  No evidence of thromboembolic or bleeding events.





* R26.89 Other abnormalities of gait and mobility* New Orders:* Physical 
  Therapy, Ordered: 10/29/20



* Comments:* will request home physical therapy.





* All * Comments:* Will need MARITZA.Tried to discuss [facility] and advance 
  directives.Booklet "hard choices for loving people" given.









Functional Status





                                        Description

 

                                        No Information Available







Mental Status





                                        Description

 

                                        No Information Available







Referrals





                                        Description

 

                                        No Information Available

## 2021-01-22 NOTE — CCD
Continuity of Care Document

                             Created on: 2020



Xena Basilio

External Reference #: [id number]

: 1935

Sex: Female



Demographics





                          Address                   [address] [place] [address]

 

                          Preferred Language        English

 

                          Marital Status            Unknown

 

                          Muslim Affiliation     Unknown

 

                          Race                      Unknown

 

                          Ethnic Group              Unknown





Author





                          Author                    [employer]

d

 

                          Organization              Unknown

 

                          Address                   Unknown

 

                          Phone                     Unavailable







Care Team Providers





                    Care Team Member Name Role                Phone

 

                    Haris Telles    Unavailable          [phone]

 

                          Unavailable               Unavailable

 

                    Haris Telles    Unavailable          [phone]

 

                          Unavailable               Unavailable

 

                    Karolina Young       Unavailable          [phone]

 

                    Belkis Mason       Unavailable          [phone]



                                                  



Problems

                



                    Name                Dates               Details

 

                                                             Encephalopathy, uns

pecified          (G93.40)

                          20-Oct-2020               Status: Active

 

                                                             Presence of prosthe

tic heart valve          (Z95.2)

                          20-Oct-2020               Status: Active

 

                                                             Presence of cardiac

 pacemaker          (Z95.0)

                          20-Oct-2020               Status: Active

 

                                                             History of falling 

         (Z91.81)

                          20-Oct-2020               Status: Active

 

                                                             Long term (current)

 use of inhaled steroids          

(Z79.51)

                          20-Oct-2020               Status: Active

 

                                                             Long term (current)

 use of anticoagulants          (Z79.01)

                          20-Oct-2020               Status: Active

 

                                                             Encounter for thera

peutic drug level monitoring          

(Z51.81)

                          20-Oct-2020               Status: Active

 

                                                             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

rosis without current pathological 

fracture          (M81.0)

                          20-Oct-2020               Status: Active

 

                                                             Unspecified osteoar

thritis, unspecified site          

(M19.90)

                          20-Oct-2020               Status: Active

 

                                                             Permanent atrial fi

brillation          (I48.21)

                          20-Oct-2020               Status: Active

 

                                                             Dementia in other d

iseases classified elsewhere without 

behavioral disturbance          (F02.80)

                          20-Oct-2020               Status: Active

 

                                                             Alzheimer's disease

, unspecified          (G30.9)

                          20-Oct-2020               Status: Active

 

                                                             Chronic kidney dise

ase, stage 3a          (N18.31)

                          20-Oct-2020               Status: Active

 

                                                             Acute combined syst

olic (congestive) and diastolic 

(congestive) heart failure          (I50.41)

                          20-Oct-2020               Status: Active

 

                                                             Hypertensive heart 

and chronic kidney disease with heart 

failure and stage 1 through stage 4 chronic kidney disease, or unspecified 
chronic kidney disease          (I13.0)

                          20-Oct-2020               Status: Active

 

                                                             Diverticulitis of i

ntestine, part unspecified, without 

perforation or abscess without bleeding          (K57.92)

                          20-Oct-2020               Status: Active

 

                                                             Chronic obstructive

 pulmonary disease with (acute) 

exacerbation          (J44.1)

                          03-Oct-2020               Status: Active



                                                                                
                                                                                
                                                                       



Medications

                



                    Name                Dates               Details

 

                                        Spironolactone 25 MG

 

Haris Telles MD 

 







Active

Xopenex 0.63 MG/3ML

every 6 hours as needed for wheezing or shortness of breath

Bryan Telles MD 



                                         Start : 2020





Active

Biaxin 500 MG

1 tab 1 hour prior to dental procedures

Bryan Telles MD 



                                         Start : 2020





Active

Ipratropium-Albuterol 0.5-2.5 (3) MG/3ML

every 6 hours as needed for shortness of breath

Bryan Telles MD 



                                         Start : 2020





Active

Klor-Con M10 10 MEQ



Bryan Telles MD 



                                         Start : 2020





Active

Colace 



Bryan Telles MD 



                                         Start : 2020





Active

Advair Diskus 100-50 MCG/DOSE

one puff by dulce maria twice a day

Bryan Telles MD 



                                         Start : 2020





Active

Vitamin D 



Bryan Telles MD 



                                         Start : 2020





Active

Incruse Ellipta 62.5 MCG/INH



Bryan Telles MD 



                                         Start : 2020





Active

Levothyroxine 



Bryan Telles MD 



                                         Start : 2020





Active

Aricept 5 MG



Bryan Telles MD 



                                         Start : 2020





Active

Calcium 



Bryan Telles MD 



                                         Start : 2020





Active

Torsemide 100 MG



Bryan Telles MD 



                                         Start : 2020





Active

Tylenol 

2 by mouth everyday as needed for pain >5, no more than 3,000 mg/day.

Bryan Telles MD 



                                         Start : 2020





Active

Albuterol Sulfate (2.5 MG/3ML) 0.083%

via nebulizer for SOB, no more than 4x/day

Bryan Telles MD 



                                         Start : 2020





Active

Coumadin 3 MG



Bryan Telles MD 



                                         Start : 2020





Active

Simvastatin 40 MG



Bryan Telles MD 



                                         Start : 2020





Active

Multivitamin 



Bryan Telles MD 



                                         Start : 2020





Active

Spironolactone 25 MG



Bryan Telles MD 



                           Start : 2018       End : 2018





Inactive

Tylenol 325 MG

2 tablets Q6hrs as needed for pain. Max daily dose: 3000mg 

Bryan Telles MD 



                           Start : 2018       End : 2018





Inactive

Torsemide 100 MG

0800 and noon 

Bryan Telles MD 



                           Start : 2018       End : 2018





Inactive

Simvastatin 40 MG



Bryan Telles MD 



                           Start : 2018       End : 2018





Inactive

Advair Diskus 100-50 MCG/DOSE



Bryan Telles MD 



                           Start : 2018       End : 2018





Inactive

Calcium 500+D 500-200 MG-UNIT



Bryan Telles MD 



                           Start : 2018       End : 2018





Inactive

Albuterol Sulfate (2.5 MG/3ML) 0.083%



Bryan Telles MD 



                           Start : 2018       End : 2018





Inactive

Albuterol Sulfate  (90 Base) MCG/ACT

2 puffs four times a day as needed for SOB

Bryan Telles MD 



                           Start : 2018       End : 2018





Inactive

Coumadin 3 MG



Bryan Telles MD 



                           Start : 2018       End : 2018





Inactive

Mucinex 600 MG



Bryan Telles MD 



                           Start : 2018       End : 2018





Inactive

predniSONE 10 MG

3 tablets for 3 days, 2 tablets for 3 days, 1 tablet for 3 days then stop.

Bryan Telles MD 



                           Start : 2018       End : 2018





Inactive

Colace 100 MG

daily as needed for constipation

Bryan Telles MD 



                           Start : 2018       End : 2018





Inactive

Vitamin D3 5000 UNIT



Bryan Telles MD 



                           Start : 2018       End : 2018





Inactive

Potassium Chloride Makenzie ER 20 MEQ

2 tablets twice a day

Bryan Telles MD 



                           Start : 2018       End : 2018





Inactive

Multivitamin Adults 



Bryan Telles MD 



                           Start : 2018       End : 2018





Inactive

Synthroid 25 MCG



Bryan Telles MD 



                           Start : 2018       End : 2018





Inactive

Aricept 5 MG



Bryan Telles MD 



                           Start : 2018       End : 2018





Inactive

                                                                                
                                                                                
                                                                                
                                                                                
                                                                                
        



Allergies and Adverse Reactions

          



                    Name                Dates               Details

 

                                         chlorpromazine (Allergy) Onset:    Status:  

Active 



 

                                         codeine (Allergy) Onset:  2020  

Status:  Active 



 

                                         Penicillin (Allergy) Onset:  

0  Status:  Active 





                                                                                
       



Results

                



                Date            Description     Value           Details

 

                                                                                

            

                                                  No Known Results              

 

                                                                

 

                                                                         



                                                                         



Plan of Care

                



                    Name                Dates               Details

 

                                        Instructions         

 

                                                  Diet:Regular Diet             

        

                                                                             Ins

truction Type: 

          Nutrition education                               

                           



                                                                         



Payers

                * Medicare - [facility]

* [facility]

## 2021-01-22 NOTE — CCD
Continuity of Care Document (CCD)

                             Created on: 2020



Xena Basilio

External Reference #: [id number]

: 1935

Sex: Female



Demographics





                          Address                   [address]

[place]  [address]

 

                          Home Phone                [phone]

 

                          Preferred Language        Unknown

 

                          Marital Status            

 

                          Confucianist Affiliation     Unknown

 

                          Race                      White

 

                          Ethnic Group              Not  or 





Author





                          Author                    Xena Bajwa

 

                          Organization              Unknown

 

                          Address                   [address] [facility] [address]

[place]  [address]



 

                          Phone                     [phone]







Care Team Providers





                    Care Team Member Name Role                Phone

 

                    Haris Telles JR, MD AUTM                Unavailable

 

                    Tiny Arnett  AUTM                [phone]

 

                    Kandy Rosas MD    AUTM                [phone]

 

                    Ronan Romero MD AUTM                [phone]

 

                    Екатерина Ward   AUTM                [phone]

 

                    [place] [facility]  AUTM                [phone]







Problems





                    Active Problems     Provider            Date

 

                    Anticoagulants Long Term (Current) Use                     O

nset: 1997

 

                    Atrial fibrillation                     Onset: 1997

 

                    Contact dermatitis  CLARISSA Bajwa    Onset: 2011

 

                    Heart valve replacement CLARISSA Bajwa    Onset: 

3

 

                    Mitral valve disorder CLARISSA Bajwa    Onset: 2013

 

                    Essential hypertension JOON Robin Onset: 

3

 

                    Anticoagulant agent CLARISSA Bajwa    Onset: 07/15/2015

 

                    Chronic atrial fibrillation Haris Telles MD Onset: 

 

                    Hypothyroidism      Haris Telles MD Onset: 10/04/2017







Social History





                Type            Date            Description     Comments

 

                Birth Sex                       Unknown          

 

                Tobacco Use     Start: Unknown End: Unknown Patient is a former 

smoker  

 

                Exercise Type/Frequency                 Exercises rarely  







Allergies, Adverse Reactions, Alerts





             Active Allergies Reaction     Severity     Comments     Date

 

             Codeine,PCN                                         2010

 

             Thorazine                                           2016







Medications





           Active Medications SIG        Qnty       Indications Ordering Provide

r Date

 

                          Xopenex                     0.63mg/3ML Nebulizer      

             use four 

times daily prn for wheezing and shortness of breath. DX J44.9 36ml             

                       Екатерина Nguyen [facility]                               10/26/2020

 

                                        Culturelle Digestive Health Probiotic   

                   Capsules             

                one twice daily while on antibiotics or if having diarrhea 30cap

s                          Екатерина Nguyen [facility]                               10/23/2020

 

                                        Vitamin D3 Ultra Strength               

      125mcg (5000 Ut) Capsules         

             1 by mouth every day 90caps                    Екатерина Nguyen [facility] 

 

                          Nebulizer Kit/Tubing/Mouthpiece                      K

it                   for 

use with nebulizer--use up to four times a day dx j44.9 1units                  

                Екатерина Nguyen 

[facility]                                     2019

 

                          Incruse Ellipta                     62.5mcg/Inh Aeroso

l                   inhale

one puff by mouth daily 3units                          Екатерина Nguyen [facility] 01/15/2

019

 

                          Levothyroxine Sodium                     25mcg Tablets

                   1 

tablet by mouth every day 90tabs                          Екатерина Nguyen [facility] 10/04

/2017

 

                          Aricept                     5mg Tablets               

    1 by mouth every day 

at bedtime      90tabs          G30.1           Sravan Franks M.D. 10/03/2017

 

                          Advair Diskus                     100-50mcg/Dose Aeros

ol                   

inhale one puff by mouth twice a day 180units                        Екатерина Nguyen [facility] 2017

 

                          Colace                     100mg Capsules             

      1 by mouth twice a 

day             180caps         K59.00          Екатерина Nguyen [facility] 2017

 

                          Calcium 500+D                     500-200mg-Unit Table

ts                   1 by 

mouth three times a day 270tabs                         Haris Telles MD 

 

                          Duoneb                     0.5-2.5(3)mg/3ML Solution  

                 inhale 

the contents of one vial via nebulizer four times a day as needed for wheezing 
or shortness of breath 270ml           R06.02          Екатерина Nguyen [facility] 05/15/20

17

 

                          Torsemide                     100mg Tablets           

        1tablet by mouth 

daily           30tabs          R60.0           Екатерина Nguyen [facility] 05/15/2017

 

                          Tylenol                     325mg Capsules            

       2 by mouth every 

day as needed   180caps                         Екатерина Nguyen [facility] 2017

 

                                        Albuterol Sulfate                     (2

.5mg/3ML) 0.083% Nebulizer              

             q.i.d. prn via nebulizer dx J44.9 75ml                      Екатерина Nguyen [facility] 2016

 

                          Coumadin                     3mg Tablets              

     take 1 tablet by 

mouth once a day as directed 90tabs                          Екатерина Nguyen, [facility] 

 

                          Simvastatin                     40mg Tablets          

         take one tablet 

by mouth at bedtime 90tabs                          Екатерина Nguyen [facility] 2015

 

                    Multi-Vitamins                      Tablets                 

  1 by mouth daily    

90tabs                                  Екатерина Nguyen, [facility]    2003

 

                          Spironolactone                     25mg Tablets       

            1 by mouth 

every day                                       Unknown         

 

                          Klor-Con M10                     10Meq Tablets ER     

              2 by mouth 

every day                                       Unknown         

 

                          Losartan Potassium                     25mg Tablets   

                1 by mouth

every day                                       Unknown         

 

                          Ciprofloxacin HCL                     500mg Tablets   

                1 tab by 

mouth twice daily                                 Unknown         

 

                          Metronidazole                     500mg Tablets       

            one by mouth 

three times a day for 10 days                                 Unknown         







Medications Administered in Office





           Medication SIG        Qnty       Indications Ordering Provider Date

 

                          Administration Of Flu Vaccine                      Inj

ection                    

                                                Екатерина Nguyen, [facility] 2019

 

                                        Immunization Adminstration,1 Vaccine/Tox

oid                      Injection      

                                                    [facility]

 

                          Administration Of Flu Vaccine                      Inj

ection                    

                                                Екатерина Nguyen, [facility] 10/19/2018

 

                          Administration Of Flu Vaccine                      Inj

ection                    

                                                Kelsea Adams, [facility] 10/03/2017

 

                          Administration Of Flu Vaccine                      Inj

ection                    

                                                Kelsea Adams, [facility] 10/13/2016

 

                          Administration Of Flu Vaccine                      Inj

ection                    

                                                Haris Telles MD 10/15/2015

 

                          Administration Of Flu Vaccine                      Inj

nonaion                    

                                                Haris Telles MD 10/23/2014

 

                          Administration Of Flu Vaccine                      Inj

bruce Telles MD 10/12/2012

 

                          Administration Of Flu Vaccine                      Inj

nonaion                    

                                                Haris Telles MD 10/11/2011

 

                          Administration Of Flu Vaccine                      Inj

nonaion                    

                                                Haris Telles MD 10/07/2010

 

                          Administration Of Flu Vaccine                      Inj

nonaion                    

                                                Haris Telles MD 10/08/2009

 

                          Administration Of Flu Vaccine                      Inj

ection                    

                                                Haris Telles MD 2008

 

                          Administration Of Flu Vaccine                      Inj

ection                    

                                                Haris Telles MD 10/18/2007

 

                Administration Of Zostavax                      Injection       

                                            

                          Haris Telles MD     2007

 

                          Administration Of Flu Vaccine                      Inj

ection                    

                                                JOON Robin 2006

 

                          Administration Of Flu Vaccine                      Inj

ection                    

                                                Haris Telles MD 10/04/2005

 

                          Administration Of Flu Vaccine                      Inj

ection                    

                                                CLARISSA Bajwa 10/19/2004

 

                          Administration Of Flu Vaccine                      Inj

ection                    

                                                Haris Telles MD 2003

 

                          Administration Of Flu Vaccine                      Inj

ection                    

                                                Екатерина [place], FNP 10/08/2002







Immunizations





             CPT Code     Status       Date         Vaccine      Lot #

 

             U-Flu        Given        10/05/2020   Influenza,Unspecified  

 

             23051        Given        2020   PPD          [id number]

 

                60996           Given           2019      Influenza Vaccin

e Quadrivalent Preser/Antibiotic Free Im 

Use                                     [id number]

 

             65709        Given        2019   Adacel- Tetanus Diphtheria P

ertussis (Age63 & Under) 

[id number]

 

             58959        Given        2019   Shingrix      

 

             36606        Given        2018   Shingrix      

 

                92683           Given           10/19/2018      Influenza Virus 

Vaccine, Quadrivalent (Cciiv4), Derived 

From Cell                               [id number]

 

                18024           Given           10/03/2017      Influenza Vaccin

e Quadrivalent Preser/Antibiotic Free Im 

Use                                     [id number]

 

                     Given        10/13/2016   Fluvirin Virus Vaccine 74574

01

 

                     Given        10/15/2015   Fluvirin Virus Vaccine 61125

01

 

             92782        Given        2015   Prevnar 13   [id number]

 

                     Given        10/23/2014   Fluvirin Virus Vaccine 90121

21

 

                     Given        10/12/2012   Fluvirin Virus Vaccine 84637

01

 

                     Given        10/11/2011   Fluvirin Virus Vaccine  

 

                     Given        10/11/2011   Fluvirin Virus Vaccine  

 

             90278        Given        10/07/2010   Influenza Virus Vaccine  

 

             40604        Given        2009   Pneumovax 23  

 

             45059        Given        10/08/2009   Influenza Virus Vaccine  

 

             97731        Given        2008   Influenza Virus Vaccine  

 

             98293        Given        10/18/2007   Influenza Virus Vaccine  

 

             84438        Given        2007   Zoster Vaccine  

 

             18104        Given        2006   Influenza Virus Vaccine  

 

             43763        Given        10/04/2005   Influenza Virus Vaccine  

 

             91169        Given        10/19/2004   Influenza Virus Vaccine  

 

             90543        Given        2003   Influenza Virus Vaccine  

 

             15261        Given        10/08/2002   Influenza Virus Vaccine  

 

             34455        Given        2001   Influenza Virus Vaccine  

 

             61283        Given        2001   Tetanus Toxoid  

 

             U-Pneum      Given        10/01/2000   Pneumococcal,Unspecified  

 

             47448        Given        10/21/1999   Influenza Virus Vaccine  

 

             62390        Given        10/25/1994   Influenza Virus Vaccine  







Vital Signs





                Date            Vital           Result          Comment

 

                10/29/2020 11:21am Heart Rate      82 /min          

 

                    Weight              163.00 lb            

 

                    O2 % BldC Oximetry  94 %                 

 

                09/10/2020  1:40pm BP Systolic     130 mmHg         

 

                    BP Diastolic        50 mmHg              

 

                    Heart Rate          56 /min              

 

                    Body Temperature    98.4 F             

 

                    Respiratory Rate    14 /min              

 

                    Height              65.5 inches         5'5.50"

 

                    Weight              147.00 lb            

 

                    O2 % BldC Oximetry  95 %                 

 

                    BMI (Body Mass Index) 24.1 kg/m2           







Results





        Test    Acquired Date Facility Test    Result  H/L     Range   Note

 

                    Complete Blood Count 2020          [facility]

enter

                                        [address] [place] [address] [phone] White Blood Count 7.8 10     Normal     4.0-10.0    

 

             Red Blood Count 4.46 10      Normal       4.00-5.40     

 

             Hemoglobin   13.3 g/dL    Normal       12.0-15.5     

 

             Hematocrit   41.6 %       Normal       36.0-47.0     

 

             Mean Corpuscular Volume 93.3 fl      Normal       80.0-96.0     

 

             Mean Corpuscular Hemoglobin 29.8 pg      Normal       27.0-33.0    

 

 

             Mean Corpuscular HGB Conc 32.0 g/dL    Normal       32.0-36.5     

 

             Red Cell Distribution Width 15.9 %       High         11.5-14.5    

 

 

             Platelet Count, Automated 231 10       Normal       150-450       

 

             Nucleated Red Blood Cell % 0.0 %        Normal       0-0           

 

                    Comprehensive Metabolic Profil 2020          [address] [place] [address] [phone] Glucose, Fasting 99 mg/dL   Normal           

 

             Blood Urea Nitrogen 21 mg/dL     High         7-18          

 

             Creatinine For GFR 1.07 mg/dL   Normal       0.55-1.30     

 

             Glomerular Filtration Rate 52.1         Normal       >32          1

 

             Sodium Level 136 mEq/L    Normal       136-145       

 

             Potassium Serum 4.2 mEq/L    Normal       3.5-5.1       

 

             Chloride Level 95 mEq/L     Low                  

 

             Carbon Dioxide Level 35 mEq/L     High         21-32         

 

             Anion Gap    6 mEq/L      Low          8-16          

 

             Calcium Level 10.1 mg/dL   Normal       8.8-10.2      

 

             Ast/Sgot     32 U/L       Normal       7-37          

 

             Alt/SGPT     23 U/L       Normal       12-78         

 

             Alkaline Phosphatase 66 U/L       Normal               

 

             Bilirubin,Total 0.7 mg/dL    Normal       0.2-1.0       

 

             Total Protein 6.6 GM/DL    Normal       6.4-8.2       

 

             Albumin      3.7 GM/DL    Normal       3.2-5.2       

 

             Albumin/Globulin Ratio 1.3          Normal       1.2-2.2       

 

                    Type & Screen -Incl Blood Type,Walker,AB SC 10/20/2020         

 [address] [address] [phone] Blood Type A POSITIVE  Normal                 

 

             AB Screen (Indirect Anatoly)Vis NEGATIVE     Normal                 

    

 

                    Laboratory test finding 10/20/2020          [address] [address] [phone] Lipase     157 U/L    Normal           

 

             Thyroid Stimulating Hormone 3.480 uIU/ML Normal       0.358-3.740  

 

 

                    Liver Profile       10/20/2020          [facility]

nter

                                        [address] [place] [address] [phone] Ast/Sgot   23 U/L     Normal     7-37        

 

             Alt/SGPT     24 U/L       Normal       12-78         

 

             Alkaline Phosphatase 72 U/L       Normal               

 

             Bilirubin,Total 1.2 mg/dL    High         0.2-1.0       

 

             Bilirubin,Direct 0.4 mg/dL    High         0.0-0.2       

 

             Total Protein 6.7 GM/DL    Normal       6.4-8.2       

 

             Albumin      3.7 GM/DL    Normal       3.2-5.2       

 

             Albumin/Globulin Ratio 1.2          Normal       1.2-2.2       

 

                    Cardiac Marker Panel 10/20/2020          [facility]

enter

                                        [address] [place] [address] [phone] CPK Creatine Phosphokinase 62 U/L     Normal     26-19

2      

 

             CK-MB Value Mass < 1.0 NG/ML  Normal       <3.6          

 

             MB/CK Relative Index 1.61         Normal       < Or =4      2

 

             Troponin I   < 0.02 NG/ML Normal       < 0.10       3

 

                    Ua W/ Reflex To Culture 10/20/2020          [address] [address] [phone] Appearance, Urine RFX CLEAR      Normal     Clear     

  

 

             Color, Urine RFX YELLOW       Normal       Yellow        

 

             PH,Urine RFX 6.0 units    Normal       5.0-9.0       

 

             Specific Gravity Ur Auto RFX 1.012        Normal       1.002-1.035 

  

 

             Protein, Urine Auto RFX NEGATIVE mg/dL Normal       Negative      

 

             Glucose, Urine (Ua) Auto RFX NEGATIVE mg/dL Normal       Negative  

    

 

             Ketone, Urine Auto RFX NEGATIVE mg/dL Normal       Negative      

 

             Urobilinogen, Urine Auto RFX 0.2 mg/dL    Normal       0.0-2.0     

  

 

             Bilirubin, Urine Auto RFX NEGATIVE     Normal       Negative      

 

             Nitrite, Urine Auto RFX NEGATIVE     Normal       Negative      

 

             Leukocyte Esterase Ur Auto RFX NEGATIVE     Normal       Negative  

    

 

             Blood, Urine Blood RFX NEGATIVE     Normal       Negative      

 

             WBC, Urine Auto RFX 1 /HPF       Normal       0-3           

 

             RBC, Urine Auto RFX 1 /HPF       Normal       0-3           

 

             Bacteria, Urine Auto RFX NEGATIVE     Normal       Negative      

 

             Squam Epithelial Cell Ur Aurfx 0 /HPF       Normal       0-6       

    

 

             Hyaline Cast, Urine Auto RFX 0 /LPF       Normal       0-1         

  

 

                    Laboratory test finding 10/20/2020          [address] [address] [phone] Ammonia    < 10 uMOL/L Normal     <32         

 

                    CBC With Differential 10/20/2020          [address] [address] [phone] White Blood Count 14.2 10    High       4.0-10.0    

 

             Red Blood Count 4.35 10      Normal       4.00-5.40     

 

             Hemoglobin   13.1 g/dL    Normal       12.0-15.5     

 

             Hematocrit   40.2 %       Normal       36.0-47.0     

 

             Mean Corpuscular Volume 92.4 fl      Normal       80.0-96.0     

 

             Mean Corpuscular Hemoglobin 30.1 pg      Normal       27.0-33.0    

 

 

             Mean Corpuscular HGB Conc 32.6 g/dL    Normal       32.0-36.5     

 

             Red Cell Distribution Width 15.3 %       High         11.5-14.5    

 

 

             Platelet Count, Automated 178 10       Normal       150-450       

 

             Neutrophils % 75.0 %       High         36.0-66.0     

 

             Lymph %      5.6 %        Low          24.0-44.0     

 

             Mono %       17.9 %       High         0.0-5.0       

 

             Eos %        0.5 %        Normal       0.0-3.0       

 

             Baso %       0.4 %        Normal       0.0-1.0       

 

             Immature Granulocyte % 0.6 %        Normal       0-3.0         

 

             Nucleated Red Blood Cell % 0.0 %        Normal       0-0           

 

             Neutrophils # 10.7 10      High         1.5-8.5       

 

             Lymph #      0.8 10       Low          1.5-5.0       

 

             Mono #       2.5 10       High         0.0-0.8       

 

             Eos #        0.1 10       Normal       0.0-0.5       

 

             Baso #       0.1 10       Normal       0.0-0.2       

 

                    Istat PT/Inr        10/20/2020          [facility]

nter

                                        [address] [place] [address] [phone] iSTAT Protime Seconds 31.3 seconds High       12.1-14.

4   

 

             iSTAT Inr    2.7          Normal                     

 

                    Istat Chem8+ Panel  10/20/2020          [facility]

nter

                                        [address] [place] [address] [phone] iSTAT HCT  41.0 %     Normal     38.0-51.0   

 

             iSTAT Glucose 107 mg/dL    High                 

 

             iSTAT Sodium 136 mEq/L    Normal       136-145       

 

             iSTAT Potassium 3.9 mEq/L    Normal       3.5-5.1       

 

             iSTAT CA++   5.1 mg/dL    Normal       4.5-5.3       

 

             iSTAT Chloride 95 mEq/L     Low                  

 

             iSTAT Co2    30.0 MM/L    High         23.0-27.0     

 

             iSTAT BUN    30 mg/dL     High         8-26          

 

             iSTAT Creatinine 1.2 mg/dL    Normal       0.6-1.3       

 

                    Laboratory test finding 10/20/2020          [address] [address] [phone] iSTAT Lactate 0.90       Normal     0.4-2.0     

 

                    Laboratory test finding 10/20/2020          [address] [address] [phone] iSTAT Troponin 0.02 NG/ML Normal     0.00-0.08   

 

                    Complete Blood Count 09/10/2020          [facility]

s pc

: Dr Haris Telles

[place] [address]

           [phone] WBC        7.4 x10*3/UL            4.1 - 10.9  

 

             RBC          5.02 x10*6/UL              4.20 - 6.30   

 

             Hemoglobin   14.8 g/dL                 12.0 - 18.0   

 

             Hematocrit   44.4 %                    37.0 - 51.0   

 

             MCV          88.5 fL                   80.0 - 97.0   

 

             MCH          29.4 pg                   26.0 - 32.0   

 

             MCHC         33.3 g/dL                 31.0 - 38.0   

 

             RDW          14.3 %       High         11.6 - 13.7   

 

             PLT          208 x10*3/UL              140 - 440     

 

             MPV          8.8 FL                    7.8 - 11.0    

 

             Lymph %      16.7 %                    10.0 - 58.5   

 

             Mid %        4.6 %                     1.7 - 9.3     

 

             Neut %       78.7 %                    37.0 - 92.0   

 

             Lymph #      1.2 x10*3/UL              0.6 - 4.1     

 

             Mid #        0.4 x10*3/UL              0.1 - 0.6     

 

             Neut #       5.8 x10*3/UL              2.0 - 7.8     

 

                    Basic Metabolic Panel 09/10/2020          [facility]

[facility], [facility]

: Dr Haris Telles

[place] [address] [phone] Glucose    100 mg/dL  High       74 - 99    4

 

             BUN          24 mg/dL     High         7 - 18        

 

             Creatinine   1.4 mg/dL    High         0.6 - 1.3     

 

             Sodium       139 mEq/L                 136 - 145     

 

             Potassium    4.3 mEq/L                 3.5 - 5.1     

 

             Chloride     99 mEq/L                  98 - 107      

 

             Carbon Dioxide 36 mEq/L     High         21 - 32       

 

             Calcium      9.9 mg/dL                 8.5 - 10.1    

 

             GFR  36 mL/min    Low          >60           

 

             GFR African American 44 mL/min    Low          >60          5

 

                    Laboratory test finding 09/10/2020          [facility]

: Dr Haris Telles

[place], [place] [id number] [phone] Thyroid Stimulating Hormone 3.19 uIU/mL            0.3

6 - 3.74  

 

                    Laboratory test finding 2020          [facility]

             Inr          3.1                                     

 

                    Laboratory test finding 2020          [facility]

             Inr          2.5                                     

 

                    Complete Blood Count 06/10/2020          [facility]

s, pc

: [place] [address] [phone] WBC        8.1 x10*3/UL            4.1 - 10.9  

 

             RBC          5.07 x10*6/UL              4.20 - 6.30   

 

             Hemoglobin   14.8 g/dL                 12.0 - 18.0   

 

             Hematocrit   44.9 %                    37.0 - 51.0   

 

             MCV          88.5 fL                   80.0 - 97.0   

 

             MCH          29.3 pg                   26.0 - 32.0   

 

             MCHC         33.1 g/dL                 31.0 - 38.0   

 

             RDW          14.2 %       High         11.6 - 13.7   

 

             PLT          206 x10*3/UL              140 - 440     

 

             MPV          8.7 FL                    7.8 - 11.0    

 

             Lymph %      16.9 %                    10.0 - 58.5   

 

             Mid %        4.7 %                     1.7 - 9.3     

 

             Neut %       78.4 %                    37.0 - 92.0   

 

             Lymph #      1.3 x10*3/UL              0.6 - 4.1     

 

             Mid #        0.5 x10*3/UL              0.1 - 0.6     

 

             Neut #       6.3 x10*3/UL              2.0 - 7.8     

 

                    Basic Metabolic Panel 06/10/2020          [facility]

[facility]

: Dr Haris Telles

[place] [address] [phone] Glucose    76 mg/dL              74 - 99    6

 

             BUN          22 mg/dL     High         7 - 18        

 

             Creatinine   1.2 mg/dL                 0.6 - 1.3     

 

             Sodium       144 mEq/L                 136 - 145     

 

             Potassium    4.2 mEq/L                 3.5 - 5.1     

 

             Chloride     104 mEq/L                 98 - 107      

 

             Carbon Dioxide 33 mEq/L     High         21 - 32       

 

             Calcium      9.7 mg/dL                 8.5 - 10.1    

 

             GFR  43 mL/min    Low          >60           

 

             GFR African American 52 mL/min    Low          >60          7

 

                    Laboratory test finding 06/10/2020          [facility]

: Dr Haris Telles

[place], [place] [id number] [phone] Magnesium  2.1 mg/dL             1.8 - 2.4   







                          1                         Units are mL/min/1.73 m2



Chronic Kidney Disease Staging per NKF:



Stage I & II   GFR >=60       Normal to Mildly Decreased

Stage III      GFR 30-59      Moderately Decreased

Stage IV       GFR 15-29      Severely Decreased

Stage V        GFR <15        Very Little GFR Left

ESRD           GFR <15 on RRT



 

                          2                         DIAGNOSIS CRITERIA

MMB ng/ml       Relative Index (RI)

NON-AMI               < or = 5               N/A

GRAY ZONE              > 5                < or = 4

AMI                    > 5                   > 4



 

                          3                         Troponin I Reference Interva

l for [employer] Vista LOCI:



                                        99th Percentile= 0.00-0.045 ng/ml



Risk Stratification:

<= 0.10 ng/ml   Decreased Risk for Adverse Clinical

Events.

                                        0.10-1.50 ng/ml   Increased Risk for Adv

erse Clinical

Events. Evaluation of additional

criterion and/or repeat testing in 2-6

hours is suggested to rule out myocardial

damage.

>= 1.50 ng/ml   Indicative of Myocardial Injury.



 

                          4                         100-125 mg/dL     PRE-DIABET

ES/FASTING

>126 mg/dL          DIABETES/FASTING



 

                          5                         CHRONIC KIDNEY DISEASE STAGI

NG PER NKF



STAGE I & II      GFR >= 60        NORMAL TO MILDLY DECREASED

STAGE III          GFR 30-59          MODERATELY DECREASED

STAGE IV           GFR 15-29         SEVERELY DECREASED

STAGE V            GFR <15            VERY LITTLE GFR LEFT

ESRD                 GFR <15            ON RRT



 

                          6                         100-125 mg/dL     PRE-DIABET

ES/FASTING

>126 mg/dL          DIABETES/FASTING



 

                          7                         CHRONIC KIDNEY DISEASE STAGI

NG PER NKF



STAGE I & II      GFR >= 60        NORMAL TO MILDLY DECREASED

STAGE III          GFR 30-59          MODERATELY DECREASED

STAGE IV           GFR 15-29         SEVERELY DECREASED

STAGE V            GFR <15            VERY LITTLE GFR LEFT

ESRD                 GFR <15            ON RRT









Procedures





                Date            Code            Description     Status

 

                2018      [id number]       Diabetic Foot Exam Completed

 

                2017      [id number]        Mammogram       Completed

 

                01/15/2016      [id number]        Mammogram       Completed

 

                2015      [id number]        Mammogram       Completed

 

                2014      [id number]        Mammogram       Completed

 

                2013      [id number]       Diabetic Retinal Eye Exam Comple

[facility]

 

                2013      [id number]       Bone Mineral Density Test [facility]

 

                01/10/2013      [id number]        Mammogram       Completed

 

                2012      [id number]        Mammogram       Completed

 

                06/10/2011      [id number]       Bone Mineral Density Test [facility]

 

                2011      [id number]        Mammogram       Completed

 

                2009      [id number]        Mammogram       Completed

 

                2008      [id number]       Bone Mineral Density Test [facility]

 

                10/22/2004      [id number]        Colonoscopy     Completed







Medical Devices





                                        Description

 

                                        No Information Available







Encounters





           Type       Date       Location   Provider   Dx         Diagnosis

 

             Office Visit 09/10/2020  1:40p [facility] Екатерина Garcia Pi

ne, FNP I13.0

                                        Hyp hrt & chr kdny dis w hrt fail and st

g 1-4/unsp chr kdny

 

                          I50.42                    Chronic combined systolic an

d diastolic hrt fail

 

                          N18.3                     Chronic kidney disease, stag

e 3 (moderate)

 

                          J44.9                     Chronic obstructive pulmonar

y disease, unspecified

 

                          G30.1                     Alzheimer's disease with lat

e onset

 

                          F02.80                    Dementia in oth diseases [facility] elswhr w/o behavrl disturb

 

                          I48.21                    Permanent atrial fibrillatio

n

 

                          Z79.01                    Long term (current) use of a

nticoagulants

 

                          Z95.2                     Presence of prosthetic heart

 valve

 

                          I87.2                     Venous insufficiency (chroni

c) (peripheral)

 

             Office Visit 06/10/2020  2:00p [facility] Екатерина Lemus

ne, [facility] I13.0

                                        Hyp hrt & chr kdny dis w hrt fail and st

g 1-4/[facility] chr kdny

 

                          I50.42                    Chronic combined systolic an

d diastolic hrt fail

 

                          N18.3                     Chronic kidney disease, stag

e 3 (moderate)

 

                          J44.9                     Chronic obstructive pulmonar

y disease, unspecified

 

                          G30.1                     Alzheimer's disease with lat

e onset

 

                          F02.80                    Dementia in oth diseases [facility] elswhr w/o behavrl disturb

 

                          I48.21                    Permanent atrial fibrillatio

n

 

                          Z79.01                    Long term (current) use of a

nticoagulants

 

                          Z95.2                     Presence of prosthetic heart

 valve

 

                          Z13.89                    Encounter for screening for 

other disorder







Assessments





                Date            Code            Description     Provider

 

                    10/29/2020          I50.41              Acute combined systo

lic (congestive) and diastolic 

(congestive) heart failure              DIOGO Bajwa

 

                10/29/2020      J44.9           Chronic obstructive pulmonary di

sease, unspecified Екатерина Nguyen

[facility]

 

                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

idney disease with heart lakshmi 

DIOGO Bajwa

 

                10/29/2020      N18.31          Chronic kidney disease, stage 3a

 Екатерина Nguyen [facility]

 

                10/29/2020      I48.21          Permanent atrial fibrillation An

marino Nguyen [facility]

 

                10/29/2020      Z95.2           Presence of prosthetic heart zakia

ve Екатерина Nguyen [facility]

 

                10/29/2020      Z79.01          Long term (current) use of antic

oagulants DIOGO Bajwa

 

                10/29/2020      R26.89          Other abnormalities of gait and 

mobility CLARISSA Bajwa

 

                2020      J44.9           Chronic obstructive pulmonary di

sease, unspecified Haris Telles MD

 

                2020      G30.1           Alzheimer's disease with late on

set Haris Telles MD

 

                2020      I48.21          Permanent atrial fibrillation Co

amanda Telles MD

 

                2020      I10             Essential (primary) hypertension

 Haris Telles MD

 

                09/10/2020      I13.0           Hypertensive heart and chronic k

idney disease with heart lakshmi 

Екатерина Nguyen, [facility]

 

                    09/10/2020          I50.42              Chronic combined sys

tolic (congestive) and diastolic 

(congestive) heart failure              Екатерина Nguyen, [facility]

 

                09/10/2020      N18.3           Chronic kidney disease, stage 3 

(moderate) Екатерина Nguyen, [facility]

 

                09/10/2020      J44.9           Chronic obstructive pulmonary di

sease, unspecified Екатерина Nguyen,

[facility]

 

                09/10/2020      G30.1           Alzheimer's disease with late on

set Екатерина Nguyen, [facility]

 

                    09/10/2020          F02.80              Dementia in other di

seases classified elsewhere without 

behavioral disturbance                  Екатерина Nguyen [facility]

 

                09/10/2020      I48.21          Permanent atrial fibrillation An

n Radha Goncalves, [facility]

 

                09/10/2020      Z79.01          Long term (current) use of antic

oagulants Екатерина Nguyen, [facility]

 

                09/10/2020      Z95.2           Presence of prosthetic heart zakia

ve Екатерина Nguyen, [facility]

 

                09/10/2020      I87.2           Venous insufficiency (chronic) (

peripheral) Екатерина Nguyen [facility]

 

                2020      Z11.1           Encounter for screening for resp

iratory tuberculosis Haris Telles MD

 

                2020      I48.21          Permanent atrial fibrillation An

n Radha Goncalves [facility]

 

                2020      I48.21          Permanent atrial fibrillation Pr

otime

 

                2020      Z51.81          Encounter for therapeutic drug l

evel monitoring Екатерина Nguyen 

[facility]

 

                2020      Z51.81          Encounter for therapeutic drug l

evel monitoring [employer]

 

                2020      Z79.01          Long term (current) use of antic

oagulants Екатерина Nguyen [facility]

 

                2020      Z79.01          Long term (current) use of antic

oagulants [facility]

 

                2020      I48.21          Permanent atrial fibrillation An

n Radha Goncalves [facility]

 

                2020      I48.21          Permanent atrial fibrillation Pr

otime

 

                2020      Z51.81          Encounter for therapeutic drug l

evel monitoring Екатерина Nguyen, 

[facility]

 

                2020      Z79.01          Long term (current) use of antic

oagulants Екатерина Nguyen, [facility]

 

                2020      I10             Essential (primary) hypertension

 Haris Telles MD

 

                2020      N18.3           Chronic kidney disease, stage 3 

(moderate) Haris Telles MD

 

                2020      J44.9           Chronic obstructive pulmonary di

sease, unspecified Haris Telles MD

 

                2020      G30.1           Alzheimer's disease with late on

set Haris Telles MD

 

                2020      N18.3           Chronic kidney disease, stage 3 

(moderate) Haris Telles MD

 

                2020      J44.9           Chronic obstructive pulmonary di

sease, unspecified Haris Telles MD

 

                2020      G30.1           Alzheimer's disease with late on

set Haris Telles MD

 

                2020      I10             Essential (primary) hypertension

 Haris Telles MD

 

                06/10/2020      I13.0           Hypertensive heart and chronic k

idney disease with heart lakshmi 

Екатерина Nguyen, [facility]

 

                    06/10/2020          I50.42              Chronic combined sys

tolic (congestive) and diastolic 

(congestive) heart failure              Екатерина Nguyen [facility]

 

                06/10/2020      N18.3           Chronic kidney disease, stage 3 

(moderate) Екатерина Nguyen [facility]

 

                06/10/2020      J44.9           Chronic obstructive pulmonary di

sease, unspecified Екатерина Nguyen

[facility]

 

                06/10/2020      G30.1           Alzheimer's disease with late on

set Екатерина Nguyen, [facility]

 

                    06/10/2020          F02.80              Dementia in other di

seases classified elsewhere without 

behavioral disturbance                  Екатерина Nguyen [facility]

 

                06/10/2020      I48.21          Permanent atrial fibrillation An

n Radha Goncalves, [facility]

 

                06/10/2020      Z79.01          Long term (current) use of antic

oagulants Екатерина Nguyen, [facility]

 

                06/10/2020      Z95.2           Presence of prosthetic heart zakia

ve Екатерина Nguyen, [facility]

 

                06/10/2020      Z13.89          Encounter for screening for othe

r disorder CLARISSA Bajwa

 

                2020      N18.3           Chronic kidney disease, stage 3 

(moderate) Haris Telles MD

 

                2020      J44.9           Chronic obstructive pulmonary di

sease, unspecified Haris Telles MD

 

                2020      I10             Essential (primary) hypertension

 Haris Telles MD

 

                2020      E66.3           Overweight      Haris srinivasan MD

 

                2020      N18.3           Chronic kidney disease, stage 3 

(moderate) Haris Telles MD

 

                2020      J44.9           Chronic obstructive pulmonary di

sease, unspecified Haris Telles MD

 

                2020      G30.1           Alzheimer's disease with late on

set Haris Telles MD

 

                2020      I10             Essential (primary) hypertension

 Haris Telles MD







Plan of Treatment

Future Appointment(s):* 2020  3:00 pm - CLARISSA Bajwa at [facility]

* 2020  3:20 pm - CLARISSA Bajwa at [facility]

10/29/2020 - CLARISSA Bajwa* I50.41 Acute combined systolic (congestive) and 
  diastolic (congestive) heart failure* New Orders:* CBC and CMP, Scheduled: 
  20



* Comments:* will increase Torsemide to 100mg daily.will arrange for [facility] to evaluate.





* J44.9 Chronic obstructive pulmonary disease, unspecified* Comments:* will 
  request BMP in one week.





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
  * Blood pressure is controlled on current treatment plan.





* N18.31 Chronic kidney disease, stage 3a

* I48.21 Permanent atrial fibrillation* Comments:* Rate controlled.





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

* Z79.01 Long term (current) use of anticoagulants* Comments:* INR completed at 
  home.  Has been supratherapeutic due to interaction of Warfarin and 
  Metronidazole.  Verbal  instructions given. Signs and symptoms to report re
  viewed.  No evidence of thromboembolic or bleeding events.





* R26.89 Other abnormalities of gait and mobility* New Orders:* Physical 
  Therapy, Ordered: 10/29/20



* Comments:* will request home physical therapy.





* All * Comments:* Will need MARITZA.Tried to discuss [facility] and advance 
  directives.Booklet "hard choices for loving people" given.









Functional Status





                                        Description

 

                                        No Information Available







Mental Status





                                        Description

 

                                        No Information Available







Referrals





                                        Description

 

                                        No Information Available

## 2021-01-22 NOTE — REP
INDICATION:

hip pain



COMPARISON:

None.



TECHNIQUE:

AP and lateral views of the mid to distal femur.



FINDINGS:

Visualized portions of the mid to distal femur demonstrate age-related degenerative

changes. No acute fracture or dislocation.



IMPRESSION:

.  No acute fracture or dislocation.





<Electronically signed by Miguel Ángel Hedrick > 01/22/21 0753

## 2021-01-22 NOTE — REP
INDICATION:

hip pain



COMPARISON:

01/17/2018



TECHNIQUE:

Portable AP view of the chest



FINDINGS:

The mediastinum and cardiac silhouette are stable and cardiomegaly again noted.  The

lung fields demonstrate chronic appearing changes.  Subtle right lower lobe airspace

disease cannot be excluded.  Skeletal structures intact.  Evidence for prior

sternotomy and pacemaker.



IMPRESSION:

Stable cardiomegaly and chronic changes.

Cannot exclude subtle right lower lobe opacity.





<Electronically signed by Miguel Ángel Hedrick > 01/22/21 0704

## 2021-01-22 NOTE — IPNPDOC
Date Seen


The patient was seen on 21.





Progress Note


CHIEF COMPLAINT: mechanical fall at [facility]





HPI:


86 y/o F lives w her  at [facility] fell in her bedroom 

between the two twin


 beds when she got up in the middle of the night to go to the bathroom to 

urinate, scraping her face, 


neck, and groin on the bed frame, needing sutures in the ER for the groin 

laceration. Pt denied


any prodromal symptoms such as chest pain, pressure, tightness, dizziness, 

lightheadedness,


cough, nausea, vomiting,diaphoresis. Family requesting SNF placement due to 

worsening


dementia and gait imbalance. She otherwise denies any infectious symptoms such 

as fever,


chills, dysuria, urgency, frequency, diarrhea, abd pain. no other c/o.








PAST MEDICAL HISTORY:


1. Alzheimer's Dementia.


2. HTN.


3. A-Fib.


4. Diastolic Heart Failure.


5. COPD


6. Mechanical Mitral Valve 


7. CKD Stage 3





PAST SURGICAL HISTORY:


1. Pacemaker.


2. Mitral Mechanical Valve Replacement.





SOCIAL HISTORY:


Marital status: .


Resides in: [facility]. Has resided there since 

2020


Employment: Retired . 


Tobacco use: Former smoker. Daughter reports that she quit in the 70s, and 

smoked less than one pack a day


ETOH: used to drink socially. 





FAMILY HISTORY:


Mother:  of a stroke in her 60s.  





ALLERGIES: Please see below.





REVIEW OF SYSTEMS:


12 point ROS negative aside from +findings on HPI





HOME MEDICATIONS: Please see below. 





PHYSICAL EXAMINATION:


vitals: see below


GEN: aaox1 person only. pleasant but confused no pallor


no distress. speaks in full sentences


HEENT: superficial laceration on left forehead and left neck, 


EOMI, no cervical LAD or thyromegaly . face is symmetric


Lungs: CTAB


Heart: S1S2 irregularly irregular


Abd: +bs soft groin tender w sutures intact no purulence or bloody


discharge


ext: no cyanosis or clubbing





LABORATORY DATA, IMAGING STUDIES, MICROBIOLOGY: SEE BELOW





ASSESSMENT AND PLAN: 


86 y/o F lives w her  at [facility] fell in her bedroom 

between the two twin


 beds when she got up in the middle of the night to go to the bathroom to 

urinate, scraping her face, 


neck, and groin on the bed frame, needing sutures in the ER for the groin 

laceration. Pt denied


any prodromal symptoms such as chest pain, pressure, tightness, dizziness, 

lightheadedness,


cough, nausea, vomiting,diaphoresis. Family requesting SNF placement due to 

worsening


dementia and gait imbalance. She otherwise denies any infectious symptoms such 

as fever,


chills, dysuria, urgency, frequency, diarrhea, abd pain. no other c/o.Pt has 

been admitted as


an inpatient s/p  mechanical fall w groin laceration requiring sutures, for 

placement 


due toworsening dementia and gait imbalance per familyrequest. 





Mechanical Fall


Traumatic Injury


Face, neck, groin laceration


groin laceration requiring sutures


gait imbalance


Alzheimer's Dementia.


 HTN.


 A-Fib.


 Diastolic Heart Failure.


COPD


Mechanical Mitral Valve 


 CKD Stage 3





PLAN:


s/p sutures by ER MD Dr. Seay. topical bacitracin for open lacerations.


resumed on home meds. prn tramadol for pain. bid tylenol for comfort.


pfs consulted to assist in snf placement. no other acute medical issues.





[facility]


Vital Signs/I&O





Vital Signs








  Date Time  Temp Pulse Resp B/P (MAP) Pulse Ox O2 Delivery O2 Flow Rate FiO2


 


21 12:31  56 18 198/96 (130) 97 Room Air  


 


21 07:11 97.1       











Laboratory Data


24H LABS


Laboratory Tests 2


21 02:57: 


Immature Granulocyte % (Auto) 0.5, Neutrophils (%) (Auto) 69.2H, Lymphocytes (%)

(Auto) 10.7L, Monocytes (%) (Auto) 16.9H, Eosinophils (%) (Auto) 2.1, Basophils 

(%) (Auto) 0.6, Neutrophils # (Auto) 6.5, Lymphocytes # (Auto) 1.0L, Monocytes #

(Auto) 1.6H, Eosinophils # (Auto) 0.2, Basophils # (Auto) 0.1, Nucleated Red 

Blood Cells % (auto) 0.0, Prothrombin Time 26.7H, Prothromb Time International 

Ratio 2.40, Anion Gap 8, Glomerular Filtration Rate 49.0, Calcium Level 9.8, 

Total Creatine Kinase 57, Creatine Kinase MB 1.1, Creatine Kinase MB Relative 

Index 1.93, Troponin I < 0.02


21 10:12: 


Coronavirus (COVID-19)(PCR) NEGATIVE, Influenza Type A (RT-PCR) NEGATIVE, 

Influenza Type B (RT-PCR) NEGATIVE, Respiratory Syncytial Virus (PCR) NEGATIVE


CBC/BMP


Laboratory Tests


21 02:57

















DASH BRAR MD            2021 13:11

## 2021-01-22 NOTE — CCD
Continuity of Care Document

                             Created on: 2020



Xena Basilio

External Reference #: [id number]

: 1935

Sex: Female



Demographics





                          Address                   [address] [place] [address]

 

                          Preferred Language        English

 

                          Marital Status            Unknown

 

                          Buddhism Affiliation     Unknown

 

                          Race                      Unknown

 

                          Ethnic Group              Unknown





Author





                          Author                    [employer]

d

 

                          Organization              Unknown

 

                          Address                   Unknown

 

                          Phone                     Unavailable







Care Team Providers





                    Care Team Member Name Role                Phone

 

                    Haris Telles    Unavailable          [phone]

 

                          Unavailable               Unavailable

 

                    Haris Telles    Unavailable          [phone]

 

                          Unavailable               Unavailable

 

                    Alyson Youngsa       Unavailable          [phone]

 

                    Tracie Almonte Unavailable          [phone]

 

                    Mendel Stevenson       Unavailable          [phone]



                                                  



Problems

                



                    Name                Dates               Details

 

                                                             Encephalopathy, uns

pecified          (G93.40)

                          20-Oct-2020               Status: Active

 

                                                             Presence of prosthe

tic heart valve          (Z95.2)

                          20-Oct-2020               Status: Active

 

                                                             Presence of cardiac

 pacemaker          (Z95.0)

                          20-Oct-2020               Status: Active

 

                                                             History of falling 

         (Z91.81)

                          20-Oct-2020               Status: Active

 

                                                             Long term (current)

 use of inhaled steroids          

(Z79.51)

                          20-Oct-2020               Status: Active

 

                                                             Long term (current)

 use of anticoagulants          (Z79.01)

                          20-Oct-2020               Status: Active

 

                                                             Encounter for thera

peutic drug level monitoring          

(Z51.81)

                          20-Oct-2020               Status: Active

 

                                                             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

rosis without current pathological 

fracture          (M81.0)

                          20-Oct-2020               Status: Active

 

                                                             Unspecified osteoar

thritis, unspecified site          

(M19.90)

                          20-Oct-2020               Status: Active

 

                                                             Permanent atrial fi

brillation          (I48.21)

                          20-Oct-2020               Status: Active

 

                                                             Dementia in other d

iseases classified elsewhere without 

behavioral disturbance          (F02.80)

                          20-Oct-2020               Status: Active

 

                                                             Alzheimer's disease

, unspecified          (G30.9)

                          20-Oct-2020               Status: Active

 

                                                             Chronic kidney dise

ase, stage 3a          (N18.31)

                          20-Oct-2020               Status: Active

 

                                                             Acute combined syst

olic (congestive) and diastolic 

(congestive) heart failure          (I50.41)

                          20-Oct-2020               Status: Active

 

                                                             Hypertensive heart 

and chronic kidney disease with heart 

failure and stage 1 through stage 4 chronic kidney disease, or unspecified 
chronic kidney disease          (I13.0)

                          20-Oct-2020               Status: Active

 

                                                             Diverticulitis of i

ntestine, part unspecified, without 

perforation or abscess without bleeding          (K57.92)

                          20-Oct-2020               Status: Active

 

                                                             Chronic obstructive

 pulmonary disease with (acute) 

exacerbation          (J44.1)

                          03-Oct-2020               Status: Active



                                                                                
                                                                                
                                                                       



Medications

                



                    Name                Dates               Details

 

                                        Xopenex 0.63 MG/3ML

every 6 hours as needed for wheezing or shortness of breath 

Haris Telles MD 

 







Active

Biaxin 500 MG

1 tab 1 hour prior to dental procedures

Bryan Telles MD 



                                         Start : 2020





Active

Ipratropium-Albuterol 0.5-2.5 (3) MG/3ML

every 6 hours as needed for shortness of breath

Bryan Telles MD 



                                         Start : 2020





Active

Klor-Con M10 10 MEQ



Bryan Telles MD 



                                         Start : 2020





Active

Spironolactone 25 MG



Bryan Telles MD 



                                         Start : 2020





Active

Multivitamin 



Bryan Telles MD 



                                         Start : 2020





Active

Simvastatin 40 MG



Bryna Telles MD 



                                         Start : 2020





Active

Coumadin 3 MG



Bryan Telles MD 



                                         Start : 2020





Active

Albuterol Sulfate (2.5 MG/3ML) 0.083%

via nebulizer for SOB, no more than 4x/day

Bryan Telles MD 



                                         Start : 2020





Active

Tylenol 

2 by mouth everyday as needed for pain >5, no more than 3,000 mg/day.

Bryan Telles MD 



                                         Start : 2020





Active

Torsemide 100 MG



Bryan Telles MD 



                                         Start : 2020





Active

Calcium 



Bryan Telles MD 



                                         Start : 2020





Active

Colace 



Bryan Telles MD 



                                         Start : 2020





Active

Advair Diskus 100-50 MCG/DOSE

one puff by dulce maria twice a day

Bryan Telles MD 



                                         Start : 2020





Active

Vitamin D 



Bryan Telles MD 



                                         Start : 2020





Active

Incruse Ellipta 62.5 MCG/INH



Bryan Telles MD 



                                         Start : 2020





Active

Levothyroxine 



Bryan Telles MD 



                                         Start : 2020





Active

Aricept 5 MG



Bryan Telles MD 



                                         Start : 2020





Active

Spironolactone 25 MG



Bryan Telles MD 



                           Start : 2018       End : 2018





Inactive

Aricept 5 MG



Bryan Telles MD 



                           Start : 2018       End : 2018





Inactive

Synthroid 25 MCG



Bryan Telles MD 



                           Start : 2018       End : 2018





Inactive

Multivitamin Adults 



Bryan Telles MD 



                           Start : 2018       End : 2018





Inactive

Potassium Chloride Makenzie ER 20 MEQ

2 tablets twice a day

Bryan Telles MD 



                           Start : 2018       End : 2018





Inactive

Vitamin D3 5000 UNIT



Bryan Telles MD 



                           Start : 2018       End : 2018





Inactive

Colace 100 MG

daily as needed for constipation

Bryan Telles MD 



                           Start : 2018       End : 2018





Inactive

predniSONE 10 MG

3 tablets for 3 days, 2 tablets for 3 days, 1 tablet for 3 days then stop.

Bryan Telles MD 



                           Start : 2018       End : 2018





Inactive

Mucinex 600 MG



Bryan Telles MD 



                           Start : 2018       End : 2018





Inactive

Coumadin 3 MG



Bryan Telles MD 



                           Start : 2018       End : 2018





Inactive

Albuterol Sulfate  (90 Base) MCG/ACT

2 puffs four times a day as needed for SOB

Bryan Telles MD 



                           Start : 2018       End : 2018





Inactive

Albuterol Sulfate (2.5 MG/3ML) 0.083%



Bryan Telles MD 



                           Start : 2018       End : 2018





Inactive

Calcium 500+D 500-200 MG-UNIT



Bryan Telles MD 



                           Start : 2018       End : 2018





Inactive

Advair Diskus 100-50 MCG/DOSE



Bryan Telles MD 



                           Start : 2018       End : 2018





Inactive

Simvastatin 40 MG



Gadsden MD, Carballo* 



                           Start : 2018       End : 2018





Inactive

Torsemide 100 MG

0800 and noon 

Haris Telles MD* 



                           Start : 2018       End : 2018





Inactive

Tylenol 325 MG

2 tablets Q6hrs as needed for pain. Max daily dose: 3000mg 

Haris Telles MD* 



                           Start : 2018       End : 2018





Inactive

                                                                                
                                                                                
                                                                                
                                                                                
                                                                                
        



Allergies and Adverse Reactions

          



                    Name                Dates               Details

 

                                         chlorpromazine (Allergy) Onset:    Status:  

Active 



 

                                         codeine (Allergy) Onset:  2020  

Status:  Active 



 

                                         Penicillin (Allergy) Onset:  

0  Status:  Active 





                                                                                
       



Results

                



                Date            Description     Value           Details

 

                                                                                

            

                                                  No Known Results              

 

                                                                

 

                                                                         



                                                                         



Plan of Care

                



                    Name                Dates               Details

 

                                        Instructions         

 

                                                  Diet:Regular Diet             

        

                                                                             Ins

truction Type: 

          Nutrition education                               

                           



                                                                         



Payers

                * Medicare - [facility]

* [facility] [employer]

## 2021-01-22 NOTE — CCD
Continuity of Care Document

                             Created on: 2020



Xena Basilio

External Reference #: [id number]

: [id number]

Sex: Female



Demographics





                          Address                   [address] [place] [address]

 

                          Preferred Language        English

 

                          Marital Status            Unknown

 

                          Pentecostalism Affiliation     Unknown

 

                          Race                      Unknown

 

                          Ethnic Group              Unknown





Author





                          Author                    [employer]

[employer]

 

                          Organization              Unknown

 

                          Address                   Unknown

 

                          Phone                     Unavailable







Care Team Providers





                    Care Team Member Name Role                Phone

 

                    Haris Telles    Unavailable          [phone]

 

                          Unavailable               Unavailable

 

                    Haris Telles    Unavailable          [phone]

 

                          Unavailable               Unavailable

 

                    [employer]       Unavailable          [phone]

 

                    Tracie Almonte Unavailable          [phone]

 

                    Elva Mendel       Unavailable          [phone]



                                                  



Problems

                



                    Name                Dates               Details

 

                                                             Acute combined syst

olic (congestive) and diastolic 

(congestive) heart failure          (I50.41)

                          29-Oct-2020               Status: Active



                                                                         



Medications

                



                    Name                Dates               Details

 

                                        Xopenex 0.63 MG/3ML

every 6 hours as needed for wheezing or shortness of breath 

Haris Telles MD 

 







Active

Biaxin 500 MG

1 tab 1 hour prior to dental procedures

Bryan Telles MD 



                                         Start : 2020





Active

Ipratropium-Albuterol 0.5-2.5 (3) MG/3ML

every 6 hours as needed for shortness of breath

Bryan Telles MD 



                                         Start : 2020





Active

Klor-Con M10 10 MEQ



Bryan Telles MD 



                                         Start : 2020





Active

Spironolactone 25 MG



Bryan Telles MD 



                                         Start : 2020





Active

Calcium 



Bryan Telles MD 



                                         Start : 2020





Active

Torsemide 100 MG



Bryan Telles MD 



                                         Start : 2020





Active

Tylenol 

2 by mouth everyday as needed for pain >5, no more than 3,000 mg/day.

Bryan Telles MD 



                                         Start : 2020





Active

Albuterol Sulfate (2.5 MG/3ML) 0.083%

via nebulizer for SOB, no more than 4x/day

Bryan Telles MD 



                                         Start : 2020





Active

Coumadin 3 MG



Bryan Telles MD 



                                         Start : 2020





Active

Simvastatin 40 MG



Bryan Telles MD 



                                         Start : 2020





Active

Multivitamin 



Bryan Telles MD 



                                         Start : 2020





Active

Colace 



Bryan Telles MD 



                                         Start : 2020





Active

Advair Diskus 100-50 MCG/DOSE

one puff by dulce maria twice a day

Bryan Telles MD 



                                         Start : 2020





Active

Vitamin D 



Bryan Telles MD 



                                         Start : 2020





Active

Incruse Ellipta 62.5 MCG/INH



Byran Telles MD 



                                         Start : 2020





Active

Levothyroxine 



Bryan Telles MD 



                                         Start : 2020





Active

Aricept 5 MG



Bryan Telles MD 



                                         Start : 2020





Active

Spironolactone 25 MG



Bryan Telles MD 



                           Start : 2018       End : 2018





Inactive

Tylenol 325 MG

2 tablets Q6hrs as needed for pain. Max daily dose: 3000mg 

Bryan Telles MD 



                           Start : 2018       End : 2018





Inactive

Torsemide 100 MG

0800 and noon 

Bryan Telles MD 



                           Start : 2018       End : 2018





Inactive

Simvastatin 40 MG



Bryan Telles MD 



                           Start : 2018       End : 2018





Inactive

Advair Diskus 100-50 MCG/DOSE



Bryan Telles MD 



                           Start : 2018       End : 2018





Inactive

Calcium 500+D 500-200 MG-UNIT



Bryan Telles MD 



                           Start : 2018       End : 2018





Inactive

Albuterol Sulfate (2.5 MG/3ML) 0.083%



Bryan Telles MD 



                           Start : 2018       End : 2018





Inactive

Albuterol Sulfate  (90 Base) MCG/ACT

2 puffs four times a day as needed for SOB

Bryan Telles MD 



                           Start : 2018       End : 2018





Inactive

Coumadin 3 MG



Bryan Telles MD 



                           Start : 2018       End : 2018





Inactive

Mucinex 600 MG



Bryan Telles MD 



                           Start : 2018       End : 2018





Inactive

predniSONE 10 MG

3 tablets for 3 days, 2 tablets for 3 days, 1 tablet for 3 days then stop.

Bryan Telles MD 



                           Start : 2018       End : 2018





Inactive

Colace 100 MG

daily as needed for constipation

Bryan Telles MD 



                           Start : 2018       End : 2018





Inactive

Vitamin D3 5000 UNIT



Bryan Telles MD 



                           Start : 2018       End : 2018





Inactive

Potassium Chloride Makenzie ER 20 MEQ

2 tablets twice a day

Bryan Telles MD 



                           Start : 2018       End : 2018





Inactive

Multivitamin Adults 



Bryan Telles MD 



                           Start : 2018       End : 2018





Inactive

Synthroid 25 MCG



Bryan Telles MD 



                           Start : 2018       End : 2018





Inactive

Aricept 5 MG



Bryan Telles MD 



                           Start : 2018       End : 2018





Inactive

                                                                                
                                                                                
                                                                                
                                                                                
                                                                                
        



Allergies and Adverse Reactions

          



                    Name                Dates               Details

 

                                         chlorpromazine (Allergy) Onset:    Status:  

Active 



 

                                         codeine (Allergy) Onset:  2020  

Status:  Active 



 

                                         Penicillin (Allergy) Onset:  

0  Status:  Active 





                                                                                
       



Results

                



                Date            Description     Value           Details

 

                                                                                

            

                                                  No Known Results              

 

                                                                

 

                                                                         



                                                                         



Plan of Care

                



                    Name                Dates               Details

 

                                        Instructions         

 

                                                  Diet:Regular Diet             

        

                                                                             Ins

truction Type: 

          Nutrition education                               

                           



                                                                         



Payers

                * Medicare - [facility]

* [facility]

## 2021-01-22 NOTE — CCD
Summarization Of Episode

                             Created on: 2021



GURWINDER LAGOS

External Reference #: [id number]

: 1935

Sex: Female



Demographics





                          Address                   [address] 

[place] [address]

 

                          Home Phone                [phone]

 

                          Preferred Language        English

 

                          Marital Status            

 

                          Christian Affiliation     [place]

 

                          Race                      White

 

                          Ethnic Group              Not  or 





Author





                          Author                    [facility]

 

                          Organization              [facility]

 

                          Address                   Unknown

 

                          Phone                     Unavailable







Support





                Name            Relationship    Address         Phone

 

                    ROMULO LAGOS Next Of Kin         [id number] 

[place] [address]                    [phone]

 

                    THEKAREN KEARNS      Next Of Kin         [address]

[place] [address]                    [phone]

 

                RE              Next Of Kin     Unknown         Unavailable

 

                    ADOLPH LAGOS    Next Of Kin         [address]

[place] [address]                    [phone]

 

                    TheKaren kearns      ECON                [id number] 

[place] [address]                    [phone]

 

                    Pasha Lagos     ECON                [address]

[place] [address]                    [phone]







Care Team Providers





                    Care Team Member Name Role                Phone

 

                    Yon, L Charu PA Unavailable         Unavailable

 

                    Yon, L Charu PA Unavailable         Unavailable

 

                    Yon, L Charu PA Unavailable         Unavailable

 

                    Yon, L Charu PA Unavailable         Unavailable

 

                    Yon, L Charu PA Unavailable         Unavailable

 

                    Yon, L Charu PA Unavailable         Unavailable

 

                    Yon, L Charu PA Unavailable         Unavailable

 

                    Yon, L Charu PA Unavailable         Unavailable

 

                    Yon, L Charu PA Unavailable         Unavailable

 

                    Yon, L Charu PA Unavailable         Unavailable

 

                    Yon, L Charu PA Unavailable         Unavailable

 

                    Yon, L Charu PA Unavailable         Unavailable

 

                    Yon, L Charu PA Unavailable         Unavailable

 

                    Yon, L Charu PA Unavailable         Unavailable

 

                    Yon, L Charu PA Unavailable         Unavailable

 

                    Yon, L Charu PA Unavailable         Unavailable

 

                    Yon, L Charu PA Unavailable         Unavailable

 

                    Yon, L Charu PA Unavailable         Unavailable

 

                    Yon, L Charu PA Unavailable         Unavailable

 

                    Yon, L Charu PA Unavailable         Unavailable

 

                    Yon, L Charu PA Unavailable         Unavailable

 

                    Yon, L Charu PA Unavailable         Unavailable

 

                    Yon, L Charu PA Unavailable         Unavailable

 

                    HELLEN Zuluaga RNP   Unavailable         Unavailable

 

                    HELLEN Zuluaga RNP   Unavailable         Unavailable

 

                    HELLEN Zuluaga RNP   Unavailable         Unavailable

 

                    HELLEN Zuluaga RNP   Unavailable         Unavailable

 

                    HELLEN Zuluaga RNP   Unavailable         Unavailable

 

                    LePine, M Екатерина RNP   Unavailable         Unavailable

 

                    LePine, M Екатерина RNP   Unavailable         Unavailable

 

                    LePine, M Екатерина RNP   Unavailable         Unavailable

 

                    LePine, M Екатерина RNP   Unavailable         Unavailable

 

                    LePine, M Екатерина RNP   Unavailable         Unavailable

 

                    LePine, M Екатерина RNP   Unavailable         Unavailable

 

                    LePine, M Екатерина RNP   Unavailable         Unavailable

 

                    LePine, M Екатерина RNP   Unavailable         Unavailable

 

                    LePine, M Екатерина RNP   Unavailable         Unavailable

 

                    LePine, M Екатерина RNP   Unavailable         Unavailable

 

                    LePine, M Екатерина RNP   Unavailable         Unavailable

 

                    LePine, M Екатерина RNP   Unavailable         Unavailable

 

                    LePine, M Екатерина RNP   Unavailable         Unavailable

 

                    LePine, M Екатерина RNP   Unavailable         Unavailable

 

                    LePine, M Екатерина RNP   Unavailable         Unavailable

 

                    LePine, M Екатерина RNP   Unavailable         Unavailable

 

                    LePine, M Екатерина RNP   Unavailable         Unavailable

 

                    LePine, M Екатерина RNP   Unavailable         Unavailable

 

                    LePine, M Екатерина RNP   Unavailable         Unavailable

 

                    LePine, M Екатерина RNP   Unavailable         Unavailable

 

                    LePine, M Екатерина RNP   Unavailable         Unavailable

 

                    LePine, M Екатерина RNP   Unavailable         Unavailable

 

                    LePine, M Екатерина RNP   Unavailable         Unavailable

 

                    LePine, M Екатерина RNP   Unavailable         Unavailable

 

                    LePine, M Екатерина RNP   Unavailable         Unavailable

 

                    LePine, M Екатерина RNP   Unavailable         Unavailable

 

                    LePine, M Екатерина RNP   Unavailable         Unavailable

 

                    LePine, M Екатерина RNP   Unavailable         Unavailable

 

                    LePine, M Екатерина RNP   Unavailable         Unavailable

 

                    LePine, M Екатерина RNP   Unavailable         Unavailable

 

                    LePine, M Екатерина RNP   Unavailable         Unavailable

 

                    LePine, M Екатерина RNP   Unavailable         Unavailable

 

                    LePine, M Екатерина RNP   Unavailable         Unavailable

 

                    LePine, M Екатерина RNP   Unavailable         Unavailable

 

                    LePine, M Екатерина RNP   Unavailable         Unavailable

 

                    LePine, M Екатерина RNP   Unavailable         Unavailable

 

                    LePine, M Екатерина RNP   Unavailable         Unavailable

 

                    LePine, M Екатерина RNP   Unavailable         Unavailable

 

                    LePine, M Екатерина RNP   Unavailable         Unavailable

 

                    LePine, M Екатерина RNP   Unavailable         Unavailable

 

                    LePine, M Екатерина RNP   Unavailable         Unavailable

 

                    LePine, M Екатерина RNP   Unavailable         Unavailable

 

                    LePine, M Екатерина RNP   Unavailable         Unavailable

 

                    LePine, M Екатерина RNP   Unavailable         Unavailable

 

                    LePine, M Екатерина RNP   Unavailable         Unavailable

 

                    LePine, M Екатерина RNP   Unavailable         Unavailable

 

                    LePine, M Екатерина RNP   Unavailable         Unavailable

 

                    LePine, M Екатерина RNP   Unavailable         Unavailable

 

                    LePine, M Екатерина RNP   Unavailable         Unavailable

 

                    LePine, M Екатерина RNP   Unavailable         Unavailable

 

                    THIERNO MARC MD Unavailable         Unavailable

 

                    THIERNO MARC MD Unavailable         Unavailable

 

                    THIERNO MARC MD Unavailable         Unavailable

 

                    THIERNO MARC MD Unavailable         Unavailable

 

                    THIERNO MARC MD Unavailable         Unavailable

 

                    THIERNO MARC MD Unavailable         Unavailable

 

                    THIERNO MARC MD Unavailable         Unavailable

 

                    THIERNO MARC MD Unavailable         Unavailable

 

                    TARI, MELYNNE ROGER MD Unavailable         Unavailable

 

                    TARI, MELYNNE ROGER MD Unavailable         Unavailable

 

                    TARI, MELYNNE ROGER MD Unavailable         Unavailable

 

                    TARI, MELYNNE ROGER MD Unavailable         Unavailable

 

                    TARI, MELYNNE ROGER MD Unavailable         Unavailable

 

                    TARI, MELYNNE ROGER MD Unavailable         Unavailable

 

                    TARI, MELYNNE ROGER MD Unavailable         Unavailable

 

                    TARI, MELYNNE ROGER MD Unavailable         Unavailable

 

                    TARI, MELYNNE ROGER MD Unavailable         Unavailable

 

                    TARI, MELYNNE ROGER MD Unavailable         Unavailable

 

                    TARI, MELYNNE ROGER MD Unavailable         Unavailable

 

                    TARI, MELYNNE ROGER MD Unavailable         Unavailable

 

                    TARI, MELYNNE ROGER MD Unavailable         Unavailable

 

                    TARI, MELYNNE ROGER MD Unavailable         Unavailable

 

                    TARI, MELYNNE ROGER MD Unavailable         Unavailable

 

                    TARI, MELYNNE ROGER MD Unavailable         Unavailable

 

                    TARI, MELYNNE ROGER MD Unavailable         Unavailable

 

                    TARI, MELYNNE ROGER MD Unavailable         Unavailable

 

                    TARI, MELYNNE ROGER MD Unavailable         Unavailable

 

                    TARI, MELYNNE ROGER MD Unavailable         Unavailable

 

                    TARI, MELYNNE ROGER MD Unavailable         Unavailable

 

                    TARI, MELYNNE ROGER MD Unavailable         Unavailable

 

                    TARI, MELYNNE ROGER MD Unavailable         Unavailable

 

                    TARI, MELYNNE ROGER MD Unavailable         Unavailable

 

                    TARI, MELYNNE ROGER MD Unavailable         Unavailable

 

                    TARI, MELYNNE ROGER MD Unavailable         Unavailable

 

                    TARI, MELYNNE ROGER MD Unavailable         Unavailable

 

                    TARI, MELYNNE ROGER MD Unavailable         Unavailable

 

                    TARI, MELYNNE ROGER MD Unavailable         Unavailable

 

                    TARI, MELYNNE ROGER MD Unavailable         Unavailable

 

                    TARI, MELYNNE ROGER MD Unavailable         Unavailable

 

                    TARI, MELYNNE ROGER MD Unavailable         Unavailable



                                  



Re-disclosure Warning

          The records that you are about to access may contain information from 
federally-assisted alcohol or drug abuse programs. If such information is 
present, then the following federally mandated warning applies: This information
has been disclosed to you from records protected by federal confidentiality 
rules (42 CFR part 2). The federal rules prohibit you from making any further 
disclosure of this information unless further disclosure is expressly permitted 
by the written consent of the person to whom it pertains or as otherwise 
permitted by 42 CFR part 2. A general authorization for the release of medical 
or other information is NOT sufficient for this purpose. The Federal rules 
restrict any use of the information to criminally investigate or prosecute any 
alcohol or drug abuse patient.The records that you are about to access may 
contain highly sensitive health information, the redisclosure of which is 
protected by Article 27-F of the [facility] Public Health law. If you 
continue you may have access to information: Regarding HIV / AIDS; Provided by 
facilities licensed or operated by the [facility] Office of Mental Health; 
or Provided by the [facility] Office for People With Developmental 
Disabilities. If such information is present, then the following [place] 
mandated warning applies: This information has been disclosed to you from 
confidential records which are protected by state law. State law prohibits you 
from making any further disclosure of this information without the specific 
written consent of the person to whom it pertains, or as otherwise permitted by 
law. Any unauthorized further disclosure in violation of state law may result in
a fine or nursing home sentence or both. A general authorization for the release of 
medical or other information is NOT sufficient authorization for further disc
losure.                                                                         
    



Allergies and Adverse Reactions

          



           Type       Description Substance  Reaction   Status     Data Source(s

)

 

           Penicillin Penicillin Penicillin            active     [facility] ([facility])

 

           codeine    codeine    codeine               active     [facility] ([facility])

 

           chlorpromazine chlorpromazine chlorpromazine            active     NE

[facility] ([facility])



                                                                                
                           



Family History

          



             Family Member Name Family Member Gender Family Member Status Date o

f Status 

Description                             Data Source(s)

 

           Unknown    Unknown    Problem                          [facility] ([facility]

 

           Unknown    Male       Problem                          [facility] ([facility]

 

           Unknown    Male       Problem                          [facility] ([facility])

 

           Unknown    Male       Problem                          [facility] ([facility]

 

                                        () 



                                                                                
                                     



Encounters

          



           Encounter  Providers  Location   Date       Indications Data Source(s

)

 

           Outpatient Attender: Charu [facility] Main Office 2020 01:15:0

0 PM EST            

[facility] ([facility])

 

                                            2020 12:00:00 AM EST - 

020 08:17:58 AM EST            [facility] 

([facility])

 

                Outpatient      Attender: Екатерина Siegel 10/29

/2020 11:20:00 AM EDT

                                                    [facility] ([facility]

)

 

                Outpatient      Attender: Екатерина Zuluaga LISSY Siegel 09/10

/2020 01:40:00 PM EDT

                                                    [facility] ([facility]

)

 

                Outpatient      Attender: Екатерина Zuluaga LISSY Siegel 06/10

/2020 02:00:00 PM EDT

                                                    [facility] ([facility]

)

 

                Outpatient      Attender: Екатерина Zuluaga LISSY Siegel  01:00:00 PM EST

                                                    [facility] ([facility]

)

 

           Outpatient Attender: Charu [facility] Main [facility] 2020 12:30:0

0 PM EST            

[facility] ([facility])

 

                Outpatient      Attender: ROGER Flores/Sarabjit/Fredo/LORRAINE campo 2020 

12:30:00 PM EST                                     [facility] ([facility]

[facility]ice, )

 

                Outpatient      Attender: Екатерина Siegel  01:40:00 PM EST

                                                    [facility] ([facility]

)



                                                                                
                                                                                
      



Immunizations

          



             Vaccine      Date         Status       Description  Data Source(s)

 

                          INFLUENZA VACCINE QUADRIVALENT  (65 YR UP)/MF59

C.1/PF 10/07/2020 12:00:00

AM EDT              completed                               [facility]

 

                                        This CVX code allows reporting of a vacc

ination when formulation is unknown (for

example, when recording a Influenza vaccination when noted on a vaccination 
card)           10/05/2020 08:35:00 AM EDT completed                       [facility]

T ([facility])

 

             TB Skin test is not vaccine. 2020 10:44:00 AM EDT completed  

               [facility] 

([facility])

 

                          Influenza, injectable, MDCK, preservative free, dylan

valent 2019 02:11:00

PM EST              completed                               [facility]



                                                                                
                                     



Medications

          



          Medication Brand Name Start Date Product Form Dose      Route     Admi

nistrative 

Instructions Pharmacy Instructions Status     Indications Reaction   Description

 Data 

Source(s)

 

     Biaxin 500 MG Biaxin 2020 12:00:00 AM EST                          co

mpleted                

[facility] ([facility])

 

                    Ipratropium-Albuterol 0.5-2.5 (3) MG/3ML Ipratropium-Albuter

ol 2020 

12:00:00 AM EST                                    completed                    

  [facility] ([facility])

 

       Klor-Con M10 10 MEQ Klor-Con M10 2020 12:00:00 AM EST              

                      completed 

                                                            [facility] ([facility])

 

        Spironolactone 25 MG Spironolactone 2020 12:00:00 AM EST          

                               

completed                                                       [facility] ([facility])

 

           Clarithromycin 500 MG Oral Tablet Clarithromycin 2020 12:00:00 

AM EST                       

                              active                                  [facility] ([facility]

 

        torsemide 100 MG Oral Tablet Torsemide 2020 12:00:00 AM EST       

          ORAL                    

active                                                          [facility] ([facility])

 

          Acetaminophen 325 MG Oral Tablet Acetaminophen 2020 12:00:00 AM 

EST                               

                      active                                      [facility] ([facility]

 

      Xopenex 0.63 MG/3ML Xopenex 2020 12:00:00 AM EST                    

           completed              

                                        [facility] ([facility]

)

 

      Coumadin 3 MG Coumadin 2020 12:00:00 AM EST       3.0 {mg}          

         completed        

                                                    [facility] ([facility])

 

                    Albuterol Sulfate (2.5 MG/3ML) 0.083% Albuterol Sulfate    12:00:00 AM 

EST           0 {mg}                      completed                      [facility]

T ([facility])

 

     Tylenol Tylenol 2020 12:00:00 AM EST      325.0 {mg}                c

ompleted                

[facility] ([facility])

 

        Torsemide 100 MG Torsemide 2020 12:00:00 AM EST         1.0 {table

t}                         

completed                                                       [facility] ([facility])

 

        Multivitamin Multivitamin 2020 12:00:00 AM EST         1.0 {tablet

}                         

completed                                                       [facility] ([facility])

 

        Simvastatin 40 MG Simvastatin 2020 12:00:00 AM EST         40.0 {m

g}                         

completed                                                       [facility] ([facility])

 

        Levothyroxine Levothyroxine 2020 12:00:00 AM EST         25.0 {mcg

}                         

completed                                                       [facility] ([facility])

 

      Aricept 5 MG Aricept 2020 12:00:00 AM EST       5.0 {mg}            

       completed              

                                        [facility] ([facility]

)

 

             Incruse Ellipta 62.5 MCG/INH Incruse Ellipta 2020 12:00:00 AM

 EST              62.5 

{mcg}                           completed                         [facility] ([facility])

 

      Vitamin D Vitamin D 2020 12:00:00 AM EST       125.0 {mcg}          

         completed        

                                                    [facility] ([facility])

 

             Advair Diskus 100-50 MCG/DOSE Advair Diskus 2020 12:00:00 AM 

EST              0 {mcg}

                                completed                         [facility] ([facility])

 

     Colace Colace 2020 12:00:00 AM EST      100.0 {mg}                com

pleted                

[facility] ([facility])

 

     Calcium Calcium 2020 12:00:00 AM EST      0 {mg}                compl

eted                

[facility] ([facility])

 

                    Levalbuterol 0.21 MG/ML Inhalant Solution [Xopenex] Xopenex 

            10/26/2020 12:00:00 

AM EDT                                    active                      [facility] ([facility])

 

                    Culturelle Digestive Health Probiotic Culturelle Digestive H

ealth Probiotic 

10/23/2020 12:00:00 AM EDT                                    active            

          [facility] ([facility])

 

                Metronidazole 500 MG Oral Tablet METRONIDAZOLE   10/22/2020 12:0

0:00 AM EDT tablet

                9                               TAKE ONE TABLET BY MOUTH THREE T

IMES A DAY TAKE ONE TABLET BY MOUTH THREE 

TIMES A DAY  SOLD: 10/22/2020                                        [facility]

s

 

          500 mg              10/22/2020 12:00:00 AM EDT tablet    6            

       TAKE ONE TABLET BY MOUTH TWICE A 

DAY        TAKE ONE TABLET BY MOUTH TWICE A DAY SOLD: 10/22/2020                

                  [facility]

 

          62.5 mcg/actuation           2020 12:00:00 AM EDT blister with d

evice 30                  INHALE 

ONE PUFF BY MOUTH EVERY DAY INHALE ONE PUFF BY MOUTH EVERY DAY SOLD: 2020 

   

                                                            [facility]

 

          100-50 mcg/dose           2020 12:00:00 AM EDT blister with neida

ce 60                  INHALE ONE

 PUFF BY MOUTH TWICE A DAY INHALE ONE PUFF BY MOUTH TWICE A DAY SOLD: 2020

                                                                [facility]

 

          100 mg              2020 12:00:00 AM EDT tablet    60           

       TAKE ONE-HALF TABLET BY MOUTH 

TWO TIMES A DAY     TAKE ONE-HALF TABLET BY MOUTH TWO TIMES A DAY SOLD: 20

20     

                                                            [facility]

 

          100 mg              2020 12:00:00 AM EDT tablet    60           

       TAKE ONE-HALF TABLET BY MOUTH 

TWO TIMES A DAY     TAKE ONE-HALF TABLET BY MOUTH TWO TIMES A DAY SOLD: 20

20     

                                                            [facility]

 

          25 mcg              2020 12:00:00 AM EDT tablet    90           

       TAKE 1 TABLET BY MOUTH ONCE 

DAILY      TAKE 1 TABLET BY MOUTH ONCE DAILY SOLD: 2020                   

               [facility]

 

          5 mg                2020 12:00:00 AM EDT tablet    90           

       TAKE ONE TABLET BY MOUTH AT 

BEDTIME    TAKE ONE TABLET BY MOUTH AT BEDTIME SOLD: 2020                 

                 [facility]

 

                    Cholecalciferol 5000 UNT Oral Capsule Vitamin D3 Ultra Stren

gth 2020 

12:00:00 AM EST               ORAL                 active                      M

EDENT ([facility])

 

                          7 ACTUAT umeclidinium 0.0625 MG/ACTUAT Dry Powder Inha

ler [Incruse] Incruse 

Ellipta 2020 12:00:00 AM EST             RESPIRATORY             active   

                MEDENT 

([facility])

 

       Administration Of Flu Vaccine        2019 12:00:00 AM EST          

                          completed  

                                                            MED[facility] ([facility])

 

                                        Medication administered onsite 



                                                                                
                                                                                
                                                                                
                                                                                
                                                                              



Insurance Providers

          



             Payer name   Policy type / Coverage type Policy ID    Covered party

 ID Covered 

party's relationship to alcaraz Policy Alcaraz             Plan Information

 

          MEDICARE            [id number]           SP                  [id number]

[id number]

 

          UMR [facility]           [id number]           SP               

   [id number]

 

           FOR LIFE           [id number]           HU2                 [id number]

[id number]

 

          MEDICARE  C         [id number]           S                   [id number]

[id number]

 

          UMR       O         [id number]           S                   [id number]

 

           FOR LIFE O         [id number]           S                   [id number]

[id number]

 

          WPS  For Life Medigap Part B [id number]           Family Depende

nt           [id number]

 

          Medicare [employer] Medicare Primary [id number]           Self 

               [id number]

 

          [employer] (Old) Medigap Part B [id number]           Self               

 [id number]

 

          Umr (New [employer]) Medigap Part B [id number]           Self               

 [id number]

 

          WPS  For Life Medigap Part B [id number]           Family Depende

nt           [id number]

 

          Medicare [employer] Medicare Primary [id number]           Self 

               [id number]

 

           For Life Commercial [id number]           Family Dependent      

     [id number]

 

          Medicare  Medicare Primary [id number]           Self                2

[id number]

 

          Umr       Commercial [id number]           Self                [id number]

 

           For Life - WPS Medigap Part B [id number]           Family Depen

dent           [id number]

 

          [employer] PHCS Ppo Medigap Part B [id number]           Self                

[id number]

 

          Umr       Medigap Part B [id number]           Self                Y19

[id number]

 

          Medicare (Part B) Medicare Primary [id number]           Self         

       [id number]

 

          [employer]     Medigap Part B [id number]           Self                46439

0109

 

           For Life - [employer] Medigap Part B [id number]           Family Depen

dent           [id number]

 

          [employer] PHCS Ppo Medigap Part B [id number]           Self                

[id number]

 

          Umr       Medigap Part B [id number]           Self                Y19

[id number]

 

          Medicare (Part B) Medicare Primary [id number]           Self         

       [id number]

 

          WPS  For Life Medigap Part B [id number]           Family Depende

nt           [id number]

 

          Medicare [employer] Medicare Primary [id number]           Self 

               [id number]

 

          [employer]     Medigap Part B [id number]           Self                31613

0109

 

          WPS  For Life Medigap Part B [id number]           Family Depende

nt           [id number]

 

          Umr       Commercial [id number]           Self                [id number]

[id number]

 

          Medicare [facility] Medicare Primary [id number]           Self      

          [id number]

 

           For Life - WPS Medigap Part B [id number]           Family Depen

dent           [id number]

 

          [employer] PHCS Ppo Medigap Part B [id number]           Self                

[id number]

 

          Umr       Medigap Part B [id number]           Self                Y19

[id number]

 

          Medicare (Part B) Medicare Primary [id number]           Self         

       [id number]

 

           For Life Commercial [id number]           Family Dependent      

     [id number]

 

          Medicare  Medicare Primary [id number]           Self                2

[id number]

 

          MEDICARE            [id number]           SP                  [id number]

A

 

           For Life Commercial [id number]           Family Dependent      

     [id number]

 

          Medicare  Medicare Primary [id number]           Self                2

[id number]

 

           For Life - W[employer] Medigap Part B [id number]           Family Depen

dent           [id number]

 

          [employer] PHCS Ppo Medigap Part B [id number]           Self                

[id number]

 

          Umr       Medigap Part B [id number]           Self                Y19

[id number]

 

          Medicare (Part B) Medicare Primary [id number]           Self          

      [id number]

 

          [place] MEDICARE PART B C         [id number]           S               

    [id number]

 

          [place] [employer] (TFL) Medigap Part B [id number]           Self     

           [id number]

 

          [employer] (pr) Medigap Part B [id number]           Self                8900

03484

 

          Umr (pr)  Medigap Part B [id number]         

  Self                

[id number]

 

          Medicare Dme Supplies Medigap Part B [id number]           Self        

        [id number]

 

          Medicare [place] Medicare Primary [id number]           Self           

     [id number]

 

          [employer]               [id number]           SP                  [id number]

 

          WPS  For Life Medigap Part B [id number]           Family Depende

nt           [id number]

 

          Medicare [facility] Medicare Primary [id number]           Self       

         [id number]

 

          [employer]     Medigap Part B [id number]           Self                55129

0109

 

          WPS  For Life Medigap Part B 797774492           Family Depende

nt           [id number]

 

          Medicare [employer] Medicare Primary [id number]           Self  

              [id number]

 

          WPS  For Life Medigap Part B 060327399           Family Depende

nt           [id number]

 

          [employer]/Umr (Old) Medigap Part B [id number]           Self               

 [id number]

 

          Medicare [employer]l [employer] Medicare Primary [id number]           Self  

              [id number]

 

          [place] [employer] (TFL) Medigap Part B [id number]           Self     

           [id number]

 

          [employer] (pr) Medigap Part B [id number]           Self                8900

97412

 

          Umr (pr)  Medigap Part B [id number]         

  Self                

[id number]

 

          Medicare Dme Supplies Medigap Part B [id number]           Self        

        [id number]

 

          Medicare [place] Medicare Primary [id number]           Self           

     [id number]

 

           For Life - WPS Medigap Part B 564073465           Family Depen

dent           [id number]

 

          [employer] PHCS Ppo Medigap Part B [id number]           Self                

[id number]

 

          Medicare (Part B) Medicare Primary [id number]           Self          

      [id number]

 

          [place] [employer] (TFL) Medigap Part B [id number]           Self     

           [id number]

 

          [employer] (pr) Medigap Part B [id number]           Self                8900

74267

 

          Medicare Dme Supplies Medigap Part B [id number]           Self        

        [id number]

 

          Medicare [place] Medicare Primary [id number]           Self           

     [id number]

 

          [place] [employer] (TFL) Medigap Part B [id number]           Self     

           [id number]

 

          [employer] (pr) Medigap Part B 526375084           Self                8900

22232

 

          Medicare [place] Medicare Primary [id number]           Self           

     [id number]

 

          [place] [employer] (TFL) Medigap Part B [id number]           Self     

           [id number]

 

          [employer] (pr) Medigap Part B [id number]           Self                8900

04746

 

          Medicare [place] Medicare Primary [id number]           Self           

     [id number]

 

          [employer] PPO O         [id number]           S                   [id number]

 

          MEDICARE           [id number]           S                   [id number]

A

 

           For Life - WPS Medigap Part B 864112075           Family Depen

dent           [id number]

 

          [employer] PHCS Ppo Medigap Part B [id number]           Self                

[id number]

 

          Medicare (Part B) Medicare Primary [id number]           Self          

      [id number]

 

          WPS  For Life Medigap Part B 332040708           Family Depende

nt           [id number]

 

          Medicare [place] Medicare Primary [id number]           Self       

         [id number]

 

          WPS  For Life Medigap Part B 825631572           Family Depende

nt           [id number]

 

          Medicare Natl Govt Serv Medicare Primary [id number]           Self  

              [id number]

 

          Umr [employer] Ppo Medigap Part B [id number]           Self                8

[id number]

 

          WPS  For Life Medigap Part B 169217406           Family Depende

nt           [id number]

 

          Medicare [place] Medicare Primary [id number]           Self       

         [id number]

 

           For Life - WPS Medigap Part B 573692721           Family Depen

dent           [id number]

 

          [employer] PHCS Ppo Medigap Part B [id number]           Self                

[id number]

 

          Medicare (Part B) Medicare Primary [id number]           Self          

      [id number]

 

          WPS  For Life Medigap Part B 067508726           Family Depende

nt           [id number]

 

          Medicare [employer]l [employer] Medicare Primary [id number]           Self  

              [id number]

 

          WPS  For Life Medigap Part B 970403550           Family Depende

nt           [id number]

 

          Medicare [employer] Medicare Primary [id number]           Self  

              [id number]

 

          WPS  For Life Medigap Part B 274017453           Family Depende

nt           [id number]

 

          Medicare [employer] Medicare Primary [id number]           Self  

              [id number]

 

          [employer] Ppo Medigap Part B [id number]           Self                73856

0109

 

          WPS  For Life Medigap Part B 815549467           Family Depende

nt           [id number]

 

          Medicare [employer]ic Medicare Primary [id number]           Self  

              [id number]

 

          WPS  For Life Medigap Part B 004318514           Family Depende

nt           [id number]

 

          Medicare [employer] Medicare Primary [id number]           Self  

              [id number]

 

          WPS  For Life Medigap Part B 954863872           Family Depende

nt           [id number]

 

          Medicare [employer] Medicare Primary [id number]           Self  

              [id number]

 

          WPS  For Life Medigap Part B 622156920           Family Depende

nt           [id number]

 

          Medicare [employer] Medicare Primary [id number]           Self  

              [id number]

 

          WPS  For Life Medigap Part B 297554365           Family Depende

nt           [id number]

 

          Medicare [employer] Medicare Primary [id number]           Self  

              [id number]

 

          WPS  For Life Medigap Part B 004659523           Family Depende

nt           [id number]

 

          Medicare [employer] Medicare Primary [id number]           Self  

              [id number]

 

          WPS  For Life Medigap Part B 704542537           Family Depende

nt           [id number]

 

          Medicare Nat[employer] Gov[employer] Medicare Primary [id number]           Self  

              [id number]

 

          WPS  For Life Medigap Part B 108488758           Family Depende

nt           [id number]

 

          Medicare [employer] Medicare Primary [id number]           Self  

              [id number]

 

          WPS  For Life Medigap Part B 485431545           Family Depende

nt           [id number]

 

          Medicare Natl Govt Serv Medicare Primary [id number]           Self  

              [id number]

 

          WPS  For Life Medigap Part B 050947286           Family Depende

nt           [id number]

 

          Medicare [employer] Medicare Primary [id number]           Self  

              [id number]

 

          WPS  For Life Medigap Part B 208642207           Family Depende

nt           [id number]

 

          Medicare [employer]l Gov[employer] Medicare Primary [id number]           Self  

              [id number]

 

          WPS  For Life Medigap Part B 245860106           Family Depende

nt           [id number]

 

          Medicare [employer] Gov[employer] Medicare Primary [id number]           Self  

              [id number]

 

          WPS  For Life Medigap Part B 398782818           Family Depende

nt           [id number]

 

          Medicare Natl Gov[employer] Medicare Primary [id number]           Self  

              [id number]

 

          WPS  For Life Medigap Part B 164834677           Family Depende

nt           [id number]

 

          Medicare [employer] Medicare Primary [id number]           Self  

              [id number]

 

          WPS  For Life Medigap Part B                     Family Depende

nt            

 

          [employer] Ppo Medigap Part B [id number]                 Self                910

 

          Medicare Natl Govt Serv Medicare Primary                     Self   

              

 

           FOR LIFE           [id number]           [id number]                 [id number]

[id number]

 

                              [id number]                               [id number]

A

 

                              [id number]                               [id number]

 

                              [id number]                               [id number]



                                                                                
                                                                                
                                                                                
                                                                                
                                                                                
                                                                                
                                                                                
                                                                                
                                                                                
                                                                                
                                                                                
                                                                                
                                                                                
                                                                                
                                                                                
                                                                                
                                                                                
                                                



Problems, Conditions, and Diagnoses

          



           Code       Display Name Description Problem Type Effective Dates Data

 Source(s)

 

                          K57.92                    Diverticulitis of intestine,

 part unspecified, without perforation or 

abscess without bleeding                Diverticulitis of intestine, part unspec

ified, without 

perforation or abscess without bleeding Problem             10/20/2020 01:00:00 

AM RAJESH JOHNSON ([facility])

 

                          I13.0                     Hypertensive heart and chron

ic kidney disease with heart failure and stage

 1 through stage 4 chronic kidney disease, or unspecified chronic kidney disease
                                        Hypertensive heart and chronic kidney di

sease with heart failure and stage 1 

through stage 4 chronic kidney disease, or unspecified chronic kidney disease 

Problem                   10/20/2020 01:00:00 AM EDT [facility] ([facility])

 

                          I50.41                    Acute combined systolic (con

gestive) and diastolic (congestive) heart 

failure                                 Acute combined systolic (congestive) and

 diastolic (congestive) heart 

failure             Problem             10/20/2020 01:00:00 AM EDT [facility] ([facility])

 

                N18.31          Chronic kidney disease, stage 3a Chronic kidney 

disease, stage 3a Problem

                          10/20/2020 01:00:00 AM EDT [facility] ([facility])

 

             G30.9        Alzheimer's disease, unspecified Alzheimer's disease, 

unspecified Problem      

10/20/2020 01:00:00 AM EDT              [facility] ([facility]

)

 

                          F02.80                    Dementia in other diseases c

lassified elsewhere without behavioral 

disturbance                             Dementia in other diseases classified el

sewhere without behavioral 

disturbance         Problem             10/20/2020 01:00:00 AM EDT [facility] ([facility])

 

             I48.21       Permanent atrial fibrillation Permanent atrial fibrill

ation Problem      

10/20/2020 01:00:00 AM EDT              [facility] ([facility]

)

 

                    M19.90              Unspecified osteoarthritis, unspecified 

site Unspecified osteoarthritis, 

unspecified site    Problem             10/20/2020 01:00:00 AM EDT [facility] ([facility])

 

                    M81.0               Age-related osteoporosis without current

 pathological fracture Age-related

 osteoporosis without current pathological fracture Problem                   10

/ 01:00:00 

AM EDT                                  [facility] ([facility]

)

 

                    Z51.81              Encounter for therapeutic drug level mon

itoring Encounter for therapeutic

 drug level monitoring Problem             10/20/2020 01:00:00 AM EDT [facility] (

[facility])

 

                    Z79.01              Long term (current) use of anticoagulant

s Long term (current) use of 

anticoagulants      Problem             10/20/2020 01:00:00 AM EDT [facility] ([facility])

 

                    Z79.51              Long term (current) use of inhaled stero

ids Long term (current) use of 

inhaled steroids    Problem             10/20/2020 01:00:00 AM EDT [facility] ([facility])

 

           Z91.81     History of falling History of falling Problem    10/20/202

0 01:00:00 AM EDT 

[facility] ([facility])

 

             Z95.0        Presence of cardiac pacemaker Presence of cardiac pace

maker Problem      

10/20/2020 01:00:00 AM EDT              [facility] ([facility]

)

 

                Z95.2           Presence of prosthetic heart valve Presence of p

rosthetic heart valve 

Problem                   10/20/2020 01:00:00 AM EDT [facility] ([facility])

 

             G93.40       Encephalopathy, unspecified Encephalopathy, unspecifie

d Problem      

10/20/2020 01:00:00 AM EDT              [facility] ([facility]

)

 

                    J44.9               Chronic obstructive pulmonary disease, u

nspecified Chronic obstructive 

pulmonary disease, unspecified Problem             10/20/2020 01:00:00 AM EDT NE

[facility] 

([facility])

 

                    J44.1               Chronic obstructive pulmonary disease wi

th (acute) exacerbation Chronic 

obstructive pulmonary disease with (acute) exacerbation Problem                 

  10/03/2020 

01:00:00 AM EDT                         [facility] ([facility]

)



                                                                                
                                                                                
                                                                                
                            



Surgeries/Procedures

          



             Procedure    Description  Date         Indications  Data Source(s)

 

             ECG ROUTINE ECG W/LEAST 12 LDS W/I&R              2020 12:00:

00 AM EST              MED[facility] 

([facility])

 

             ECG ROUTINE ECG W/LEAST 12 LDS W/I&R              2020 12:00:

00 AM EST              [facility] 

([facility])



                                                                                
                  



Results

          



                    ID                  Date                Data Source

 

                    [id number]          2021 02:57:00 AM EST MED[facility] ([facility])









          Name      Value     Range     Interpretation Code Description Data Cinthya

rce(s) Supporting 

Document(s)

 

          Glucose, Fasting 96 mg/dL                          [facility] ([facility])  

 

          Blood Urea Nitrogen 24 mg/dL  7-18                          MED[facility] ([facility])  

 

          Glomerular Filtration Rate 49.0                                    MED

ENT ([facility])  

 

                                        <content>Units are mL/min/1.73 

m2</content><br/><content></content><br/><content>Chronic Kidney Disease Staging
 per NKF:</content><br/><content></content><br/><content>Stage I & II   GFR >=60
       Normal to Mildly Decreased</content><br/><content>Stage III      GFR 30-
59      Moderately Decreased</content><br/><content>Stage IV       GFR 15-29    
  Severely Decreased</content><br/><content>Stage V        GFR <15        Very 
Little GFR Left</content><br/><content>ESRD           GFR <15 on 
RRT</content><br/><content></content> 

 

          Sodium Level 138 meq/L 136-145                       [facility] ([facility])  

 

          Creatinine For GFR 1.13 mg/dL 0.55-1.30                     MED[facility] ([facility])  

 

          Potassium Serum 4.0 meq/L 3.5-5.1                       MEDENT ([facility])  

 

          Chloride Level 98 meq/L                          [facility] ([facility] [facility]  

 

          Carbon Dioxide Level 32 meq/L  21-32                         [facility] ([facility])  

 

          Calcium Level 9.8 mg/dL 8.8-10.2                      [facility] ([facility])  

 

          Anion Gap 8 meq/L   8-16                          [facility]  









                    ID                  Date                Data Source

 

                    [id number]          2021 02:57:00 AM EST [facility] ([facility])









          Name      Value     Range     Interpretation Code Description Data Cinthya

rce(s) Supporting 

Document(s)

 

          CPK Creatine Phosphokinase 57 U/L                            MED

ENT ([facility])  

 

          CK-MB Value Mass 1.1 ng/mL                               [facility]  

 

          Troponin I Laboratory test result                               [facility]

 ([facility])  

 

                                        <content>Troponin I Reference Interval f

or Siemens [place] 

LOCI:</content><br/><content></content><br/><content>99th Percentile= 0.00-0.045
 ng/ml</content><br/><content></content><br/><content>Risk 
Stratification:</content><br/><content><= 0.10 ng/ml   Decreased Risk for 
Adverse Clinical</content><br/><content>Events.</content><br/><content>0.10-1.50
 ng/ml   Increased Risk for Adverse Clinical</content><br/><content>Events. 
Evaluation of additional</content><br/><content>criterion and/or repeat testing 
in 2-6</content><br/><content>hours is suggested to rule out 
myocardial</content><br/><content>damage.</content><br/><content>>= 1.50 ng/ml  
 Indicative of Myocardial Injury.</content><br/><content></content> 

 

          MB/CK Relative Index 1.93                                    [facility] ([facility]  

 

                                        <content>DIAGNOSIS CRITERIA</content><br

/><content>MMB ng/ml       Relative 

Index (RI)</content><br/><content>NON-AMI               < or = 5               
N/A</content><br/><content>GRAY ZONE              > 5                < or = 
4</content><br/><content>AMI                    > 5                   > 
4</content><br/><content></content> 









                    ID                  Date                Data Source

 

                    [id number]          2021 02:57:00 AM EST [facility] ([facility])









          Name      Value     Range     Interpretation Code Description Data Cinthya

rce(s) Supporting 

Document(s)

 

          Prothrombin Time 26.7 s    12.5-14.3                     [facility] ([facility])  

 

          Inr       2.40                                    [facility] ([facility])  

 

                                        THERAPUTIC HUMAN INR VALUES

INDICATIONS                      NORMAL RANGES

PROPHYLAXIS/TREATMENT OF:

VENOUS THROMBOSIS                2.0-3.0

PULMONARY EMBOLISM               2.0-3.0

PREVENTION OF SYSTEMIC EMBOLISM FROM:

TISSUE HEART VALVES              2.0-3.0

ACUTE MYOCARDIAL INFARCTION      2.0-3.0

VALVULAR HEART DISEASE           2.0-3.0

ATRIAL FIBRILLATION              2.0-3.0

MECHANICAL VALVES(HIGH RISK)     2.5-3.5

RECURRENT MYOCARDIAL INFARCTION  2.5-3.5

 









                    ID                  Date                Data Source

 

                    [id number]          2021 02:57:00 AM EST [facility] ([facility])









          Name      Value     Range     Interpretation Code Description Data Cinthya

rce(s) Supporting 

Document(s)

 

          White Blood Count 9.4 10    4.0-10.0                      [facility] ([facility])  

 

          Red Blood Count 5.01 10   4.00-5.40                     [facility] ([facility])  

 

          Hemoglobin 14.6 g/dL 12.0-15.5                     [facility]  

 

          Hematocrit 45.9 %    36.0-47.0                     [facility]  

 

          Mean Corpuscular Volume 91.6 fl   80.0-96.0                     [facility]

 ([facility])  

 

          Mean Corpuscular Hemoglobin 29.1 pg   27.0-33.0                     [facility]

[facility] ([facility]) 

 

 

          Mean Corpuscular HGB Conc 31.8 g/dL 32.0-36.5                     [facility]

[facility] ([facility]) 

 

 

          Red Cell Distribution Width 14.9 %    11.5-14.5                     [facility]

[facility] ([facility])  

 

          Platelet Count, Automated 204 10    150-450                       [facility]

NT ([facility])  

 

          Neutrophils % 69.2 %    36.0-66.0                     [facility]  

 

          Lymph %   10.7 %    24.0-44.0                     [facility]  

 

          Mono %    16.9 %    0.0-5.0                       [facility]  

 

          Eos %     2.1 %     0.0-3.0                       [facility]  

 

          Immature Granulocyte % 0.5 %     0-3.0                         [facility] 

([facility])  

 

          Baso %    0.6 %     0.0-1.0                       [facility]  

 

          Nucleated Red Blood Cell % 0.0 %     0-0                           [facility]

[facility] ([facility])  

 

          Lymph #   1.0 10    1.5-5.0                       [facility]  

 

          Neutrophils # 6.5 10    1.5-8.5                       [facility]  

 

          Mono #    1.6 10    0.0-0.8                       [facility]  

 

          Baso #    0.1 10    0.0-0.2                       [facility]  

 

          Eos #     0.2 10    0.0-0.5                       [facility]  









                    ID                  Date                Data Source

 

                    [id number]                 20[facility] 12[facility]00:00 [facility]









          Name      Value     Range     Interpretation Code Description Data Cinthya

rce(s) Supporting 

Document(s)

 

          SARS-CoV2 Rapid Antigen Negative                                [facility]

     

 

                                        This lab was ordered by [facility], operated by [facility] and reported by [facility]. 









                    ID                  Date                Data Source

 

                    [id number]          2020 07:37:00 AM EST [facility] ([facility])









          Name      Value     Range     Interpretation Code Description Data Cinthya

rce(s) Supporting 

Document(s)

 

           Laboratory test finding (navigational concept) 0.01 ng/mL 0.00-0.08  

                      [facility] 

([facility])                   









                    ID                  Date                Data Source

 

                    [id number]          2020 07:35:00 AM EST [facility] ([facility])









          Name      Value     Range     Interpretation Code Description Data Cinthya

rce(s) Supporting 

Document(s)

 

           Laboratory test finding (navigational concept) 89 mg/dL        

                      [facility] 

([facility])                   

 

           Laboratory test finding (navigational concept) 40.0 %     38.0-51.0  

                      [facility] 

([facility])                   

 

           Laboratory test finding (navigational concept) 138 meq/L  136-145    

                      [facility] 

([facility])                   

 

           Laboratory test finding (navigational concept) 3.7 meq/L  3.5-5.1    

                      [facility] 

([facility])                   

 

           Laboratory test finding (navigational concept) 4.6 mg/dL  4.5-5.3    

                      [facility] 

([facility])                   

 

           Laboratory test finding (navigational concept) 98 meq/L        

                      [facility] 

([facility])                   

 

           Laboratory test finding (navigational concept) 32.0 MM/L  23.0-27.0  

                      [facility] 

([facility])                   

 

           Laboratory test finding (navigational concept) 1.1 mg/dL  0.6-1.3    

                      [facility] 

([facility])                   

 

           Laboratory test finding (navigational concept) 26 mg/dL   8-26       

                      [facility] 

([facility])                   









                    ID                  Date                Data Source

 

                    [id number]          2020 07:19:00 AM EST [facility] ([facility])









          Name      Value     Range     Interpretation Code Description Data Cinthya

rce(s) Supporting 

Document(s)

 

          Bedside Glucose 83 mg/dL                          [facility] ([facility])  









                    ID                  Date                Data Source

 

                    [id number]          2020 07:15:00 AM EST [facility] ([facility])









          Name      Value     Range     Interpretation Code Description Data Cinthya

rce(s) Supporting 

Document(s)

 

          White Blood Count 8.5 10    4.0-10.0                      [facility] ([facility])  

 

          Red Blood Count 4.35 10   4.00-5.40                     [facility] ([facility])  

 

          Hematocrit 40.6 %    36.0-47.0                     [facility]  

 

          Mean Corpuscular Volume 93.3 fl   80.0-96.0                     [facility]

 ([facility])  

 

          Hemoglobin 13.0 g/dL 12.0-15.5                     [facility])  

 

          Red Cell Distribution Width 15.2 %    11.5-14.5                     [facility]

[facility] ([facility])  

 

          Mean Corpuscular HGB Conc 32.0 g/dL 32.0-36.5                     [facility]

NT ([facility]) 

 

 

          Mean Corpuscular Hemoglobin 29.9 pg   27.0-33.0                     [facility]

[facility] ([facility]) 

 

 

          Platelet Count, Automated 194 10    150-450                       [facility]

NT ([facility])  

 

          Neutrophils % 65.5 %    36.0-66.0                     [facility] ([facility])  

 

          Lymph %   12.8 %    24.0-44.0                     [facility]  

 

          Mono %    18.0 %    0.0-5.0                       [facility]  

 

          Baso %    1.1 %     0.0-1.0                       [facility]  

 

          Eos %     2.2 %     0.0-3.0                       [facility]  

 

          Immature Granulocyte % 0.4 %     0-3.0                         [facility] 

([facility])  

 

          Nucleated Red Blood Cell % 0.0 %     0-0                           [facility]

[facility] ([facility])  

 

          Neutrophils # 5.5 10    1.5-8.5                       [facility] ([facility])  

 

          Eos #     0.2 10    0.0-0.5                       [facility]  

 

          Mono #    1.5 10    0.0-0.8                       [facility]  

 

          Lymph #   1.1 10    1.5-5.0                       [facility] ([facility])  

 

          Baso #    0.1 10    0.0-0.2                       [facility] ([facility])  









                    ID                  Date                Data Source

 

                    [id number]          2020 07:15:00 AM EST [facility] ([facility])









          Name      Value     Range     Interpretation Code Description Data Cinthya

rce(s) Supporting 

Document(s)

 

                          Thyrotropin [Units/volume] in Serum or Plasma by Detec

tion limit <= 0.05 mIU/L 

5.120 uIU/ML 0.358-3.740                            [facility] ([facility]

)  









                    ID                  Date                Data Source

 

                    [id number]          2020 07:15:00 AM EST [facility] ([facility])









          Name      Value     Range     Interpretation Code Description Data Cinthya

rce(s) Supporting 

Document(s)

 

          Prothrombin Time 26.5 s    12.5-14.3                     [facility]  

 

          Inr       2.38                                    [facility]  

 

                                        THERAPUTIC HUMAN INR VALUES

INDICATIONS                      NORMAL RANGES

PROPHYLAXIS/TREATMENT OF:

VENOUS THROMBOSIS                2.0-3.0

PULMONARY EMBOLISM               2.0-3.0

PREVENTION OF SYSTEMIC EMBOLISM FROM:

TISSUE HEART VALVES              2.0-3.0

ACUTE MYOCARDIAL INFARCTION      2.0-3.0

VALVULAR HEART DISEASE           2.0-3.0

ATRIAL FIBRILLATION              2.0-3.0

MECHANICAL VALVES(HIGH RISK)     2.5-3.5

RECURRENT MYOCARDIAL INFARCTION  2.5-3.5

 









                    ID                  Date                Data Source

 

                    [id number]          2020 02:30:00 PM EST [facility] ([facility])









          Name      Value     Range     Interpretation Code Description Data Cinthya

rce(s) Supporting 

Document(s)

 

          Glucose, Fasting 99 mg/dL                          [facility] ([facility]  

 

          Blood Urea Nitrogen 21 mg/dL  7-18                          [facility] ([facility])  

 

          Sodium Level 136 meq/L 136-145                       [facility]  

 

          Creatinine For GFR 1.07 mg/dL 0.55-1.30                     [facility] ([facility])  

 

          Glomerular Filtration Rate 52.1                                    [facility]

[facility] ([facility])  

 

                                        <content>Units are mL/min/1.73 

m2</content><br/><content></content><br/><content>Chronic Kidney Disease Staging
 per NKF:</content><br/><content></content><br/><content>Stage I & II   GFR >=60
       Normal to Mildly Decreased</content><br/><content>Stage III      GFR 30-
59      Moderately Decreased</content><br/><content>Stage IV       GFR 15-29    
  Severely Decreased</content><br/><content>Stage V        GFR <15        Very 
Little GFR Left</content><br/><content>ESRD           GFR <15 on 
RRT</content><br/><content></content> 

 

          Chloride Level 95 meq/L                          [facility] ([facility])  

 

          Potassium Serum 4.2 meq/L 3.5-5.1                       [facility] ([facility])  

 

          Carbon Dioxide Level 35 meq/L  21-32                         [facility] ([facility])  

 

          Calcium Level 10.1 mg/dL 8.8-10.2                      [facility] ([facility])  

 

          Anion Gap 6 meq/L   8-16                          [facility])  

 

          Alkaline Phosphatase 66 U/L                            [facility] ([facility])  

 

          Ast/Sgot  32 U/L    7-37                          [facility]

[facility])  

 

          Alt/SGPT  23 U/L    12-78                         [facility])  

 

          Total Protein 6.6 GM/DL 6.4-8.2                       [facility] ([facility])  

 

          Bilirubin,Total 0.7 mg/dL 0.2-1.0                       [facility] ([facility])  

 

          Albumin   3.7 GM/DL 3.2-5.2                       [facility]

[facility])  

 

          Albumin/Globulin Ratio 1.3       1.2-2.2                       [facility] 

([facility])  









                    ID                  Date                Data Source

 

                    [id number]          2020 02:30:00 PM EST [facility] ([facility])









          Name      Value     Range     Interpretation Code Description Data Cinthya

rce(s) Supporting 

Document(s)

 

          Hemoglobin 13.3 g/dL 12.0-15.5                     [facility] ([facility])  

 

          White Blood Count 7.8 10    4.0-10.0                      [facility] ([facility])  

 

          Red Blood Count 4.46 10   4.00-5.40                     [facility] ([facility])  

 

          Mean Corpuscular Volume 93.3 fl   80.0-96.0                     [facility]

 ([facility])  

 

          Mean Corpuscular Hemoglobin 29.8 pg   27.0-33.0                     [facility]

[facility] ([facility]) 

 

 

          Hematocrit 41.6 %    36.0-47.0                     [facility] ([facility]

[facility])  

 

          Platelet Count, Automated 231 10    150-450                       [facility]

NT ([facility])  

 

          Mean Corpuscular HGB Conc 32.0 g/dL 32.0-36.5                     [facility]

[facility] ([facility]) 

 

 

          Red Cell Distribution Width 15.9 %    11.5-14.5                     [facility]

[facility] ([facility])  

 

          Nucleated Red Blood Cell % 0.0 %     0-0                           MED

[facility] ([facility])  









                    ID                  Date                Data Source

 

                    [id number]          10/20/2020 09:34:00 AM EDT [facility] ([facility])









          Name      Value     Range     Interpretation Code Description Data Cinthya

rce(s) Supporting 

Document(s)

 

           Laboratory test finding (navigational concept) 0.02 ng/mL 0.00-0.08  

                      [facility] 

([facility])                   









                    ID                  Date                Data Source

 

                    [id number]          10/20/2020 09:29:00 AM EDT [facility] ([facility])









          Name      Value     Range     Interpretation Code Description Data Cinthya

rce(s) Supporting 

Document(s)

 

           Laboratory test finding (navigational concept) 0.90       0.4-2.0    

                      [facility] 

([facility])                   









                    ID                  Date                Data Source

 

                    [id number]          10/20/2020 09:28:00 AM EDT [facility] ([facility])









          Name      Value     Range     Interpretation Code Description Data Cinthya

rce(s) Supporting 

Document(s)

 

           Laboratory test finding (navigational concept) 41.0 %     38.0-51.0  

                      [facility] 

([facility])                   

 

           Laboratory test finding (navigational concept) 107 mg/dL       

                      [facility] 

([facility])                   

 

           Laboratory test finding (navigational concept) 136 meq/L  136-145    

                      [facility] 

([facility])                   

 

           Laboratory test finding (navigational concept) 5.1 mg/dL  4.5-5.3    

                      [facility] 

([facility])                   

 

           Laboratory test finding (navigational concept) 3.9 meq/L  3.5-5.1    

                      [facility] 

([facility])                   

 

           Laboratory test finding (navigational concept) 95 meq/L        

                      [facility] 

([facility])                   

 

           Laboratory test finding (navigational concept) 30 mg/dL   8-26       

                      [facility] 

([facility])                   

 

           Laboratory test finding (navigational concept) 30.0 MM/L  23.0-27.0  

                      [facility]                   

 

           Laboratory test finding (navigational concept) 1.2 mg/dL  0.6-1.3    

                      [facility]                   









                    ID                  Date                Data Source

 

                    [id number]          10/20/2020 09:28:00 AM EDT [facility] ([facility])









          Name      Value     Range     Interpretation Code Description Data Cinthya

rce(s) Supporting 

Document(s)

 

           Laboratory test finding (navigational concept) 31.3 s     12.1-14.4  

                      [facility] 

([facility])                   

 

           Laboratory test finding (navigational concept) 2.7                   

                      [facility]                              









                    ID                  Date                Data Source

 

                    [id number]          10/20/2020 08:53:00 AM EDT [facility] ([facility])









          Name      Value     Range     Interpretation Code Description Data Cinthya

rce(s) Supporting 

Document(s)

 

          White Blood Count 14.2 10   4.0-10.0                      [facility] ([facility])  

 

          Red Blood Count 4.35 10   4.00-5.40                     [facility] ([facility])  

 

          Hemoglobin 13.1 g/dL 12.0-15.5                     [facility]  

 

          Hematocrit 40.2 %    36.0-47.0                     [facility]  

 

          Mean Corpuscular Volume 92.4 fl   80.0-96.0                     [facility]  

 

          Mean Corpuscular Hemoglobin 30.1 pg   27.0-33.0                     [facility]

[facility] ([facility]) 

 

 

          Mean Corpuscular HGB Conc 32.6 g/dL 32.0-36.5                     [facility]

[facility] ([facility]) 

 

 

          Neutrophils % 75.0 %    36.0-66.0                     [facility] ([facility])  

 

          Platelet Count, Automated 178 10    150-450                       [facility]

[facility] ([facility])  

 

          Red Cell Distribution Width 15.3 %    11.5-14.5                     [facility]

[facility] ([facility])  

 

          Lymph %   5.6 %     24.0-44.0                     [facility]

[facility])  

 

          Mono %    17.9 %    0.0-5.0                       [facility]

[facility])  

 

          Eos %     0.5 %     0.0-3.0                       [facility]

[facility])  

 

          Nucleated Red Blood Cell % 0.0 %     0-0                           MED

[facility] ([facility])  

 

          Immature Granulocyte % 0.6 %     0-3.0                         [facility] 

([facility])  

 

          Baso %    0.4 %     0.0-1.0                       [facility]

[facility])  

 

          Neutrophils # 10.7 10   1.5-8.5                       [facility] ([facility])  

 

          Lymph #   0.8 10    1.5-5.0                       [facility])  

 

          Mono #    2.5 10    0.0-0.8                       [facility]

[facility])  

 

          Baso #    0.1 10    0.0-0.2                       [facility])  

 

          Eos #     0.1 10    0.0-0.5                       [facility])  









                    ID                  Date                Data Source

 

                    [id number]          10/20/2020 08:53:00 AM EDT [facility]









          Name      Value     Range     Interpretation Code Description Data Cinthya

rce(s) Supporting 

Document(s)

 

           Ammonia [Mass/volume] in Blood Laboratory test result                

                  [facility]                              









                    ID                  Date                Data Source

 

                    [id number]          10/20/2020 08:53:00 AM EDT [facility] ([facility])









          Name      Value     Range     Interpretation Code Description Data Cinthya

rce(s) Supporting 

Document(s)

 

          Color, Urine RFX Laboratory test result                               

[facility] ([facility])  

 

           Appearance, Urine RFX Laboratory test result                         

         [facility] ([facility])

                                         

 

           Specific Gravity Ur Auto RFX 1.012      1.002-1.035                  

     [facility] ([facility]                              

 

          PH,Urine RFX 6.0 units 5.0-9.0                       [facility] ([facility]  

 

           Glucose, Urine (Ua) Auto RFX Laboratory test result                  

                [facility] ([facility]                              

 

           Protein, Urine Auto RFX Laboratory test result                       

           [facility] ([facility]                              

 

           Ketone, Urine Auto RFX Laboratory test result                        

          [facility] ([facility])                              

 

           Urobilinogen, Urine Auto RFX 0.2 mg/dL  0.0-2.0                      

    [facility] ([facility])

                                         

 

           Bilirubin, Urine Auto RFX Laboratory test result                     

             [facility] ([facility])                              

 

           Nitrite, Urine Auto RFX Laboratory test result                       

           [facility] ([facility])                              

 

          WBC, Urine Auto RFX 1 /HPF    0-3                           [facility] ([facility])  

 

           Leukocyte Esterase Ur Auto RFX Laboratory test result                

                  [facility] ([facility])                              

 

           Blood, Urine Blood RFX Laboratory test result                        

          [facility] ([facility])                              

 

          RBC, Urine Auto RFX 1 /HPF    0-3                           [facility]  

 

           Bacteria, Urine Auto RFX Laboratory test result                      

            [facility] ([facility])                              

 

          Squam Epithelial Cell Ur Aurfx 0 /HPF    0-6                          

 [facility] ([facility])  

 

          Hyaline Cast, Urine Auto RFX 0 /LPF    0-1                           [facility]

[facility]  









                    ID                  Date                Data Source

 

                    [id number]          10/20/2020 08:53:00 AM EDT [facility] ([facility])









          Name      Value     Range     Interpretation Code Description Data Cinthya

rce(s) Supporting 

Document(s)

 

          CPK Creatine Phosphokinase 62 U/L                            [facility]

[facility] ([facility])  

 

          Troponin I Laboratory test result                               [facility]

 ([facility])  

 

                                        <content>Troponin I Reference Interval f

or Siemens [place] 

LOCI:</content><br/><content></content><br/><content>99th Percentile= 0.00-0.045
 ng/ml</content><br/><content></content><br/><content>Risk 
Stratification:</content><br/><content><= 0.10 ng/ml   Decreased Risk for 
Adverse Clinical</content><br/><content>Events.</content><br/><content>0.10-1.50
 ng/ml   Increased Risk for Adverse Clinical</content><br/><content>Events. 
Evaluation of additional</content><br/><content>criterion and/or repeat testing 
in 2-6</content><br/><content>hours is suggested to rule out 
myocardial</content><br/><content>damage.</content><br/><content>>= 1.50 ng/ml  
 Indicative of Myocardial Injury.</content><br/><content></content> 

 

          MB/CK Relative Index 1.61                                    [facility] ([facility]  

 

                                        <content>DIAGNOSIS CRITERIA</content><br

/><content>MMB ng/ml       Relative 

Index (RI)</content><br/><content>NON-AMI               < or = 5               
N/A</content><br/><content>GRAY ZONE              > 5                < or = 
4</content><br/><content>AMI                    > 5                   > 
4</content><br/><content></content> 

 

          CK-MB Value Mass Laboratory test result                               

[facility] ([facility])  









                    ID                  Date                Data Source

 

                    [id number]          10/20/2020 08:53:00 AM EDT [facility] ([facility])









          Name      Value     Range     Interpretation Code Description Data Cinthya

rce(s) Supporting 

Document(s)

 

          Ast/Sgot  23 U/L    7-37                          [facility] ([facility])  

 

          Alkaline Phosphatase 72 U/L                            [facility]  

 

          Alt/SGPT  24 U/L    12-78                         [facility] ([facility])  

 

          Total Protein 6.7 GM/DL 6.4-8.2                       [facility] ([facility])  

 

          Bilirubin,Total 1.2 mg/dL 0.2-1.0                       [facility] ([facility])  

 

          Bilirubin,Direct 0.4 mg/dL 0.0-0.2                       [facility] ([place]

[facility])  

 

          Albumin   3.7 GM/DL 3.2-5.2                       [facility]  

 

          Albumin/Globulin Ratio 1.2       1.2-2.2                       [facility] 

([facility])  









                    ID                  Date                Data Source

 

                    [id number]          10/20/2020 08:53:00 AM EDT [facility]









          Name      Value     Range     Interpretation Code Description Data Cinthya

rce(s) Supporting 

Document(s)

 

                    Lipoprotein lipase [Enzymatic activity/volume] in Serum or P

lasma 157 U/L             

                                                [facility] ([facility])  

 

                          Thyrotropin [Units/volume] in Serum or Plasma by Detec

tion limit <= 0.05 mIU/L 

3.480 uIU/ML 0.358-3.740                            [facility] ([facility]

)  









                    ID                  Date                Data Source

 

                    [id number]          10/20/2020 08:53:00 AM EDT [facility]









          Name      Value     Range     Interpretation Code Description Data Cinthya

rce(s) Supporting 

Document(s)

 

          Blood Type Laboratory test result                               [facility]

 ([facility])  

 

           AB Screen (Indirect Anatoly)Vis Laboratory test result                

                  [facility] ([facility]                              









                    ID                  Date                Data Source

 

                    [id number]          09/10/2020 01:37:00 PM EDT [facility]









          Name      Value     Range     Interpretation Code Description Data Cinthya

rce(s) Supporting 

Document(s)

 

                          Thyrotropin [Units/volume] in Serum or Plasma by Detec

tion limit <= 0.05 mIU/L 

3.19 uIU/mL  0.36-3.74                              [facility] ([facility]

)  









                    ID                  Date                Data Source

 

                    [id number]          09/10/2020 01:37:00 PM EDT [facility]









          Name      Value     Range     Interpretation Code Description Data Cinthya

rce(s) Supporting 

Document(s)

 

           Urea nitrogen [Mass/volume] in Serum or Plasma 24 mg/dL   7-18       

                      [facility] 

([facility])                   

 

           Glucose [Mass/volume] in Serum or Plasma 100 mg/dL  74-99            

                [facility] ([facility]                              

 

                                        100-125 mg/dL     PRE-DIABETES/FASTING

>126 mg/dL          DIABETES/FASTING

 

 

          Creatinine 1.4 mg/dL 0.6-1.3                       [facility]  

 

           Sodium [Moles/volume] in Serum or Plasma 139 meq/L  136-145          

                [facility]                             

 

           Chloride [Moles/volume] in Serum or Plasma 99 meq/L            

                  [facility]                             

 

           Potassium [Moles/volume] in Serum or Plasma 4.3 meq/L  3.5-5.1       

                   [facility] 

([facility])                   

 

           Carbon dioxide, total [Moles/volume] in Serum or Plasma 36 meq/L   21

-32                            

[facility] ([facility])            

 

                                        Glomerular filtration rate/1.73 sq M pre

dicted among non-blacks [Volume 

Rate/Area] in Serum or Plasma by Creatinine-based formula (MDRD) 36 mL/min      

                                  

                          [facility] ([facility])  

 

           Calcium [Mass/volume] in Serum or Plasma 9.9 mg/dL  8.5-10.1         

                [facility] 

([facility])                   

 

                                        Glomerular filtration rate/1.73 sq M pre

dicted among blacks [Volume Rate/Area] 

in Serum or Plasma by Creatinine-based formula (MDRD) 44 mL/min                 

                          [facility] 

([facility])                   

 

                                        <content>CHRONIC KIDNEY DISEASE STAGING 

PER 

NKF</content><br/><content></content><br/><content>STAGE I & II      GFR >= 60  
     NORMAL TO MILDLY DECREASED</content><br/><content>STAGE III          GFR 
30-59          MODERATELY DECREASED</content><br/><content>STAGE IV           
GFR 15-29         SEVERELY DECREASED</content><br/><content>STAGE V            
GFR <15            VERY LITTLE GFR LEFT</content><br/><content>ESRD             
   GFR <15            ON RRT</content><br/><content></content> 









                    ID                  Date                Data Source

 

                    [id number]          09/10/2020 01:37:00 PM EDT [facility] ([facility])









          Name      Value     Range     Interpretation Code Description Data Cinthya

rce(s) Supporting 

Document(s)

 

           Hemoglobin [Mass/volume] in Blood 14.8 g/dL  12.0-18.0               

         [facility] ([facility]                              

 

           Erythrocytes [#/volume] in Blood by Automated count 5.02 x10*6/UL 4.2

0-6.30                        

[facility] ([facility])            

 

           Leukocytes [#/volume] in Blood by Automated count 7.4 x10*3/UL 4.1-10

.9                         

[facility] ([facility])            

 

          MCV       88.5 fL   80.0-97.0                     [facility])  

 

           Hematocrit [Volume Fraction] of Blood by Automated count 44.4 %     3

7.0-51.0                        

[facility] ([facility])            

 

          MCH       29.4 pg   26.0-32.0                     [facility])  

 

          MCHC      33.3 g/dL 31.0-38.0                     [facility])  

 

           Erythrocyte distribution width [Ratio] by Automated count 14.3 %     

11.6-13.7                        

[facility] ([facility])            

 

           Platelets [#/volume] in Blood by Automated count 208 x10*3/-440

                          [facility]

 ([facility])                  

 

          Lymph %   16.7 %    10.0-58.5                     [facility])  

 

          MPV       8.8 FL    7.8-11.0                      [facility])  

 

          Mid %     4.6 %     1.7-9.3                       [facility])  

 

          Neut %    78.7 %    37.0-92.0                     [facility])  

 

          Lymph #   1.2 x10*3/UL 0.6-4.1                       [facility]  

 

          Mid #     0.4 x10*3/UL 0.1-0.6                       [facility]  

 

          Neut #    5.8 x10*3/UL 2.0-7.8                       [facility]  









                    ID                  Date                Data Source

 

                    [id number]            09/10/2020 01:37:00 PM EDT [facility] ([facility])









          Name      Value     Range     Interpretation Code Description Data Cinthya

rce(s) Supporting 

Document(s)

 

           Leukocytes [#/volume] in Blood by Automated count 7.4 x10*3/UL 4.1-10

.9                         

[facility] ([facility])    

 

           Erythrocytes [#/volume] in Blood by Automated count 5.02 x10*6/UL 4.2

0-6.30                        

[facility] ([facility])    

 

           Hemoglobin [Mass/volume] in Blood 14.8 g/dL  12.0-18.0               

         [facility] ([facility])                       

 

           Hematocrit [Volume Fraction] of Blood by Automated count 44.4 %     3

7.0-51.0                        

[facility] ([facility])    

 

          MCV       88.5 fL   80.0-97.0                     [facility] ([facility])  

 

          MCH       29.4 pg   26.0-32.0                     [facility] ([facility]

[facility])  

 

          MCHC      33.3 g/dL 31.0-38.0                     [facility] ([facility]

[facility])  

 

           Erythrocyte distribution width [Ratio] by Automated count 14.3 %     

11.6-13.7                        

[facility] ([facility])    

 

           Platelets [#/volume] in Blood by Automated count 208 x10*3/-440

                          [facility]

 ([facility])          

 

             Platelet mean volume [Entitic volume] in Blood by Krishan-Karen 8.8 FL

       7.8-11.0                   

                          [facility] ([facility])  

 

           Lymphocytes/100 leukocytes in Blood by Automated count 16.7 %     10.

0-58.5                        

[facility] ([facility])    

 

          Mid %     4.6 %     1.7-9.3                       [facility] ([facility]

[facility])  

 

          Neut %    78.7 %    37.0-92.0                     [facility] ([facility]

[facility])  

 

          Mid #     0.4 x10*3/UL 0.1-0.6                       [facility] ([facility]

[facility])  

 

          Lymph #   1.2 x10*3/UL 0.6-4.1                       [facility] ([facility]

[facility])  

 

           Neutrophils [#/volume] in Semen by Manual count 5.8 x10*3/UL 2.0-7.8 

                         [facility] 

([facility])           









                    ID                  Date                Data Source

 

                    [id number]          2020 10:56:00 AM EDT [facility] ([facility])









          Name      Value     Range     Interpretation Code Description Data Cinthya

rce(s) Supporting 

Document(s)

 

           INR in Platelet poor plasma by Coagulation assay 3.1                 

                        [facility] ([facility]                              









                    ID                  Date                Data Source

 

                    [id number]          2020 04:04:00 PM EDT [facility] ([facility])









          Name      Value     Range     Interpretation Code Description Data Cinthya

rce(s) Supporting 

Document(s)

 

           INR in Platelet poor plasma by Coagulation assay 2.5                 

                        [facility] ([facility]                              









                    ID                  Date                Data Source

 

                    [id number]          06/10/2020 01:38:00 PM EDT [facility] ([facility])









          Name      Value     Range     Interpretation Code Description Data Cinthya

rce(s) Supporting 

Document(s)

 

          Magnesium 2.1 mg/dL 1.8-2.4                       [facility]  









                    ID                  Date                Data Source

 

                    [id number]          06/10/2020 01:38:00 PM EDT [facility] ([facility])









          Name      Value     Range     Interpretation Code Description Data Cinthya

rce(s) Supporting 

Document(s)

 

           Glucose [Mass/volume] in Serum or Plasma 76 mg/dL   74-99            

                [facility] ([facility]                              

 

                                        100-125 mg/dL     PRE-DIABETES/FASTING

>126 mg/dL          DIABETES/FASTING

 

 

          Creatinine 1.2 mg/dL 0.6-1.3                       [facility] ([facility])  

 

           Sodium [Moles/volume] in Serum or Plasma 144 meq/L  136-145          

                [facility]                             

 

           Urea nitrogen [Mass/volume] in Serum or Plasma 22 mg/dL   7-18       

                      [facility] 

([facility])                   

 

           Potassium [Moles/volume] in Serum or Plasma 4.2 meq/L  3.5-5.1       

                   [facility] 

([facility])                   

 

           Carbon dioxide, total [Moles/volume] in Serum or Plasma 33 meq/L   21

-32                            

[facility] ([facility])            

 

           Chloride [Moles/volume] in Serum or Plasma 104 meq/L           

                  [facility] 

([facility])                   

 

                                        Glomerular filtration rate/1.73 sq M pre

dicted among non-blacks [Volume 

Rate/Area] in Serum or Plasma by Creatinine-based formula (MDRD) 43 mL/min      

                                  

                          [facility] ([facility])  

 

           Calcium [Mass/volume] in Serum or Plasma 9.7 mg/dL  8.5-10.1         

                [facility] 

([facility])                   

 

                                        Glomerular filtration rate/1.73 sq M pre

dicted among blacks [Volume Rate/Area] 

in Serum or Plasma by Creatinine-based formula (MDRD) 52 mL/min                 

                          [facility] 

([facility])                   

 

                                        <content>CHRONIC KIDNEY DISEASE STAGING 

PER 

NKF</content><br/><content></content><br/><content>STAGE I & II      GFR >= 60  
     NORMAL TO MILDLY DECREASED</content><br/><content>STAGE III          GFR 
30-59          MODERATELY DECREASED</content><br/><content>STAGE IV           
GFR 15-29         SEVERELY DECREASED</content><br/><content>STAGE V            
GFR <15            VERY LITTLE GFR LEFT</content><br/><content>ESRD             
   GFR <15            ON RRT</content><br/><content></content> 









                    ID                  Date                Data Source

 

                    [id number]          06/10/2020 01:38:00 PM EDT [facility] ([facility])









          Name      Value     Range     Interpretation Code Description Data Cinthya

rce(s) Supporting 

Document(s)

 

           Leukocytes [#/volume] in Blood by Automated count 8.1 x10*3/UL 4.1-10

.9                         

[facility] ([facility])            

 

           Hemoglobin [Mass/volume] in Blood 14.8 g/dL  12.0-18.0               

         [facility] ([facility]                              

 

           Erythrocytes [#/volume] in Blood by Automated count 5.07 x10*6/UL 4.2

0-6.30                        

[facility] ([facility]            

 

           Hematocrit [Volume Fraction] of Blood by Automated count 44.9 %     3

7.0-51.0                        

[facility] ([facility]            

 

          MCHC      33.1 g/dL 31.0-38.0                     [facility] ([facility]

[facility])  

 

          MCH       29.3 pg   26.0-32.0                     [facility] ([facility])  

 

          MCV       88.5 fL   80.0-97.0                     [facility] ([facility])  

 

          MPV       8.7 FL    7.8-11.0                      [facility] ([facility])  

 

           Erythrocyte distribution width [Ratio] by Automated count 14.2 %     

11.6-13.7                        

[facility] ([facility]            

 

           Platelets [#/volume] in Blood by Automated count 206 x10*3/-440

                          [facility]

 ([facility])                  

 

          Lymph %   16.9 %    10.0-58.5                     [facility]  

 

          Mid %     4.7 %     1.7-9.3                       [facility]  

 

          Neut %    78.4 %    37.0-92.0                     [facility]  

 

          Lymph #   1.3 x10*3/UL 0.6-4.1                       [facility]  

 

          Neut #    6.3 x10*3/UL 2.0-7.8                       [facility]  

 

          Mid #     0.5 x10*3/UL 0.1-0.6                       [facility]  









                    ID                  Date                Data Source

 

                    [id number]          2020 01:59:00 PM EST [facility] ([facility])









          Name      Value     Range     Interpretation Code Description Data Cinthya

rce(s) Supporting 

Document(s)

 

          Lymphocytes 5 %       16-44                         [facility]  

 

          Neutrophils 70 %      28-66                         [facility]  

 

          Eosinophils 4 %       0-3                           [facility]  

 

          Monocytes 2 %       0-5                           [facility]  

 

          Basophils 2 %       0-1                           [facility]  

 

          Atypical Lymph 17 %      0-5                           [facility] ([facility])  

 

          Poikilocytosis Laboratory test result                               [facility]

[facility] ([facility])  

 

          Ovalocytes Laboratory test result                               [facility]

 ([facility])  

 

          Platelet Estimate Laboratory test result                              

 [facility] ([facility])  









                    ID                  Date                Data Source

 

                    [id number]          2020 01:59:00 PM EST [facility] ([facility])









          Name      Value     Range     Interpretation Code Description Data Cinthya

rce(s) Supporting 

Document(s)

 

           Glucose [Mass/volume] in Serum or Plasma 104 mg/dL  74-99            

                [facility] ([facility])                              

 

                                        100-125 mg/dL     PRE-DIABETES/FASTING

>126 mg/dL          DIABETES/FASTING

 

 

           Urea nitrogen [Mass/volume] in Serum or Plasma 19 mg/dL   7-18       

                      [facility] 

([facility])                   

 

          Creatinine 1.2 mg/dL 0.6-1.3                       [facility] ([facility]

[facility])  

 

           Sodium [Moles/volume] in Serum or Plasma 144 meq/L  136-145          

                [facility] ([facility]                             

 

           Potassium [Moles/volume] in Serum or Plasma 4.0 meq/L  3.5-5.1       

                   [facility] 

([facility])                   

 

           Chloride [Moles/volume] in Serum or Plasma 102 meq/L           

                  [facility] 

([facility])                   

 

                                        Glomerular filtration rate/1.73 sq M pre

dicted among non-blacks [Volume 

Rate/Area] in Serum or Plasma by Creatinine-based formula (MDRD) 43 mL/min      

                                  

                          [facility] ([facility]  

 

           Carbon dioxide, total [Moles/volume] in Serum or Plasma 36 meq/L   21

-32                            

[facility] ([facility]            

 

           Calcium [Mass/volume] in Serum or Plasma 10.0 mg/dL 8.5-10.1         

                [facility] 

([facility])                   

 

                                        Glomerular filtration rate/1.73 sq M pre

dicted among blacks [Volume Rate/Area] 

in Serum or Plasma by Creatinine-based formula (MDRD) 52 mL/min                 

                          [facility] 

([facility])                   

 

                                        <content>CHRONIC KIDNEY DISEASE STAGING 

PER 

NKF</content><br/><content></content><br/><content>STAGE I & II      GFR >= 60  
     NORMAL TO MILDLY DECREASED</content><br/><content>STAGE III          GFR 
30-59          MODERATELY DECREASED</content><br/><content>STAGE IV           
GFR 15-29         SEVERELY DECREASED</content><br/><content>STAGE V            
GFR <15            VERY LITTLE GFR LEFT</content><br/><content>ESRD             
   GFR <15            ON RRT</content><br/><content></content> 









                    ID                  Date                Data Source

 

                    [id number]          2020 01:59:00 PM EST [facility] ([facility])









          Name      Value     Range     Interpretation Code Description Data Cinthya

rce(s) Supporting 

Document(s)

 

           Leukocytes [#/volume] in Blood by Automated count 8.0 x10*3/UL 4.1-10

.9                         

[facility] ([facility]            

 

           Hemoglobin [Mass/volume] in Blood 14.0 g/dL  12.0-18.0               

         [facility] ([facility]                              

 

           Erythrocytes [#/volume] in Blood by Automated count 4.88 x10*6/UL 4.2

0-6.30                        

[facility] ([facility])            

 

          MCV       87.4 fL   80.0-97.0                     [facility]

[facility])  

 

           Hematocrit [Volume Fraction] of Blood by Automated count 42.7 %     3

7.0-51.0                        

[facility] ([facility])            

 

          MCHC      32.9 g/dL 31.0-38.0                     [facility] ([facility]

[facility])  

 

          MCH       28.7 pg   26.0-32.0                     [facility] ([facility])  

 

           Platelets [#/volume] in Blood by Automated count 207 x10*3/-440

                          [facility]

 ([facility]                  

 

           Erythrocyte distribution width [Ratio] by Automated count 14.3 %     

11.6-13.7                        

[facility] ([facility])            

 

          MPV       8.3 FL    7.8-11.0                      [facility] ([facility]

[facility])  

 

          Lymph %   8.8 %     10.0-58.5                     [facility] ([facility])  

 

                                        NOTE:

MANUAL DIFFERENTIAL SENT TO [facility] FOR VERIFICATION.





 

 

          Mid %     10.9 %    1.7-9.3                       [facility] ([facility])  

 

          Lymph #   0.7 x10*3/UL 0.6-4.1                       [facility] ([facility])  

 

          Neut %    80.3 %    37.0-92.0                     [facility] ([facility]

[facility])  

 

          Neut #    6.4 x10*3/UL 2.0-7.8                       [facility] ([facility])  

 

          Mid #     0.9 x10*3/UL 0.1-0.6                       [facility] ([facility])  









                    ID                  Date                Data Source

 

                    [id number]          2019 03:42:00 PM EST [facility] ([facility])









          Name      Value     Range     Interpretation Code Description Data Cinthya

rce(s) Supporting 

Document(s)

 

           Bacteria identified in Urine by Culture FULL REPORT IN L <SEE NOTE>  

                                [facility]

 ([facility])                  

 

                                        FULL REPORT IN LAB NOTES ([facility] and [facility]

).

NO GROWTH



 









                    ID                  Date                Data Source

 

                    [id number]          2019 03:42:00 PM EST [facility] ([facility])









          Name      Value     Range     Interpretation Code Description Data Cinthya

rce(s) Supporting 

Document(s)

 

          Urine PH  6.0 units 5.0-9.0                       [facility] ([facility]

[facility])  

 

           Urine Appearance SL. HAZY              Abnormal (applies to non-numer

ic results)            [facility] 

([facility])                   

 

           Urine Color STRAW                 Abnormal (applies to non-numeric re

sults)            [facility] 

([facility])                   

 

           Urine Blood TRACE                 Abnormal (applies to non-numeric re

sults)            [facility] 

([facility]                   

 

          Urine Leukocytes NEGATIVE                                [facility]

[facility]  

 

          Specific gravity of Urine 1.015     1.005-1.030                     ME

[facility] ([facility])  

 

          Glucose [Presence] in Urine NEGATIVE mg/dL                            

   [facility] ([facility])  

 

          Urine Protein NEGATIVE  0-0                           [facility] ([facility])  

 

          Urine Nitrite NEGATIVE                                [facility] ([facility])  

 

          Urine Urobilinogen 0.2 mg/dL 0.2-1.0                       [facility] ([facility])  

 

          [facility]rine Ketone NEGATIVE mg/dL                               [facility]  

 

           Bilirubin.total [Mass/volume] in Serum or Plasma NEGATIVE            

                        [facility] 

([facility])                   









                    ID                  Date                Data Source

 

                    [id number]          2019 03:42:00 PM EST [facility]









          Name      Value     Range     Interpretation Code Description Data [facility] Supporting 

Document(s)

 

           Glucose [Mass/volume] in Serum or Plasma 133 mg/dL  74-99            

                [facility] ([facility])                              

 

                                        100-125 mg/dL     PRE-DIABETES/FASTING

>126 mg/dL          DIABETES/FASTING

 

 

           Urea nitrogen [Mass/volume] in Serum or Plasma 20 mg/dL   7-18       

                      [facility] 

([facility])                   

 

           Sodium [Moles/volume] in Serum or Plasma 141 meq/L  136-145          

                [facility] ([facility])                             

 

          Creatinine 1.3 mg/dL 0.6-1.3                       [facility] ([facility]

[facility])  

 

           Potassium [Moles/volume] in Serum or Plasma 3.7 meq/L  3.5-5.1       

                   [facility] 

([facility])                   

 

           Carbon dioxide, total [Moles/volume] in Serum or Plasma 36 meq/L   21

-32                            

[facility] ([facility])            

 

           Chloride [Moles/volume] in Serum or Plasma 99 meq/L            

                  [facility] ([facility])                             

 

                                        Glomerular filtration rate/1.73 sq M pre

dicted among non-blacks [Volume 

Rate/Area] in Serum or Plasma by Creatinine-based formula (MDRD) 39 mL/min      

                                  

                          [facility] ([facility])  

 

                                        Glomerular filtration rate/1.73 sq M pre

dicted among blacks [Volume Rate/Area] 

in Serum or Plasma by Creatinine-based formula (MDRD) 48 mL/min                 

                          [facility] 

([facility])                   

 

                                        <content>CHRONIC KIDNEY DISEASE STAGING 

PER 

NKF</content><br/><content></content><br/><content>STAGE I & II      GFR >= 60  
     NORMAL TO MILDLY DECREASED</content><br/><content>STAGE III          GFR 
30-59          MODERATELY DECREASED</content><br/><content>STAGE IV           
GFR 15-29         SEVERELY DECREASED</content><br/><content>STAGE V            
GFR <15            VERY LITTLE GFR LEFT</content><br/><content>ESRD             
   GFR <15            ON RRT</content><br/><content></content> 

 

           Calcium [Mass/volume] in Serum or Plasma 10.0 mg/dL 8.5-10.1         

                [facility] 

([facility])                   









                    ID                  Date                Data Source

 

                    [id number]            2019 03:42:00 PM EST [facility] ([facility])









          Name      Value     Range     Interpretation Code Description Data Cinthya

rce(s) Supporting 

Document(s)

 

           Urea nitrogen [Mass/volume] in Serum or Plasma 20 mg/dL   7-18       

                      [facility] 

([facility])           

 

           Glucose [Mass/volume] in Serum or Plasma 133 mg/dL  74-99            

                [facility] ([facility]                       

 

                                        100-125 mg/dL     PRE-DIABETES/FASTING

>126 mg/dL          DIABETES/FASTING



 

 

           Sodium [Moles/volume] in Serum or Plasma 141 meq/L  136-145          

                [facility] 

([facility])           

 

          Creatinine 1.3 mg/dL 0.6-1.3                       [facility] ([facility]  

 

           Potassium [Moles/volume] in Serum or Plasma 3.7 meq/L  3.5-5.1       

                   [facility] 

([facility])           

 

           Chloride [Moles/volume] in Serum or Plasma 99 meq/L            

                  [facility] 

([facility])           

 

           Carbon dioxide, total [Moles/volume] in Serum or Plasma 36 meq/L   21

-32                            

[facility] ([facility])    

 

           Calcium [Mass/volume] in Serum or Plasma 10.0 mg/dL 8.5-10.1         

                [facility] 

([facility])           

 

                                        Glomerular filtration rate/1.73 sq M pre

dicted among blacks [Volume Rate/Area] 

in Serum or Plasma by Creatinine-based formula (MDRD) 48 mL/min                 

                          [facility] 

([facility])           

 

                                        <content>CHRONIC KIDNEY DISEASE STAGING 

PER 

NKF</content><br/><content></content><br/><content>STAGE I & II      GFR >= 60  
     NORMAL TO MILDLY DECREASED</content><br/><content>STAGE III          GFR 
30-59          MODERATELY DECREASED</content><br/><content>STAGE IV           
GFR 15-29         SEVERELY DECREASED</content><br/><content>STAGE V            
GFR <15            VERY LITTLE GFR LEFT</content><br/><content>ESRD             
   GFR <15            ON 
RRT</content><br/><content></content><br/><content></content> 

 

                                        Glomerular filtration rate/1.73 sq M pre

dicted among non-blacks [Volume 

Rate/Area] in Serum or Plasma by Creatinine-based formula (MDRD) 39 mL/min      

                                  

                          [facility] ([facility])  

 

           Urine Color Straw                 Abnormal (applies to non-numeric re

sults)            [facility] 

([facility])           

 

           Urine Appearance SL. Hazy              Abnormal (applies to non-numer

ic results)            [facility] 

([facility])           

 

          Urine Leukocytes Negative                                [facility] ([facility]

[facility])  

 

           Specific gravity of Urine 1.015      1.005-1.030                     

  [facility] ([facility]                                  

 

          pH of Urine 6.0 units 5.0-9.0                       [facility] ([facility])  

 

           Urine Blood Trace                 Abnormal (applies to non-numeric re

sults)            [facility] 

([facility])           

 

           Glucose [Presence] in Urine Negative mg/dL                           

       [facility] ([facility]                                  

 

          Urine Protein Negative  0-0                           [facility] ([facility])  

 

          Urine Nitrite Negative                                [facility] ([facility])  

 

          Urine Ketone Negative mg/dL                               [facility] ([facility])  

 

          Urine Urobilinogen 0.2 mg/dL 0.2-1.0                       [facility] ([facility]

[facility])  

 

           Bacteria identified in Urine by Culture Full Report In L <See Note>  

                                [facility]

 ([facility])          

 

                                        [facility]

).

NO GROWTH





 

 

           Bilirubin.total [Mass/volume] in Serum or Plasma Negative            

                        [facility] 

([facility])           







                                        Procedure

 

                                          



                                                                                
                                                                                
                                                                                
                                                                                
                                                                                
                                                                                
        



Social History

          



           Code       Duration   Value      Status     Description Data Source(s

)

 

             Smoking      2020 12:00:00 AM EST Patient is a former smoker 

completed    Patient 

is a former smoker                      [facility] ([facility])



                                                                                
                  



Vital Signs

          



                    ID                  Date                Data Source

 

                    UNK                                      









           Name       Value      Range      Interpretation Code Description Data

 Source(s)

 

           Diastolic blood pressure--sitting 58 mm[Hg]                        58

 mm[Hg]  [facility]

 

                                        large cuff, Ra 

 

           Systolic blood pressure--sitting 118 mm[Hg]                       118

 mm[Hg] [facility] ([facility]

 

                                        large cuff, Ra 

 

           Heart rate 53 /min                          53 /min    [facility] ([facility]

[facility]

 

           Body mass index (BMI) [Ratio] 24.9 kg/m2                       24.9 k

g/m2 [facility] ([facility]

 

           Body height 66 [in_i]                        66 [in_i]  [facility] ([facility]

[facility])

 

                                        5'6" 

 

           Body weight 154.00 [lb_av]                       154.00 [lb_av] [facility]

T ([facility])

 

           Oxygen saturation in Arterial blood by Pulse oximetry 94 %           

                  94 %       [facility] 

([facility])

 

           Body weight 163.00 [lb_av]                       163.00 [lb_av] [facility]

T ([facility])

 

           Heart rate 82 /min                          82 /min    [facility] ([facility])

 

           Body mass index (BMI) [Ratio] 24.1 kg/m2                       24.1 k

g/m2 [facility] ([facility]

 

           Oxygen saturation in Arterial blood by Pulse oximetry 95 %           

                  95 %       [facility] 

([facility])

 

           Body weight 147.00 [lb_av]                       147.00 [lb_av] [facility]

T ([facility])

 

           Body height 65.5 [in_i]                       65.5 [in_i] [facility] ([facility]

[facility])

 

                                        5'5.50" 

 

           Respiratory rate 14 /min                          14 /min    [facility] (

[facility])

 

           Body temperature 98.4 [degF]                       98.4 [degF] [facility]

 ([facility])

 

           Heart rate 56 /min                          56 /min    [facility] ([facility])

 

           Diastolic blood pressure 50 mm[Hg]                        50 mm[Hg]  

[facility] ([facility])

 

           Systolic blood pressure 130 mm[Hg]                       130 mm[Hg] [facility] ([facility])

 

           Body weight 148.00 [lb_av]                       148.00 [lb_av] [facility]

T ([facility])

 

           Body mass index (BMI) [Ratio] 24.8 kg/m2                       24.8 k

g/m2 [facility] ([facility]

 

           Oxygen saturation in Arterial blood by Pulse oximetry 97 %           

                  97 %       [facility] 

([facility])

 

           Body weight 151.50 [lb_av]                       151.50 [lb_av] [facility]

T ([facility])

 

           Body height 65.50 [in_i]                       65.50 [in_i] [facility] ([facility]

[facility])

 

                                        5'5.50" 

 

           Heart rate 42 /min                          42 /min    [facility] ([facility])

 

           Diastolic blood pressure 70 mm[Hg]                        70 mm[Hg]  

[facility] ([facility])

 

           Systolic blood pressure 130 mm[Hg]                       130 mm[Hg] [facility] ([facility])

 

           Body mass index (BMI) [Ratio] 25.8 kg/m2                       25.8 k

g/m2 [facility] ([facility]

 

           Oxygen saturation in Arterial blood by Pulse oximetry 95 %           

                  95 %       [facility] 

([facility])

 

                                        [facility] 

 

           Body weight 157.50 [lb_av]                       157.50 [lb_av] [facility]

T ([facility])

 

           Body height 65.50 [in_i]                       65.50 [in_i] [facility] ([facility]

[facility])

 

                                        5'5.50" 

 

           Heart rate 57 /min                          57 /min    [facility] ([facility]

[facility])

 

           Diastolic blood pressure 70 mm[Hg]                        70 mm[Hg]  

[facility] ([facility])

 

           Systolic blood pressure 132 mm[Hg]                       132 mm[Hg] [facility] ([facility])

 

           Diastolic blood pressure--sitting 68 mm[Hg]                        68

 mm[Hg]  [facility] ([facility]

 

           Systolic blood pressure--sitting 122 mm[Hg]                       122

 mm[Hg] [facility] ([facility]

 

           Heart rate 51 /min                          51 /min    [facility] ([facility]

[facility])

 

           Body mass index (BMI) [Ratio] 26.0 kg/m2                       26.0 k

g/m2 [facility] ([facility]

 

           Body height 66 [in_i]                        66 [in_i]  [facility] ([facility]

[facility])

 

                                        5'6" 

 

           Body weight 161.00 [lb_av]                       161.00 [lb_av] [facility]

T ([facility])

 

           Body weight 72.633 kg                        72.633 kg  [facility] ([facility])

 

           Body mass index (BMI) [Ratio] 27.5 kg/m2                       27.5 k

g/m2 [facility] ([facility])

 

           Body weight 160.12 [lb_av]                       160.12 [lb_av] [facility]

T ([facility])

 

           Body height 64 [in_i]                        64 [in_i]  [facility] ([facility])

 

                                        5'4" 

 

           Oxygen saturation in Arterial blood by Pulse oximetry 97 %           

                  97 %       [facility] 

([facility])

 

           Heart rate 53 /min                          53 /min    [facility] ([facility])

 

           Diastolic blood pressure 68 mm[Hg]                        68 mm[Hg]  

[facility] ([facility])

 

           Systolic blood pressure 126 mm[Hg]                       126 mm[Hg] M

[facility] ([facility])

 

           Body mass index (BMI) [Ratio] 26.4 kg/m2                       26.4 k

g/m2 [facility] ([facility])

 

           Oxygen saturation in Arterial blood by Pulse oximetry 95 %           

                  95 %       [facility] 

([facility])

 

           Body weight 161.00 [lb_av]                       161.00 [lb_av] [facility]

T ([facility])

 

           Body height 65.50 [in_i]                       65.50 [in_i] [facility] ([facility]

 

                                        5'5.50" 

 

           Heart rate 60 /min                          60 /min    [facility] ([facility])

 

           Diastolic blood pressure 50 mm[Hg]                        50 mm[Hg]  

[facility] ([facility])

 

           Systolic blood pressure 128 mm[Hg]                       128 mm[Hg] M

[facility] ([facility])



                                                                                
                  



Patient Treatment Plan of Care

          



             Planned Activity Planned Date Details      Description  Data Source

(s)

 

             Klor-Con M10 10 MEQ 2020 12:00:00 AM EST                     

      [facility] ([facility])

 

             Ipratropium-Albuterol 0.5-2.5 (3) MG/3ML 2020 12:00:00 AM EST

                           [facility]

 ([facility])

 

             Biaxin 500 MG 2020 12:00:00 AM EST                           

[facility] ([facility])

 

             Xopenex 0.63 MG/3ML 2020 12:00:00 AM EST                     

      [facility] ([facility])

 

             Spironolactone 25 MG 2020 12:00:00 AM EST                    

       [facility] ([facility])

 

             Multivitamin 2020 12:00:00 AM EST                           N

[facility] ([facility])

 

             Simvastatin 40 MG 2020 12:00:00 AM EST                       

    [facility] ([facility])

 

             Coumadin 3 MG 2020 12:00:00 AM EST                           

[facility] ([facility])

 

             Albuterol Sulfate (2.5 MG/3ML) 0.083% 2020 12:00:00 AM EST   

                        [facility] 

([facility])

 

             Tylenol      2020 12:00:00 AM EST                           N

[facility] ([facility])

 

             Torsemide 100 MG 2020 12:00:00 AM EST                        

   [facility] ([facility])

 

             Calcium      2020 12:00:00 AM EST                           N

[facility] ([facility])

 

             Aricept 5 MG 2020 12:00:00 AM EST                           N

[facility] ([facility])

 

             Levothyroxine 2020 12:00:00 AM EST                           

[facility] ([facility])

 

             Incruse Ellipta 62.5 MCG/INH 2020 12:00:00 AM EST            

               [facility] ([facility])

 

             Vitamin D    2020 12:00:00 AM EST                           N

[facility] ([facility])

 

             Advair Diskus 100-50 MCG/DOSE 2020 12:00:00 AM EST           

                [facility] ([facility])

 

             Colace       2020 12:00:00 AM EST                           N

[facility] ([facility])

## 2021-01-22 NOTE — REPVR
PROCEDURE INFORMATION: 

Exam: CT Maxillofacial Without Contrast 

Exam date and time: 1/22/2021 2:34 AM 

Age: 85 years old 

Clinical indication: Face pain; Additional info: Trauma 



TECHNIQUE: 

Imaging protocol: Computed tomography images of the face without contrast. 

Radiation optimization: All CT scans at this facility use at least one of these 

dose optimization techniques: automated exposure control; mA and/or kV 

adjustment per patient size (includes targeted exams where dose is matched to 

clinical indication); or iterative reconstruction. 



COMPARISON: 

No relevant prior studies available. 



FINDINGS: 

Orbital cavity: Orbits are normal. Globes are unremarkable. 

Bones/joints: There are degenerative changes in the upper cervical spine. There 

are degenerative changes of the TMJs bilaterally. No acute fracture. 

Paranasal sinuses: Normal. No air-fluid levels. 

Soft tissues: Unremarkable. 



IMPRESSION: 

1. Degenerative changes in the upper cervical spine and in the TMJs. 

2. Otherwise negative CT facial bones. No acute fracture. 



Electronically signed by: Thomas Arevalo On 01/22/2021  03:14:39 AM

## 2021-01-22 NOTE — REP
INDICATION:

hip pain



COMPARISON:

None.



TECHNIQUE:

Single AP view of the pelvis.



FINDINGS:

Age-related degenerative changes are appreciated.  No acute fracture or dislocation.

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



IMPRESSION:

Arthritic changes.  No acute fracture or dislocation.





<Electronically signed by Miguel Ángel Hedrick > 01/22/21 [id number]

## 2021-01-22 NOTE — CCD
Continuity of Care Document

                             Created on: 2020



Xena Basilio

External Reference #: [id number]

: 1935

Sex: Female



Demographics





                          Address                   [address] [place] [address]

 

                          Preferred Language        English

 

                          Marital Status            Unknown

 

                          Synagogue Affiliation     Unknown

 

                          Race                      Unknown

 

                          Ethnic Group              Unknown





Author





                          Author                    [employer]

d

 

                          Organization              Unknown

 

                          Address                   Unknown

 

                          Phone                     Unavailable







Care Team Providers





                    Care Team Member Name Role                Phone

 

                    Haris Telles    Unavailable          [phone]

 

                          Unavailable               Unavailable

 

                    Haris Telles    Unavailable          [phone]

 

                          Unavailable               Unavailable

 

                    Young, Karolina       Unavailable          [phone]

 

                    Tracie Almonte Unavailable          [phone]

 

                    Elva Mendel       Unavailable          [phone]



                                                  



Problems

                



                    Name                Dates               Details

 

                                                             Acute combined syst

olic (congestive) and diastolic 

(congestive) heart failure          (I50.41)

                          29-Oct-2020               Status: Active



                                                                         



Medications

                



                    Name                Dates               Details

 

                                        Xopenex 0.63 MG/3ML

every 6 hours as needed for wheezing or shortness of breath 

Haris Telles MD 

 







Active

Biaxin 500 MG

1 tab 1 hour prior to dental procedures

Bryan Telles MD 



                                         Start : 2020





Active

Ipratropium-Albuterol 0.5-2.5 (3) MG/3ML

every 6 hours as needed for shortness of breath

Bryan Telles MD 



                                         Start : 2020





Active

Klor-Con M10 10 MEQ



Bryan Telles MD 



                                         Start : 2020





Active

Spironolactone 25 MG



Bryan Telles MD 



                                         Start : 2020





Active

Multivitamin 



Bryan Telles MD 



                                         Start : 2020





Active

Simvastatin 40 MG



Bryan Telles MD 



                                         Start : 2020





Active

Coumadin 3 MG



Bryan Telles MD 



                                         Start : 2020





Active

Albuterol Sulfate (2.5 MG/3ML) 0.083%

via nebulizer for SOB, no more than 4x/day

Bryan Telles MD 



                                         Start : 2020





Active

Tylenol 

2 by mouth everyday as needed for pain >5, no more than 3,000 mg/day.

Bryan Telles MD 



                                         Start : 2020





Active

Torsemide 100 MG



Bryan Telles MD 



                                         Start : 2020





Active

Calcium 



Bryan Telles MD 



                                         Start : 2020





Active

Colace 



Bryan Telles MD 



                                         Start : 2020





Active

Advair Diskus 100-50 MCG/DOSE

one puff by dulce maria twice a day

Bryan Telles MD 



                                         Start : 2020





Active

Vitamin D 



Bryan Telles MD 



                                         Start : 2020





Active

Incruse Ellipta 62.5 MCG/INH



Bryan Telles MD 



                                         Start : 2020





Active

Levothyroxine 



Bryan Telles MD 



                                         Start : 2020





Active

Aricept 5 MG



Bryan Telles MD 



                                         Start : 2020





Active

Spironolactone 25 MG



Bryan Telles MD 



                           Start : 2018       End : 2018





Inactive

Aricept 5 MG



Bryan Telles MD 



                           Start : 2018       End : 2018





Inactive

Synthroid 25 MCG



Bryan Telles MD 



                           Start : 2018       End : 2018





Inactive

Multivitamin Adults 



Bryan Telles MD 



                           Start : 2018       End : 2018





Inactive

Potassium Chloride Makenzie ER 20 MEQ

2 tablets twice a day

Bryan Telles MD 



                           Start : 2018       End : 2018





Inactive

Vitamin D3 5000 UNIT



Bryan Telles MD 



                           Start : 2018       End : 2018





Inactive

Colace 100 MG

daily as needed for constipation

Bryan Telles MD 



                           Start : 2018       End : 2018





Inactive

predniSONE 10 MG

3 tablets for 3 days, 2 tablets for 3 days, 1 tablet for 3 days then stop.

Bryan Telles MD 



                           Start : 2018       End : 2018





Inactive

Mucinex 600 MG



Bryan Telles MD 



                           Start : 2018       End : 2018





Inactive

Coumadin 3 MG



Bryan Telles MD 



                           Start : 2018       End : 2018





Inactive

Albuterol Sulfate  (90 Base) MCG/ACT

2 puffs four times a day as needed for SOB

Bryan Telles MD 



                           Start : 2018       End : 2018





Inactive

Albuterol Sulfate (2.5 MG/3ML) 0.083%



Bryan Telles MD 



                           Start : 2018       End : 2018





Inactive

Calcium 500+D 500-200 MG-UNIT



Bryan Telles MD 



                           Start : 2018       End : 2018





Inactive

Advair Diskus 100-50 MCG/DOSE



Bryan Telles MD 



                           Start : 2018       End : 2018





Inactive

Simvastatin 40 MG



Bryan Telles MD 



                           Start : 2018       End : 2018





Inactive

Torsemide 100 MG

0800 and noon 

Bryan Telels MD 



                           Start : 2018       End : 2018





Inactive

Tylenol 325 MG

2 tablets Q6hrs as needed for pain. Max daily dose: 3000mg 

Bryan Telles MD 



                           Start : 2018       End : 2018





Inactive

                                                                                
                                                                                
                                                                                
                                                                                
                                                                                
        



Allergies and Adverse Reactions

          



                    Name                Dates               Details

 

                                         chlorpromazine (Allergy) Onset:    Status:  

Active 



 

                                         codeine (Allergy) Onset:  2020  

Status:  Active 



 

                                         Penicillin (Allergy) Onset:  

0  Status:  Active 





                                                                                
       



Results

                



                Date            Description     Value           Details

 

                                                                                

            

                                                  No Known Results              

 

                                                                

 

                                                                         



                                                                         



Plan of Care

                



                    Name                Dates               Details

 

                                        Instructions         

 

                                                  Diet:Regular Diet             

        

                                                                             Ins

truction Type: 

          Nutrition education                               

                           



                                                                         



Payers

                * Medicare - [facility]

* [facility]

## 2021-01-22 NOTE — REPVR
PROCEDURE INFORMATION: 

Exam: CT Head Without Contrast 

Exam date and time: 1/22/2021 2:34 AM 

Age: 85 years old 

Clinical indication: Injury or trauma; Fall; Concussion/head injury 



TECHNIQUE: 

Imaging protocol: Computed tomography of the head without contrast. 

Radiation optimization: All CT scans at this facility use at least one of these 

dose optimization techniques: automated exposure control; mA and/or kV 

adjustment per patient size (includes targeted exams where dose is matched to 

clinical indication); or iterative reconstruction. 



COMPARISON: 

CT Head without contrast 10/20/2020 10:09 AM 



FINDINGS: 

Brain: There is minimal patchy low attenuation of deep white matter. There is 

mild prominence of the peripheral sulci. There are bilateral basal ganglia 

calcifications. 

Cerebral ventricles: There is mild prominence of the central ventricular 

system. 

Bones/joints: Unremarkable. No acute fracture. 

Paranasal sinuses: Visualized sinuses are unremarkable. No fluid levels. 

Mastoid air cells: Visualized mastoid air cells are well aerated. 

Soft tissues: Unremarkable. 



IMPRESSION: 

There has been little change from 10/20/2020 with minimal chronic ischemic 

white matter change and mild atrophy. No acute interval intracranial process is 

identified. 



Electronically signed by: Thomas Arevalo On 01/22/2021  03:12:23 AM

## 2021-01-22 NOTE — CCD
Continuity of Care Document (CCD)

                             Created on: 2020



Xena Basilio

External Reference #: [id number]

: 1935

Sex: Female



Demographics





                          Address                   [address]

[place]  [address]

 

                          Home Phone                [phone]

 

                          Preferred Language        Unknown

 

                          Marital Status            

 

                          Mandaen Affiliation     Unknown

 

                          Race                      White

 

                          Ethnic Group              Not  or 





Author





                          Author                    Xena Bajwa

 

                          Organization              Unknown

 

                          Address                   [address] [facility] [address]

[place]  [address]



 

                          Phone                     [phone]







Care Team Providers





                    Care Team Member Name Role                Phone

 

                    Haris Telles JR, MD AUTM                Unavailable

 

                    Tiny Arnett  AUTM                [phone]

 

                    Kandy Rosas MD    AUTM                [phone]

 

                    Ronna Romero MD AUTM                [phone]

 

                    Екатерина Ward   AUTM                [phone]

 

                    [place] [facility]  AUTM                [phone]







Problems





                    Active Problems     Provider            Date

 

                    Anticoagulants Long Term (Current) Use                     O

nset: 1997

 

                    Atrial fibrillation                     Onset: 1997

 

                    Contact dermatitis  CLARISSA Bajwa    Onset: 2011

 

                    Heart valve replacement CLARISSA Bajwa    Onset: 

3

 

                    Mitral valve disorder CLARISSA Bajwa    Onset: 2013

 

                    Essential hypertension JOON Robin Onset: 

3

 

                    Anticoagulant agent CLARISSA Bajwa    Onset: 07/15/2015

 

                    Chronic atrial fibrillation Haris Tellse MD Onset: 

 

                    Hypothyroidism      Haris Telles MD Onset: 10/04/2017







Social History





                Type            Date            Description     Comments

 

                Birth Sex                       Unknown          

 

                Tobacco Use     Start: Unknown End: Unknown Patient is a former 

smoker  

 

                Exercise Type/Frequency                 Exercises rarely  







Allergies, Adverse Reactions, Alerts





             Active Allergies Reaction     Severity     Comments     Date

 

             Codeine,PCN                                         2010

 

             Thorazine                                           2016







Medications





           Active Medications SIG        Qnty       Indications Ordering Provide

r Date

 

                          Xopenex                     0.63mg/3ML Nebulizer      

             use four 

times daily prn for wheezing and shortness of breath. DX J44.9 36ml             

                       Екатерина Nguyen [facility]                               10/26/2020

 

                                        Culturelle Digestive Health Probiotic   

                   Capsules             

                one twice daily while on antibiotics or if having diarrhea 30cap

s                          Екатерина Nguyen [facility]                               10/23/2020

 

                                        Vitamin D3 Ultra Strength               

      125mcg (5000 Ut) Capsules         

             1 by mouth every day 90caps                    Екатерина Nguyen [facility] 

 

                          Nebulizer Kit/Tubing/Mouthpiece                      K

it                   for 

use with nebulizer--use up to four times a day dx j44.9 1units                  

                Екатерина Nguyen 

[facility]                                     2019

 

                          Incruse Ellipta                     62.5mcg/Inh Aeroso

l                   inhale

one puff by mouth daily 3units                          Екатерина Nguyen [facility] 01/15/2

019

 

                          Levothyroxine Sodium                     25mcg Tablets

                   1 

tablet by mouth every day 90tabs                          Екатерина Nguyen [facility] 10/04

/2017

 

                          Aricept                     5mg Tablets               

    1 by mouth every day 

at bedtime      90tabs          G30.1           Sravan Franks M.D. 10/03/2017

 

                          Advair Diskus                     100-50mcg/Dose Aeros

ol                   

inhale one puff by mouth twice a day 180units                        Екатерина Nguyen [facility] 2017

 

                          Colace                     100mg Capsules             

      1 by mouth twice a 

day             180caps         K59.00          Екатерина Nguyen [facility] 2017

 

                          Calcium 500+D                     500-200mg-Unit Table

ts                   1 by 

mouth three times a day 270tabs                         Haris Telles MD 

 

                          Duoneb                     0.5-2.5(3)mg/3ML Solution  

                 inhale 

the contents of one vial via nebulizer four times a day as needed for wheezing 
or shortness of breath 270ml           R06.02          Екатерина Nguyen [facility] 05/15/20

17

 

                          Torsemide                     100mg Tablets           

        1tablet by mouth 

daily           30tabs          R60.0           Екатерина Nguyen [facility] 05/15/2017

 

                          Tylenol                     325mg Capsules            

       2 by mouth every 

day as needed   180caps                         Екатерина Nguyen [facility] 2017

 

                                        Albuterol Sulfate                     (2

.5mg/3ML) 0.083% Nebulizer              

             q.i.d. prn via nebulizer dx J44.9 75ml                      Екатерина Nguyen [facility] 2016

 

                          Coumadin                     3mg Tablets              

     take 1 tablet by 

mouth once a day as directed 90tabs                          Екатерина Nguyen, [facility] 

 

                          Simvastatin                     40mg Tablets          

         take one tablet 

by mouth at bedtime 90tabs                          Екатерина Nguyen [facility] 2015

 

                    Multi-Vitamins                      Tablets                 

  1 by mouth daily    

90tabs                                  Екатерина Nguyen, [facility]    2003

 

                          Spironolactone                     25mg Tablets       

            1 by mouth 

every day                                       Unknown         

 

                          Klor-Con M10                     10Meq Tablets ER     

              2 by mouth 

every day                                       Unknown         

 

                          Losartan Potassium                     25mg Tablets   

                1 by mouth

every day                                       Unknown         

 

                          Ciprofloxacin HCL                     500mg Tablets   

                1 tab by 

mouth twice daily                                 Unknown         

 

                          Metronidazole                     500mg Tablets       

            one by mouth 

three times a day for 10 days                                 Unknown         







Medications Administered in Office





           Medication SIG        Qnty       Indications Ordering Provider Date

 

                          Administration Of Flu Vaccine                      Inj

ection                    

                                                Екатерина Nguyen, [facility] 2019

 

                                        Immunization Adminstration,1 Vaccine/Tox

oid                      Injection      

                                                    [facility]

 

                          Administration Of Flu Vaccine                      Inj

ection                    

                                                Екатерина Nguyen, [facility] 10/19/2018

 

                          Administration Of Flu Vaccine                      Inj

ection                    

                                                Kelsea Adams, [facility] 10/03/2017

 

                          Administration Of Flu Vaccine                      Inj

ection                    

                                                Kelsea Adams, [facility] 10/13/2016

 

                          Administration Of Flu Vaccine                      Inj

ection                    

                                                Haris Telles MD 10/15/2015

 

                          Administration Of Flu Vaccine                      Inj

nonaion                    

                                                Haris Telles MD 10/23/2014

 

                          Administration Of Flu Vaccine                      Inj

bruce Telles MD 10/12/2012

 

                          Administration Of Flu Vaccine                      Inj

nonaion                    

                                                Haris Telles MD 10/11/2011

 

                          Administration Of Flu Vaccine                      Inj

nonaion                    

                                                Haris Telles MD 10/07/2010

 

                          Administration Of Flu Vaccine                      Inj

nonaion                    

                                                Haris Telles MD 10/08/2009

 

                          Administration Of Flu Vaccine                      Inj

ection                    

                                                Haris Telles MD 2008

 

                          Administration Of Flu Vaccine                      Inj

ection                    

                                                Haris Telles MD 10/18/2007

 

                Administration Of Zostavax                      Injection       

                                            

                          Haris Telles MD     2007

 

                          Administration Of Flu Vaccine                      Inj

ection                    

                                                JOON Robin 2006

 

                          Administration Of Flu Vaccine                      Inj

ection                    

                                                Haris Telles MD 10/04/2005

 

                          Administration Of Flu Vaccine                      Inj

ection                    

                                                CLARISSA Bajwa 10/19/2004

 

                          Administration Of Flu Vaccine                      Inj

ection                    

                                                Haris Telles MD 2003

 

                          Administration Of Flu Vaccine                      Inj

ection                    

                                                Екатерина [place], FNP 10/08/2002







Immunizations





             CPT Code     Status       Date         Vaccine      Lot #

 

             U-Flu        Given        10/05/2020   Influenza,Unspecified  

 

             [id number]        Given        2020   PPD          [id number]

 

                00571           Given           2019      Influenza Vaccin

e Quadrivalent Preser/Antibiotic Free Im 

Use                                     [id number]

 

             62745        Given        2019   Adacel- Tetanus Diphtheria P

ertussis (Age63 & Under) 

[id number]

 

             20460        Given        2019   Shingrix      

 

             86314        Given        2018   Shingrix      

 

                05143           Given           10/19/2018      Influenza Virus 

Vaccine, Quadrivalent (Cciiv4), Derived 

From Cell                               [id number]

 

                47834           Given           10/03/2017      Influenza Vaccin

e Quadrivalent Preser/Antibiotic Free Im 

Use                                     [id number]

 

                     Given        10/13/2016   Fluvirin Virus Vaccine 61155

01

 

                     Given        10/15/2015   Fluvirin Virus Vaccine 28135

01

 

             08327        Given        2015   Prevnar 13   [id number]

 

                     Given        10/23/2014   Fluvirin Virus Vaccine 94134

21

 

                     Given        10/12/2012   Fluvirin Virus Vaccine 79039

01

 

                     Given        10/11/2011   Fluvirin Virus Vaccine  

 

                     Given        10/11/2011   Fluvirin Virus Vaccine  

 

             68710        Given        10/07/2010   Influenza Virus Vaccine  

 

             97485        Given        2009   Pneumovax 23  

 

             56724        Given        10/08/2009   Influenza Virus Vaccine  

 

             94010        Given        2008   Influenza Virus Vaccine  

 

             83435        Given        10/18/2007   Influenza Virus Vaccine  

 

             89903        Given        2007   Zoster Vaccine  

 

             88726        Given        2006   Influenza Virus Vaccine  

 

             03118        Given        10/04/2005   Influenza Virus Vaccine  

 

             32625        Given        10/19/2004   Influenza Virus Vaccine  

 

             30720        Given        2003   Influenza Virus Vaccine  

 

             58637        Given        10/08/2002   Influenza Virus Vaccine  

 

             95297        Given        2001   Influenza Virus Vaccine  

 

             79896        Given        2001   Tetanus Toxoid  

 

             U-Pneum      Given        10/01/2000   Pneumococcal,Unspecified  

 

             34087        Given        10/21/1999   Influenza Virus Vaccine  

 

             03973        Given        10/25/1994   Influenza Virus Vaccine  







Vital Signs





                Date            Vital           Result          Comment

 

                10/29/2020 11:21am Heart Rate      82 /min          

 

                    Weight              163.00 lb            

 

                    O2 % BldC Oximetry  94 %                 

 

                09/10/2020  1:40pm BP Systolic     130 mmHg         

 

                    BP Diastolic        50 mmHg              

 

                    Heart Rate          56 /min              

 

                    Body Temperature    98.4 F             

 

                    Respiratory Rate    14 /min              

 

                    Height              65.5 inches         5'5.50"

 

                    Weight              147.00 lb            

 

                    O2 % BldC Oximetry  95 %                 

 

                    BMI (Body Mass Index) 24.1 kg/m2           







Results





        Test    Acquired Date Facility Test    Result  H/L     Range   Note

 

                    Complete Blood Count 2020          [facility]

enter

                                        [address] [place] [address] [phone] White Blood Count 7.8 10     Normal     4.0-10.0    

 

             Red Blood Count 4.46 10      Normal       4.00-5.40     

 

             Hemoglobin   13.3 g/dL    Normal       12.0-15.5     

 

             Hematocrit   41.6 %       Normal       36.0-47.0     

 

             Mean Corpuscular Volume 93.3 fl      Normal       80.0-96.0     

 

             Mean Corpuscular Hemoglobin 29.8 pg      Normal       27.0-33.0    

 

 

             Mean Corpuscular HGB Conc 32.0 g/dL    Normal       32.0-36.5     

 

             Red Cell Distribution Width 15.9 %       High         11.5-14.5    

 

 

             Platelet Count, Automated 231 10       Normal       150-450       

 

             Nucleated Red Blood Cell % 0.0 %        Normal       0-0           

 

                    Comprehensive Metabolic Profil 2020          [facility] [address] [place] [address] [phone] Glucose, Fasting 99 mg/dL   Normal           

 

             Blood Urea Nitrogen 21 mg/dL     High         7-18          

 

             Creatinine For GFR 1.07 mg/dL   Normal       0.55-1.30     

 

             Glomerular Filtration Rate 52.1         Normal       >32          1

 

             Sodium Level 136 mEq/L    Normal       136-145       

 

             Potassium Serum 4.2 mEq/L    Normal       3.5-5.1       

 

             Chloride Level 95 mEq/L     Low                  

 

             Carbon Dioxide Level 35 mEq/L     High         21-32         

 

             Anion Gap    6 mEq/L      Low          8-16          

 

             Calcium Level 10.1 mg/dL   Normal       8.8-10.2      

 

             Ast/Sgot     32 U/L       Normal       7-37          

 

             Alt/SGPT     23 U/L       Normal       12-78         

 

             Alkaline Phosphatase 66 U/L       Normal               

 

             Bilirubin,Total 0.7 mg/dL    Normal       0.2-1.0       

 

             Total Protein 6.6 GM/DL    Normal       6.4-8.2       

 

             Albumin      3.7 GM/DL    Normal       3.2-5.2       

 

             Albumin/Globulin Ratio 1.3          Normal       1.2-2.2       

 

                    Type & Screen -Incl Blood Type,Walker,AB SC 10/20/2020         

 [address] [address] [phone] Blood Type A POSITIVE  Normal                 

 

             AB Screen (Indirect Anatoly)Vis NEGATIVE     Normal                 

    

 

                    Laboratory test finding 10/20/2020          [address] [address] [phone] Lipase     157 U/L    Normal           

 

             Thyroid Stimulating Hormone 3.480 uIU/ML Normal       0.358-3.740  

 

 

                    Liver Profile       10/20/2020          [facility]

nter

                                        [address] [place] [address] [phone] Ast/Sgot   23 U/L     Normal     7-37        

 

             Alt/SGPT     24 U/L       Normal       12-78         

 

             Alkaline Phosphatase 72 U/L       Normal               

 

             Bilirubin,Total 1.2 mg/dL    High         0.2-1.0       

 

             Bilirubin,Direct 0.4 mg/dL    High         0.0-0.2       

 

             Total Protein 6.7 GM/DL    Normal       6.4-8.2       

 

             Albumin      3.7 GM/DL    Normal       3.2-5.2       

 

             Albumin/Globulin Ratio 1.2          Normal       1.2-2.2       

 

                    Cardiac Marker Panel 10/20/2020          [facility]

enter

                                        [address] [place] [address] [phone] CPK Creatine Phosphokinase 62 U/L     Normal     26-19

2      

 

             CK-MB Value Mass < 1.0 NG/ML  Normal       <3.6          

 

             MB/CK Relative Index 1.61         Normal       < Or =4      2

 

             Troponin I   < 0.02 NG/ML Normal       < 0.10       3

 

                    Ua W/ Reflex To Culture 10/20/2020          [address] [address] [phone] Appearance, Urine RFX CLEAR      Normal     Clear     

  

 

             Color, Urine RFX YELLOW       Normal       Yellow        

 

             PH,Urine RFX 6.0 units    Normal       5.0-9.0       

 

             Specific Gravity Ur Auto RFX 1.012        Normal       1.002-1.035 

  

 

             Protein, Urine Auto RFX NEGATIVE mg/dL Normal       Negative      

 

             Glucose, Urine (Ua) Auto RFX NEGATIVE mg/dL Normal       Negative  

    

 

             Ketone, Urine Auto RFX NEGATIVE mg/dL Normal       Negative      

 

             Urobilinogen, Urine Auto RFX 0.2 mg/dL    Normal       0.0-2.0     

  

 

             Bilirubin, Urine Auto RFX NEGATIVE     Normal       Negative      

 

             Nitrite, Urine Auto RFX NEGATIVE     Normal       Negative      

 

             Leukocyte Esterase Ur Auto RFX NEGATIVE     Normal       Negative  

    

 

             Blood, Urine Blood RFX NEGATIVE     Normal       Negative      

 

             WBC, Urine Auto RFX 1 /HPF       Normal       0-3           

 

             RBC, Urine Auto RFX 1 /HPF       Normal       0-3           

 

             Bacteria, Urine Auto RFX NEGATIVE     Normal       Negative      

 

             Squam Epithelial Cell Ur Aurfx 0 /HPF       Normal       0-6       

    

 

             Hyaline Cast, Urine Auto RFX 0 /LPF       Normal       0-1         

  

 

                    Laboratory test finding 10/20/2020          [address] [address] [phone] Ammonia    < 10 uMOL/L Normal     <32         

 

                    CBC With Differential 10/20/2020          [address] [address] [phone] White Blood Count 14.2 10    High       4.0-10.0    

 

             Red Blood Count 4.35 10      Normal       4.00-5.40     

 

             Hemoglobin   13.1 g/dL    Normal       12.0-15.5     

 

             Hematocrit   40.2 %       Normal       36.0-47.0     

 

             Mean Corpuscular Volume 92.4 fl      Normal       80.0-96.0     

 

             Mean Corpuscular Hemoglobin 30.1 pg      Normal       27.0-33.0    

 

 

             Mean Corpuscular HGB Conc 32.6 g/dL    Normal       32.0-36.5     

 

             Red Cell Distribution Width 15.3 %       High         11.5-14.5    

 

 

             Platelet Count, Automated 178 10       Normal       150-450       

 

             Neutrophils % 75.0 %       High         36.0-66.0     

 

             Lymph %      5.6 %        Low          24.0-44.0     

 

             Mono %       17.9 %       High         0.0-5.0       

 

             Eos %        0.5 %        Normal       0.0-3.0       

 

             Baso %       0.4 %        Normal       0.0-1.0       

 

             Immature Granulocyte % 0.6 %        Normal       0-3.0         

 

             Nucleated Red Blood Cell % 0.0 %        Normal       0-0           

 

             Neutrophils # 10.7 10      High         1.5-8.5       

 

             Lymph #      0.8 10       Low          1.5-5.0       

 

             Mono #       2.5 10       High         0.0-0.8       

 

             Eos #        0.1 10       Normal       0.0-0.5       

 

             Baso #       0.1 10       Normal       0.0-0.2       

 

                    Istat PT/Inr        10/20/2020          [facility]

nter

                                        [address] [place] [address] [phone] iSTAT Protime Seconds 31.3 seconds High       12.1-14.

4   

 

             iSTAT Inr    2.7          Normal                     

 

                    Istat Chem8+ Panel  10/20/2020          [facility]

nter

                                        [address] [place] [address] [phone] iSTAT HCT  41.0 %     Normal     38.0-51.0   

 

             iSTAT Glucose 107 mg/dL    High                 

 

             iSTAT Sodium 136 mEq/L    Normal       136-145       

 

             iSTAT Potassium 3.9 mEq/L    Normal       3.5-5.1       

 

             iSTAT CA++   5.1 mg/dL    Normal       4.5-5.3       

 

             iSTAT Chloride 95 mEq/L     Low                  

 

             iSTAT Co2    30.0 MM/L    High         23.0-27.0     

 

             iSTAT BUN    30 mg/dL     High         8-26          

 

             iSTAT Creatinine 1.2 mg/dL    Normal       0.6-1.3       

 

                    Laboratory test finding 10/20/2020          [address] [address] [phone] iSTAT Lactate 0.90       Normal     0.4-2.0     

 

                    Laboratory test finding 10/20/2020          [address] [address] [phone] iSTAT Troponin 0.02 NG/ML Normal     0.00-0.08   

 

                    Complete Blood Count 09/10/2020          [facility]

s pc

: Dr Haris Telles

[place] [address]

           [phone] WBC        7.4 x10*3/UL            4.1 - 10.9  

 

             RBC          5.02 x10*6/UL              4.20 - 6.30   

 

             Hemoglobin   14.8 g/dL                 12.0 - 18.0   

 

             Hematocrit   44.4 %                    37.0 - 51.0   

 

             MCV          88.5 fL                   80.0 - 97.0   

 

             MCH          29.4 pg                   26.0 - 32.0   

 

             MCHC         33.3 g/dL                 31.0 - 38.0   

 

             RDW          14.3 %       High         11.6 - 13.7   

 

             PLT          208 x10*3/UL              140 - 440     

 

             MPV          8.8 FL                    7.8 - 11.0    

 

             Lymph %      16.7 %                    10.0 - 58.5   

 

             Mid %        4.6 %                     1.7 - 9.3     

 

             Neut %       78.7 %                    37.0 - 92.0   

 

             Lymph #      1.2 x10*3/UL              0.6 - 4.1     

 

             Mid #        0.4 x10*3/UL              0.1 - 0.6     

 

             Neut #       5.8 x10*3/UL              2.0 - 7.8     

 

                    Basic Metabolic Panel 09/10/2020          [facility]

[facility]

: Dr Haris Telles

[place] [address] [phone] Glucose    100 mg/dL  High       74 - 99    4

 

             BUN          24 mg/dL     High         7 - 18        

 

             Creatinine   1.4 mg/dL    High         0.6 - 1.3     

 

             Sodium       139 mEq/L                 136 - 145     

 

             Potassium    4.3 mEq/L                 3.5 - 5.1     

 

             Chloride     99 mEq/L                  98 - 107      

 

             Carbon Dioxide 36 mEq/L     High         21 - 32       

 

             Calcium      9.9 mg/dL                 8.5 - 10.1    

 

             GFR  36 mL/min    Low          >60           

 

             GFR African American 44 mL/min    Low          >60          5

 

                    Laboratory test finding 09/10/2020          [facility]

: Dr Haris Telles

[place], [place] [id number] [phone] Thyroid Stimulating Hormone 3.19 uIU/mL            0.3

6 - 3.74  

 

                    Laboratory test finding 2020          [facility]

             Inr          3.1                                     

 

                    Laboratory test finding 2020          [facility]

             Inr          2.5                                     

 

                    Complete Blood Count 06/10/2020          [facility]

s, pc

: [place] [address] [phone] WBC        8.1 x10*3/UL            4.1 - 10.9  

 

             RBC          5.07 x10*6/UL              4.20 - 6.30   

 

             Hemoglobin   14.8 g/dL                 12.0 - 18.0   

 

             Hematocrit   44.9 %                    37.0 - 51.0   

 

             MCV          88.5 fL                   80.0 - 97.0   

 

             MCH          29.3 pg                   26.0 - 32.0   

 

             MCHC         33.1 g/dL                 31.0 - 38.0   

 

             RDW          14.2 %       High         11.6 - 13.7   

 

             PLT          206 x10*3/UL              140 - 440     

 

             MPV          8.7 FL                    7.8 - 11.0    

 

             Lymph %      16.9 %                    10.0 - 58.5   

 

             Mid %        4.7 %                     1.7 - 9.3     

 

             Neut %       78.4 %                    37.0 - 92.0   

 

             Lymph #      1.3 x10*3/UL              0.6 - 4.1     

 

             Mid #        0.5 x10*3/UL              0.1 - 0.6     

 

             Neut #       6.3 x10*3/UL              2.0 - 7.8     

 

                    Basic Metabolic Panel 06/10/2020          [facility]

[facility]

: Dr Haris Telles

[place] [address] [phone] Glucose    76 mg/dL              74 - 99    6

 

             BUN          22 mg/dL     High         7 - 18        

 

             Creatinine   1.2 mg/dL                 0.6 - 1.3     

 

             Sodium       144 mEq/L                 136 - 145     

 

             Potassium    4.2 mEq/L                 3.5 - 5.1     

 

             Chloride     104 mEq/L                 98 - 107      

 

             Carbon Dioxide 33 mEq/L     High         21 - 32       

 

             Calcium      9.7 mg/dL                 8.5 - 10.1    

 

             GFR  43 mL/min    Low          >60           

 

             GFR African American 52 mL/min    Low          >60          7

 

                    Laboratory test finding 06/10/2020          [facility]

[facility]

: Dr Haris Telles

[place], [place] [id number] [phone] Magnesium  2.1 mg/dL             1.8 - 2.4   







                          1                         Units are mL/min/1.73 m2



Chronic Kidney Disease Staging per NKF:



Stage I & II   GFR >=60       Normal to Mildly Decreased

Stage III      GFR 30-59      Moderately Decreased

Stage IV       GFR 15-29      Severely Decreased

Stage V        GFR <15        Very Little GFR Left

ESRD           GFR <15 on RRT



 

                          2                         DIAGNOSIS CRITERIA

MMB ng/ml       Relative Index (RI)

NON-AMI               < or = 5               N/A

GRAY ZONE              > 5                < or = 4

AMI                    > 5                   > 4



 

                          3                         Troponin I Reference Interva

l for [employer]ens Vista LOCI:



                                        99th Percentile= 0.00-0.045 ng/ml



Risk Stratification:

<= 0.10 ng/ml   Decreased Risk for Adverse Clinical

Events.

                                        0.10-1.50 ng/ml   Increased Risk for Adv

erse Clinical

Events. Evaluation of additional

criterion and/or repeat testing in 2-6

hours is suggested to rule out myocardial

damage.

>= 1.50 ng/ml   Indicative of Myocardial Injury.



 

                          4                         100-125 mg/dL     PRE-DIABET

ES/FASTING

>126 mg/dL          DIABETES/FASTING



 

                          5                         CHRONIC KIDNEY DISEASE STAGI

NG PER NKF



STAGE I & II      GFR >= 60        NORMAL TO MILDLY DECREASED

STAGE III          GFR 30-59          MODERATELY DECREASED

STAGE IV           GFR 15-29         SEVERELY DECREASED

STAGE V            GFR <15            VERY LITTLE GFR LEFT

ESRD                 GFR <15            ON RRT



 

                          6                         100-125 mg/dL     PRE-DIABET

ES/FASTING

>126 mg/dL          DIABETES/FASTING



 

                          7                         CHRONIC KIDNEY DISEASE STAGI

NG PER NKF



STAGE I & II      GFR >= 60        NORMAL TO MILDLY DECREASED

STAGE III          GFR 30-59          MODERATELY DECREASED

STAGE IV           GFR 15-29         SEVERELY DECREASED

STAGE V            GFR <15            VERY LITTLE GFR LEFT

ESRD                 GFR <15            ON RRT









Procedures





                Date            Code            Description     Status

 

                2018      [id number]       Diabetic Foot Exam Completed

 

                2017      [id number]        Mammogram       Completed

 

                01/15/2016      [id number]        Mammogram       Completed

 

                2015      [id number]        Mammogram       Completed

 

                2014      [id number]        Mammogram       Completed

 

                2013      [id number]       Diabetic Retinal Eye Exam Comple

[facility]

 

                2013      [id number]       Bone Mineral Density Test [facility]

 

                01/10/2013      [id number]        Mammogram       Completed

 

                2012      [id number]        Mammogram       Completed

 

                06/10/2011      [id number]       Bone Mineral Density Test [facility]

 

                2011      [id number]        Mammogram       Completed

 

                2009      [id number]        Mammogram       Completed

 

                2008      [id number]       Bone Mineral Density Test [facility]

 

                10/22/2004      [id number]        Colonoscopy     Completed







Medical Devices





                                        Description

 

                                        No Information Available







Encounters





           Type       Date       Location   Provider   Dx         Diagnosis

 

             Office Visit 10/29/2020 11:20a [facility] Екатерина Lemus

ne, FNP 

K57.92                                  Dvtrcli of intest, part unsp, w/o perf o

r abscess w/o bleed

 

                          I50.41                    Acute combined systolic and 

diastolic (congestive) hrt fail

 

                          I13.0                     Hyp hrt & chr kdny dis w hrt

 fail and stg 1-4/unsp chr kdny

 

                          N18.31                    Chronic kidney disease, stag

e 3a

 

                          J44.9                     Chronic obstructive pulmonar

y disease, unspecified

 

                          I48.21                    Permanent atrial fibrillatio

n

 

                          Z95.2                     Presence of prosthetic heart

 valve

 

                          Z79.01                    Long term (current) use of a

nticoagulants

 

                          R26.89                    Other abnormalities of gait 

and mobility

 

             Office Visit 09/10/2020  1:40p [facility] Екатерина Lemus

ne, FNP I13.0

                                        Hyp hrt & chr kdny dis w hrt fail and st

g 1-4/unsp chr kdny

 

                          I50.42                    Chronic combined systolic an

d diastolic hrt fail

 

                          N18.3                     Chronic kidney disease, stag

e 3 (moderate)

 

                          J44.9                     Chronic obstructive pulmonar

y disease, unspecified

 

                          G30.1                     Alzheimer's disease with lat

e onset

 

                          F02.80                    Dementia in oth diseases [facility]hr w/o behavrl disturb

 

                          I48.21                    Permanent atrial fibrillatio

n

 

                          Z79.01                    Long term (current) use of a

nticoagulants

 

                          Z95.2                     Presence of prosthetic heart

 valve

 

                          I87.2                     Venous insufficiency (chroni

c) (peripheral)

 

             Office Visit 06/10/2020  2:00p [facility] Екатерина Menendez, FNP I13.0

                                        Hyp hrt & chr kdny dis w hrt fail and st

g 1-4/unsp chr kdny

 

                          I50.42                    Chronic combined systolic an

d diastolic hrt fail

 

                          N18.3                     Chronic kidney disease, stag

e 3 (moderate)

 

                          J44.9                     Chronic obstructive pulmonar

y disease, unspecified

 

                          G30.1                     Alzheimer's disease with lat

e onset

 

                          F02.80                    Dementia in oth diseases [facility] w/o behavrl disturb

 

                          I48.21                    Permanent atrial fibrillatio

n

 

                          Z79.01                    Long term (current) use of a

nticoagulants

 

                          Z95.2                     Presence of prosthetic heart

 valve

 

                          Z13.89                    Encounter for screening for 

other disorder







Assessments





                Date            Code            Description     Provider

 

                    10/29/2020          K57.92              Diverticulitis of in

testine, part unspecified, without 

perforation or abscess without bleeding CLARISSA Bajwa

 

                    10/29/2020          I50.41              Acute combined systo

lic (congestive) and diastolic 

(congestive) heart failure              Екатерина Nguyen, [facility]

 

                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

idney disease with heart lakshmi 

Екатерина Nguyen, [facility]

 

                10/29/2020      N18.31          Chronic kidney disease, stage 3a

 Екатерина Nguyen, [facility]

 

                10/29/2020      J44.9           Chronic obstructive pulmonary di

sease, unspecified Екатерина Nguyen,

[facility]

 

                10/29/2020      I48.21          Permanent atrial fibrillation An

n Radha Goncalves, [facility]

 

                10/29/2020      Z95.2           Presence of prosthetic heart zakia

ve Екатерина Nguyen, [facility]

 

                10/29/2020      Z79.01          Long term (current) use of antic

oagulants Екатерина Nguyen, [facility]

 

                10/29/2020      R26.89          Other abnormalities of gait and 

mobility Екатерина Nguyen, [facility]

 

                2020      J44.9           Chronic obstructive pulmonary di

sease, unspecified Haris Telles MD

 

                2020      G30.1           Alzheimer's disease with late on

set Haris Telles MD

 

                2020      I48.21          Permanent atrial fibrillation Co

amanda Telles MD

 

                2020      I10             Essential (primary) hypertension

 Haris Telles MD

 

                09/10/2020      I13.0           Hypertensive heart and chronic k

idney disease with heart lakshmi 

Екатерина Nguyen, [facility]

 

                    09/10/2020          I50.42              Chronic combined sys

tolic (congestive) and diastolic 

(congestive) heart failure              Екатерина Nguyen, [facility]

 

                09/10/2020      N18.3           Chronic kidney disease, stage 3 

(moderate) Екатерина Nguyen, [facility]

 

                09/10/2020      J44.9           Chronic obstructive pulmonary di

sease, unspecified Екатерина Nguyen,

[facility]

 

                09/10/2020      G30.1           Alzheimer's disease with late on

set Екатерина Nguyen, [facility]

 

                    09/10/2020          F02.80              Dementia in other di

seases classified elsewhere without 

behavioral disturbance                  Екатерина Nguyen, [facility]

 

                09/10/2020      I48.21          Permanent atrial fibrillation An

n Radha Goncalves, [facility]

 

                09/10/2020      Z79.01          Long term (current) use of antic

oagulants Екатерина Nguyen, [facility]

 

                09/10/2020      Z95.2           Presence of prosthetic heart zakia

ve Екатерина Nguyen, [facility]

 

                09/10/2020      I87.2           Venous insufficiency (chronic) (

peripheral) Екатерина Garcia Sacha [facility]

 

                2020      Z11.1           Encounter for screening for resp

iratory tuberculosis Haris Telles MD

 

                2020      I48.21          Permanent atrial fibrillation An

marino Nguyen [facility]

 

                2020      I48.21          Permanent atrial fibrillation Pr

otime

 

                2020      Z51.81          Encounter for therapeutic drug l

evel monitoring Екатерина Radha Sacha 

[facility]

 

                2020      Z51.81          Encounter for therapeutic drug l

evel monitoring [facility]

 

                2020      Z79.01          Long term (current) use of antic

oagulants Екатерина Radha Sacha [facility]

 

                2020      Z79.01          Long term (current) use of antic

oagulants [facility]

 

                2020      I48.21          Permanent atrial fibrillation An

marino Nguyen [facility]

 

                2020      I48.21          Permanent atrial fibrillation Pr

otime

 

                2020      Z51.81          Encounter for therapeutic drug l

evel monitoring Екатерина Nguyen 

[facility]

 

                2020      Z79.01          Long term (current) use of antic

oagulants Екатерина Nguyen [facility]

 

                2020      I10             Essential (primary) hypertension

 Haris Telles MD

 

                2020      N18.3           Chronic kidney disease, stage 3 

(moderate) Haris Telles MD

 

                2020      J44.9           Chronic obstructive pulmonary di

sease, unspecified Haris Telles MD

 

                2020      G30.1           Alzheimer's disease with late on

set Haris Telles MD

 

                2020      N18.3           Chronic kidney disease, stage 3 

(moderate) Haris Telles MD

 

                2020      J44.9           Chronic obstructive pulmonary di

sease, unspecified Haris Telles MD

 

                2020      G30.1           Alzheimer's disease with late on

set Haris Telles MD

 

                2020      I10             Essential (primary) hypertension

 Haris Telles MD

 

                06/10/2020      I13.0           Hypertensive heart and chronic k

idney disease with heart lakshmi 

Екатерина Nguyen [facility]

 

                    06/10/2020          I50.42              Chronic combined sys

tolic (congestive) and diastolic 

(congestive) heart failure              Екатерина Nguyen, [facility]

 

                06/10/2020      N18.3           Chronic kidney disease, stage 3 

(moderate) Екатерина Garcia Sacha, [facility]

 

                06/10/2020      J44.9           Chronic obstructive pulmonary di

sease, unspecified Екатерина Nguyen,

[facility]

 

                06/10/2020      G30.1           Alzheimer's disease with late on

set Екатерина Nguyen, [facility]

 

                    06/10/2020          F02.80              Dementia in other di

seases classified elsewhere without 

behavioral disturbance                  Екатерина Radha Sacha [facility]

 

                06/10/2020      I48.21          Permanent atrial fibrillation An

marino Nguyen, [facility]

 

                06/10/2020      Z79.01          Long term (current) use of antic

oagulants Екатерина Garcia Sacha, [facility]

 

                06/10/2020      Z95.2           Presence of prosthetic heart zakia

ve Екатерина Nguyen, [facility]

 

                06/10/2020      Z13.89          Encounter for screening for othe

r disorder Екатерина Radha Goncalves [facility]

 

                2020      N18.3           Chronic kidney disease, stage 3 

(moderate) Haris Telles MD

 

                2020      J44.9           Chronic obstructive pulmonary di

sease, unspecified Haris Telles MD

 

                2020      I10             Essential (primary) hypertension

 Haris Telles MD

 

                2020      E66.3           Overweight      Haris srinivasan MD

 

                2020      N18.3           Chronic kidney disease, stage 3 

(moderate) Haris Telles MD

 

                2020      J44.9           Chronic obstructive pulmonary di

sease, unspecified Haris Telles MD

 

                2020      G30.1           Alzheimer's disease with late on

set Haris Telles MD

 

                2020      I10             Essential (primary) hypertension

 Haris Telles MD







Plan of Treatment

Future Appointment(s):* 2020  3:00 pm - CLARISSA Bajwa at [facility]

* 2020  3:20 pm - CLARISSA Bajwa at [facility]

10/29/2020 - CLARISSA Bajwa* K57.92 Diverticulitis of intestine, part 
  unspecified, without perforation or abscess without bleeding

* I50.41 Acute combined systolic (congestive) and diastolic (congestive) heart 
  failure* New Orders:* CBC and CMP, Scheduled: 20



* Comments:* will increase Torsemide to 100mg daily.will arrange for [facility] to evaluate.





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
  * Blood pressure is controlled on current treatment plan.





* N18.31 Chronic kidney disease, stage 3a

* J44.9 Chronic obstructive pulmonary disease, unspecified* Comments:* will 
  request BMP in one week.





* I48.21 Permanent atrial fibrillation* Comments:* Rate controlled.





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

* Z79.01 Long term (current) use of anticoagulants* Comments:* INR completed at 
  home.  Has been supratherapeutic due to interaction of Warfarin and 
  Metronidazole.  Verbal  instructions given. Signs and symptoms to report re
  viewed.  No evidence of thromboembolic or bleeding events.





* R26.89 Other abnormalities of gait and mobility* New Orders:* Physical 
  Therapy, Ordered: 10/29/20



* Comments:* will request home physical therapy.





* All * Comments:* Will need MARITZA.Tried to discuss [facility] and advance 
  directives.Booklet "hard choices for loving people" given.









Functional Status





                                        Description

 

                                        No Information Available







Mental Status





                                        Description

 

                                        No Information Available







Referrals





                                        Description

 

                                        No Information Available

## 2021-01-22 NOTE — CCD
Summarization Of Episode

                             Created on: 2021



GURWINDER LAGOS

External Reference #: [id number]

: 1935

Sex: Female



Demographics





                          Address                   [address] [place] 

[place] [address]

 

                          Home Phone                [phone]

 

                          Preferred Language        English

 

                          Marital Status            

 

                          [place] Affiliation     [place]

 

                          Race                      White

 

                          Ethnic Group              Not  or 





Author





                          Author                    [facility]

 

                          Organization              [facility]

 

                          Address                   Unknown

 

                          Phone                     Unavailable







Support





                Name            Relationship    Address         Phone

 

                    ROMULO LAGOS Next Of Kin         [id number] 

[place] [address]                    [phone]

 

                    THEKAREN KEARNS      Next Of Kin         [address]

[place] [address]                    [phone]

 

                RE              Next Of Kin     Unknown         Unavailable

 

                    ADOLPH LAGOS    Next Of Kin         [address]

[place] [address]                    [phone]

 

                    TheKaren kearns      ECON                [id number] 

[place] [address]                    [phone]

 

                    Pasha Lagos     ECON                [address]

[place] [address]                    [phone]







Care Team Providers





                    Care Team Member Name Role                Phone

 

                    Yon, L Charu PA Unavailable         Unavailable

 

                    Yon, L Charu PA Unavailable         Unavailable

 

                    Yon, L Charu PA Unavailable         Unavailable

 

                    Yon, L Charu PA Unavailable         Unavailable

 

                    Yon, L Charu PA Unavailable         Unavailable

 

                    Yon, L Charu PA Unavailable         Unavailable

 

                    Yon, L Charu PA Unavailable         Unavailable

 

                    Yon, L Charu PA Unavailable         Unavailable

 

                    Yon, L Charu PA Unavailable         Unavailable

 

                    Yon, L Charu PA Unavailable         Unavailable

 

                    Yon, L Charu PA Unavailable         Unavailable

 

                    Yon, L Charu PA Unavailable         Unavailable

 

                    Yon, L Charu PA Unavailable         Unavailable

 

                    Yon, L Charu PA Unavailable         Unavailable

 

                    Yon, L Charu PA Unavailable         Unavailable

 

                    Yon, L Charu PA Unavailable         Unavailable

 

                    Yon, L Charu PA Unavailable         Unavailable

 

                    Yon, L Charu PA Unavailable         Unavailable

 

                    Yon, L Charu PA Unavailable         Unavailable

 

                    Yon, L Charu PA Unavailable         Unavailable

 

                    Yon, L Charu PA Unavailable         Unavailable

 

                    Yon, L Charu PA Unavailable         Unavailable

 

                    Yon, L Charu PA Unavailable         Unavailable

 

                    HELLEN Zuluaga RNP   Unavailable         Unavailable

 

                    HELLEN Zuluaga RNP   Unavailable         Unavailable

 

                    HELLEN Zuluaga RNP   Unavailable         Unavailable

 

                    HELLEN Zuluaga RNP   Unavailable         Unavailable

 

                    HELLEN Zuluaga RNP   Unavailable         Unavailable

 

                    LePine, M Екатерина RNP   Unavailable         Unavailable

 

                    LePine, M Екатерина RNP   Unavailable         Unavailable

 

                    LePine, M Екатерина RNP   Unavailable         Unavailable

 

                    LePine, M Екатерина RNP   Unavailable         Unavailable

 

                    LePine, M Екатерина RNP   Unavailable         Unavailable

 

                    LePine, M Екатерина RNP   Unavailable         Unavailable

 

                    LePine, M Екатерина RNP   Unavailable         Unavailable

 

                    LePine, M Екатерина RNP   Unavailable         Unavailable

 

                    LePine, M Екатерина RNP   Unavailable         Unavailable

 

                    LePine, M Екатерина RNP   Unavailable         Unavailable

 

                    LePine, M Екатерина RNP   Unavailable         Unavailable

 

                    LePine, M Екатерина RNP   Unavailable         Unavailable

 

                    LePine, M Екатерина RNP   Unavailable         Unavailable

 

                    LePine, M Екатерина RNP   Unavailable         Unavailable

 

                    LePine, M Екатерина RNP   Unavailable         Unavailable

 

                    LePine, M Екатерина RNP   Unavailable         Unavailable

 

                    LePine, M Екатерина RNP   Unavailable         Unavailable

 

                    LePine, M Екатерина RNP   Unavailable         Unavailable

 

                    LePine, M Екатерина RNP   Unavailable         Unavailable

 

                    LePine, M Екатерина RNP   Unavailable         Unavailable

 

                    LePine, M Екатерина RNP   Unavailable         Unavailable

 

                    LePine, M Екатерина RNP   Unavailable         Unavailable

 

                    LePine, M Екатерина RNP   Unavailable         Unavailable

 

                    LePine, M Екатерина RNP   Unavailable         Unavailable

 

                    LePine, M Екатерина RNP   Unavailable         Unavailable

 

                    LePine, M Екатерина RNP   Unavailable         Unavailable

 

                    LePine, M Екатерина RNP   Unavailable         Unavailable

 

                    LePine, M Екатерина RNP   Unavailable         Unavailable

 

                    LePine, M Екатерина RNP   Unavailable         Unavailable

 

                    LePine, M Екатерина RNP   Unavailable         Unavailable

 

                    LePine, M Екатерина RNP   Unavailable         Unavailable

 

                    LePine, M Екатерина RNP   Unavailable         Unavailable

 

                    LePine, M Екатерина RNP   Unavailable         Unavailable

 

                    LePine, M Екатерина RNP   Unavailable         Unavailable

 

                    LePine, M Екатерина RNP   Unavailable         Unavailable

 

                    LePine, M Екатерина RNP   Unavailable         Unavailable

 

                    LePine, M Екатерина RNP   Unavailable         Unavailable

 

                    LePine, M Екатерина RNP   Unavailable         Unavailable

 

                    LePine, M Екатерина RNP   Unavailable         Unavailable

 

                    LePine, M Екатерина RNP   Unavailable         Unavailable

 

                    LePine, M Екатерина RNP   Unavailable         Unavailable

 

                    LePine, M Екатерина RNP   Unavailable         Unavailable

 

                    LePine, M Екатерина RNP   Unavailable         Unavailable

 

                    LePine, M Екатерина RNP   Unavailable         Unavailable

 

                    LePine, M Екатерина RNP   Unavailable         Unavailable

 

                    LePine, M Екатерина RNP   Unavailable         Unavailable

 

                    LePine, M Екатерина RNP   Unavailable         Unavailable

 

                    LePine, M Екатерина RNP   Unavailable         Unavailable

 

                    LePine, M Екатерина RNP   Unavailable         Unavailable

 

                    LePine, M Екатерина RNP   Unavailable         Unavailable

 

                    THIERNO MARC MD Unavailable         Unavailable

 

                    THIERNO MARC MD Unavailable         Unavailable

 

                    THIERNO MARC MD Unavailable         Unavailable

 

                    THIERNO MARC MD Unavailable         Unavailable

 

                    THIERNO MARC MD Unavailable         Unavailable

 

                    THIERNO MARC MD Unavailable         Unavailable

 

                    THIERNO MARC MD Unavailable         Unavailable

 

                    THIERNO MARC MD Unavailable         Unavailable

 

                    TARI, MELYNNE ROGER MD Unavailable         Unavailable

 

                    TARI, MELYNNE ROGER MD Unavailable         Unavailable

 

                    TARI, MELYNNE ROGER MD Unavailable         Unavailable

 

                    TARI, MELYNNE ROGER MD Unavailable         Unavailable

 

                    TARI, MELYNNE ROGER MD Unavailable         Unavailable

 

                    TARI, MELYNNE ROGER MD Unavailable         Unavailable

 

                    TARI, MELYNNE ROGER MD Unavailable         Unavailable

 

                    TARI, MELYNNE ROGER MD Unavailable         Unavailable

 

                    TARI, MELYNNE ROGER MD Unavailable         Unavailable

 

                    TARI, MELYNNE ROGER MD Unavailable         Unavailable

 

                    TARI, MELYNNE ROGER MD Unavailable         Unavailable

 

                    TAIR, MELYNNE ROGER MD Unavailable         Unavailable

 

                    TARI, MELYNNE ROGER MD Unavailable         Unavailable

 

                    TARI, MELYNNE ROGER MD Unavailable         Unavailable

 

                    TARI, MELYNNE ROGER MD Unavailable         Unavailable

 

                    TARI, MELYNNE ROGER MD Unavailable         Unavailable

 

                    TARI, MELYNNE ROGER MD Unavailable         Unavailable

 

                    TARI, MELYNNE ROGER MD Unavailable         Unavailable

 

                    TARI, MELYNNE ROGER MD Unavailable         Unavailable

 

                    TARI, MELYNNE ROGER MD Unavailable         Unavailable

 

                    TARI, MELYNNE ROGER MD Unavailable         Unavailable

 

                    TARI, MELYNNE ROGER MD Unavailable         Unavailable

 

                    TARI, MELYNNE ROGER MD Unavailable         Unavailable

 

                    TARI, MELYNNE ROGER MD Unavailable         Unavailable

 

                    TARI, MELYNNE ROGER MD Unavailable         Unavailable

 

                    TARI, MELYNNE ROGER MD Unavailable         Unavailable

 

                    TARI, MELYNNE ROGER MD Unavailable         Unavailable

 

                    TARI, MELYNNE ROGER MD Unavailable         Unavailable

 

                    TARI, MELYNNE ROGER MD Unavailable         Unavailable

 

                    TARI, MELYNNE ROGER MD Unavailable         Unavailable

 

                    TARI, MELYNNE ROGER MD Unavailable         Unavailable

 

                    TARI, MELYNNE ROGER MD Unavailable         Unavailable



                                  



Re-disclosure Warning

          The records that you are about to access may contain information from 
federally-assisted alcohol or drug abuse programs. If such information is 
present, then the following federally mandated warning applies: This information
has been disclosed to you from records protected by federal confidentiality 
rules (42 CFR part 2). The federal rules prohibit you from making any further 
disclosure of this information unless further disclosure is expressly permitted 
by the written consent of the person to whom it pertains or as otherwise 
permitted by 42 CFR part 2. A general authorization for the release of medical 
or other information is NOT sufficient for this purpose. The Federal rules 
restrict any use of the information to criminally investigate or prosecute any 
alcohol or drug abuse patient.The records that you are about to access may 
contain highly sensitive health information, the redisclosure of which is 
protected by Article 27-F of the [facility] Public Health law. If you 
continue you may have access to information: Regarding HIV / AIDS; Provided by 
facilities licensed or operated by the [facility] Office of Mental Health; 
or Provided by the [facility] Office for People With Developmental 
Disabilities. If such information is present, then the following [place] 
mandated warning applies: This information has been disclosed to you from 
confidential records which are protected by state law. State law prohibits you 
from making any further disclosure of this information without the specific 
written consent of the person to whom it pertains, or as otherwise permitted by 
law. Any unauthorized further disclosure in violation of state law may result in
a fine or detention sentence or both. A general authorization for the release of 
medical or other information is NOT sufficient authorization for further disc
losure.                                                                         
    



Allergies and Adverse Reactions

          



           Type       Description Substance  Reaction   Status     Data Source(s

)

 

           Penicillin Penicillin Penicillin            active     [facility] ([facility])

 

           codeine    codeine    codeine               active     [facility] ([facility])

 

           chlorpromazine chlorpromazine chlorpromazine            active     NE

[facility] ([facility])



                                                                                
                           



Family History

          



             Family Member Name Family Member Gender Family Member Status Date o

f Status 

Description                             Data Source(s)

 

           Unknown    Unknown    Problem                          [facility] ([facility]

 

           Unknown    Male       Problem                          [facility] ([facility]

 

           Unknown    Male       Problem                          [facility] ([facility])

 

           Unknown    Male       Problem                          [facility] ([facility] )

 

                                        () 



                                                                                
                                     



Encounters

          



           Encounter  Providers  Location   Date       Indications Data Source(s

)

 

           Outpatient Attender: Charu [facility] Main Office 2020 01:15:0

0 PM EST            

[facility] ([facility])

 

                                            2020 12:00:00 AM EST - 

020 08:17:58 AM EST            [facility] 

([facility])

 

                Outpatient      Attender: Екатерина Siegel 10/29

/2020 11:20:00 AM EDT

                                                    [facility] ([facility]

)

 

                Outpatient      Attender: Екатерина Zuluaga LISSY Siegel 09/10

/2020 01:40:00 PM EDT

                                                    [facility] ([facility]

)

 

                Outpatient      Attender: Екатерина Zuluaga LISSY Siegel 06/10

/2020 02:00:00 PM EDT

                                                    [facility] ([facility]

)

 

                Outpatient      Attender: Екатерина Zuluaga LISSY Siegel  01:00:00 PM EST

                                                    [facility] ([facility]

)

 

           Outpatient Attender: Charu [facility] Main Office 2020 12:30:0

0 PM EST            

[facility] ([facility])

 

                Outpatient      Attender: ROGER Flores/Sarabjit/Fredo/LORRAINE campo 2020 

12:30:00 PM EST                                     [facility] ([facility]

[facility], )

 

                Outpatient      Attender: Екатерина Siegel  01:40:00 PM EST

                                                    [facility] ([facility]

)



                                                                                
                                                                                
      



Immunizations

          



             Vaccine      Date         Status       Description  Data Source(s)

 

                          INFLUENZA VACCINE QUADRIVALENT  (65 YR UP)/MF59

C.1/PF 10/07/2020 12:00:00

AM EDT              completed                               [facility]

 

                                        This CVX code allows reporting of a vacc

ination when formulation is unknown (for

example, when recording a Influenza vaccination when noted on a vaccination 
card)           10/05/2020 08:35:00 AM EDT completed                       [facility]

T ([facility])

 

             TB Skin test is not vaccine. 2020 10:44:00 AM EDT completed  

               [facility] 

([facility])

 

                          Influenza, injectable, MDCK, preservative free, dylan

valent 2019 02:11:00

PM EST              completed                               [facility]



                                                                                
                                     



Medications

          



          Medication Brand Name Start Date Product Form Dose      Route     Admi

nistrative 

Instructions Pharmacy Instructions Status     Indications Reaction   Description

 Data 

Source(s)

 

     Biaxin 500 MG Biaxin 2020 12:00:00 AM EST                          co

mpleted                

[facility] ([facility])

 

                    Ipratropium-Albuterol 0.5-2.5 (3) MG/3ML Ipratropium-Albuter

ol 2020 

12:00:00 AM EST                                    completed                    

  [facility] ([facility])

 

       Klor-Con M10 10 MEQ Klor-Con M10 2020 12:00:00 AM EST              

                      completed 

                                                            [facility] ([facility])

 

        Spironolactone 25 MG Spironolactone 2020 12:00:00 AM EST          

                               

completed                                                       [facility] ([facility])

 

           Clarithromycin 500 MG Oral Tablet Clarithromycin 2020 12:00:00 

AM EST                       

                              active                                  [facility] ([facility]

 

        torsemide 100 MG Oral Tablet Torsemide 2020 12:00:00 AM EST       

          ORAL                    

active                                                          [facility] ([facility])

 

          Acetaminophen 325 MG Oral Tablet Acetaminophen 2020 12:00:00 AM 

EST                               

                      active                                      [facility] ([facility]

 

      Xopenex 0.63 MG/3ML Xopenex 2020 12:00:00 AM EST                    

           completed              

                                        [facility] ([facility]

)

 

      Coumadin 3 MG Coumadin 2020 12:00:00 AM EST       3.0 {mg}          

         completed        

                                                    [facility] ([facility])

 

                    Albuterol Sulfate (2.5 MG/3ML) 0.083% Albuterol Sulfate    12:00:00 AM 

EST           0 {mg}                      completed                      [facility]

T ([facility])

 

     Tylenol Tylenol 2020 12:00:00 AM EST      325.0 {mg}                c

ompleted                

[facility] ([facility])

 

        Torsemide 100 MG Torsemide 2020 12:00:00 AM EST         1.0 {table

t}                         

completed                                                       [facility] ([facility])

 

        Multivitamin Multivitamin 2020 12:00:00 AM EST         1.0 {tablet

}                         

completed                                                       [facility] ([facility])

 

        Simvastatin 40 MG Simvastatin 2020 12:00:00 AM EST         40.0 {m

g}                         

completed                                                       [facility] ([facility])

 

        Levothyroxine Levothyroxine 2020 12:00:00 AM EST         25.0 {mcg

}                         

completed                                                       [facility] ([facility])

 

      Aricept 5 MG Aricept 2020 12:00:00 AM EST       5.0 {mg}            

       completed              

                                        [facility] ([facility]

)

 

             Incruse Ellipta 62.5 MCG/INH Incruse Ellipta 2020 12:00:00 AM

 EST              62.5 

{mcg}                           completed                         [facility] ([facility])

 

      Vitamin D Vitamin D 2020 12:00:00 AM EST       125.0 {mcg}          

         completed        

                                                    [facility] ([facility])

 

             Advair Diskus 100-50 MCG/DOSE Advair Diskus 2020 12:00:00 AM 

EST              0 {mcg}

                                completed                         [facility] ([facility])

 

     Colace Colace 2020 12:00:00 AM EST      100.0 {mg}                com

pleted                

[facility] ([facility])

 

     Calcium Calcium 2020 12:00:00 AM EST      0 {mg}                compl

eted                

[facility] ([facility])

 

                    Levalbuterol 0.21 MG/ML Inhalant Solution [Xopenex] Xopenex 

            10/26/2020 12:00:00 

AM EDT                                    active                      [facility] ([facility])

 

                    Culturelle Digestive Health Probiotic Culturelle Digestive H

ealth Probiotic 

10/23/2020 12:00:00 AM EDT                                    active            

          [facility] ([facility])

 

                Metronidazole 500 MG Oral Tablet METRONIDAZOLE   10/22/2020 12:0

0:00 AM EDT tablet

                9                               TAKE ONE TABLET BY MOUTH THREE T

IMES A DAY TAKE ONE TABLET BY MOUTH THREE 

TIMES A DAY  SOLD: 10/22/2020                                        [facility]

s

 

          500 mg              10/22/2020 12:00:00 AM EDT tablet    6            

       TAKE ONE TABLET BY MOUTH TWICE A 

DAY        TAKE ONE TABLET BY MOUTH TWICE A DAY SOLD: 10/22/2020                

                  [facility]

 

          62.5 mcg/actuation           2020 12:00:00 AM EDT blister with d

evice 30                  INHALE 

ONE PUFF BY MOUTH EVERY DAY INHALE ONE PUFF BY MOUTH EVERY DAY SOLD: 2020 

   

                                                            [facility]

 

          100-50 mcg/dose           2020 12:00:00 AM EDT blister with neida

ce 60                  INHALE ONE

 PUFF BY MOUTH TWICE A DAY INHALE ONE PUFF BY MOUTH TWICE A DAY SOLD: 2020

                                                                [facility]

 

          100 mg              2020 12:00:00 AM EDT tablet    60           

       TAKE ONE-HALF TABLET BY MOUTH 

TWO TIMES A DAY     TAKE ONE-HALF TABLET BY MOUTH TWO TIMES A DAY SOLD: 20

20     

                                                            [facility]

 

          100 mg              2020 12:00:00 AM EDT tablet    60           

       TAKE ONE-HALF TABLET BY MOUTH 

TWO TIMES A DAY     TAKE ONE-HALF TABLET BY MOUTH TWO TIMES A DAY SOLD: 20

20     

                                                            [facility]

 

          25 mcg              2020 12:00:00 AM EDT tablet    90           

       TAKE 1 TABLET BY MOUTH ONCE 

DAILY      TAKE 1 TABLET BY MOUTH ONCE DAILY SOLD: 2020                   

               [facility]

 

          5 mg                2020 12:00:00 AM EDT tablet    90           

       TAKE ONE TABLET BY MOUTH AT 

BEDTIME    TAKE ONE TABLET BY MOUTH AT BEDTIME SOLD: 2020                 

                 [facility]

 

                    Cholecalciferol 5000 UNT Oral Capsule Vitamin D3 Ultra Stren

gth 2020 

12:00:00 AM EST               ORAL                 active                      M

EDENT ([facility])

 

                          7 ACTUAT umeclidinium 0.0625 MG/ACTUAT Dry Powder Inha

ler [Incruse] Incruse 

Ellipta 2020 12:00:00 AM EST             RESPIRATORY             active   

                MEDENT 

([facility])

 

       Administration Of Flu Vaccine        2019 12:00:00 AM EST          

                          completed  

                                                            MED[facility] ([facility])

 

                                        Medication administered onsite 



                                                                                
                                                                                
                                                                                
                                                                                
                                                                              



Insurance Providers

          



             Payer name   Policy type / Coverage type Policy ID    Covered party

 ID Covered 

party's relationship to alcaraz Policy Alcaraz             Plan Information

 

          MEDICARE            [id number]           SP                  [id number]

[id number]

 

          [facility]           [id number]           SP               

   [id number]

 

           FOR LIFE           [id number]           HU2                 [phone]

[id number]

 

          MEDICARE  C         [id number]           S                   [id number]

[id number]

 

          UMR       O         [id number]           S                   [id number]

 

           FOR LIFE O         [id number]           S                   [id number]

[id number]

 

          WPS  For Life Medigap Part B [id number]           Family Depende

nt           [id number]

 

          Medicare [employer] Medicare Primary [id number]           Self 

               [id number]

 

          [employer]/Umr (Old) Medigap Part B [id number]           Self               

 [id number]

 

          Umr (New [employer]) Medigap Part B [id number]           Self               

 [id number]

 

          WPS  For Life Medigap Part B [id number]           Family Depende

nt           [id number]

 

          Medicare [employer] Medicare Primary [id number]           Self 

               [id number]

 

           For Life Commercial [id number]           Family Dependent      

     [id number]

 

          Medicare  Medicare Primary [id number]           Self                2

[id number]

 

          Umr       Commercial [id number]           Self                [id number]

 

           For Life - WPS Medigap Part B [id number]           Family Depen

dent           [id number]

 

          [employer] PHCS Ppo Medigap Part B [id number]           Self                

[id number]

 

          Umr       Medigap Part B [id number]           Self                Y19

[id number]

 

          Medicare (Part B) Medicare Primary [id number]           Self         

       [id number]

 

          [employer]     Medigap Part B [id number]           Self                [id number]

0109

 

           For Life - WPS Medigap Part B [id number]           Family Depen

dent           [id number]

 

          [employer] PHCS Ppo Medigap Part B [id number]           Self                

[id number]

 

          Umr       Medigap Part B [id number]           Self                Y19

[id number]

 

          Medicare (Part B) Medicare Primary [id number]           Self         

       [id number]

 

          WPS  For Life Medigap Part B [id number]           Family Depende

nt           [id number]

 

          Medicare Nat[employer] Medicare Primary [id number]           Self 

               [id number]

 

          [employer]     Medigap Part B [id number]           Self                [id number]

0109

 

          WPS  For Life Medigap Part B [id number]           Family Depende

nt           [id number]

 

          Umr       Commercial [id number]           Self                [id number]

[id number]

 

          [place] Medicare Primary [id number]           Self      

          [id number]

 

           For Life - WPS Medigap Part B [id number]           Family Depen

dent           [id number]

 

          [employer] PHCS Ppo Medigap Part B [id number]           Self                

[id number]

 

          Umr       Medigap Part B [id number]           Self                Y19

[id number]

 

          Medicare (Part B) Medicare Primary [id number]           Self         

       [id number]

 

           For Life Commercial [id number]           Family Dependent      

     [id number]

 

          Medicare  Medicare Primary [id number]           Self                2

[id number]

 

          MEDICARE            [id number]           SP                  [id number]

A

 

           For Life Commercial [id number]           Family Dependent      

     [id number]

 

          Medicare  Medicare Primary [id number]           Self                2

[id number]

 

           For Life - WPS Medigap Part B [id number]           Family Depen

dent           [id number]

 

          [employer] PHCS Ppo Medigap Part B [id number]           Self                

[id number]

 

          Umr       Medigap Part B [id number]           Self                Y19

[id number]

 

          Medicare (Part B) Medicare Primary [id number]           Self          

      [id number]

 

          [place] MED[place] PART B C         [id number]           S               

    [id number]

 

          [place] [employer] (TFL) Medigap Part B [id number]           Self     

           [id number]

 

          [employer] (pr) Medigap Part B [id number]           Self                8900

66898

 

          Umr (pr)  Medigap Part B [id number]         

  Self                

[id number]

 

          Medicare Dme Supplies Medigap Part B [id number]           Self        

        [id number]

 

          Med[place]are [place] Medicare Primary [id number]           Self           

     [id number]

 

          [employer]               [id number]           SP                  [id number]

 

          WPS  For Life Medigap Part B 457681271           Family Depende

nt           [id number]

 

          Medicare [place] Medicare Primary [id number]           Self       

         [id number]

 

          [employer]     Medigap Part B [id number]           Self                31571

0109

 

          WPS  For Life Medigap Part B 691152861           Family Depende

nt           [phone]

 

          Medicare Natl Govt [employer]ic Medicare Primary [id number]           Self  

              [id number]

 

          WPS  For Life Medigap Part B 305953965           Family Depende

nt           [id number]

 

          [employer]/Umr (Old) Medigap Part B [id number]           Self               

 [id number]

 

          Medicare Natl Govt Servic Medicare Primary [id number]           Self  

              [id number]

 

          [place] [employer] (TFL) Medigap Part B [id number]           Self     

           [id number]

 

          [employer] (pr) Medigap Part B 854340152           Self                8900

01818

 

          Umr (pr)  Medigap Part B [id number]         

  Self                

[id number]

 

          Medicare Dme Supplies Medigap Part B [id number]           Self        

        [id number]

 

          Med[place]are [place] Medicare Primary [id number]           Self           

     [id number]

 

           For Life - WPS Medigap Part B [id number]           Family Depen

dent           [id number]

 

          [employer] PHCS Ppo Medigap Part B [id number]           Self                

[id number]

 

          Medicare (Part B) Medicare Primary [id number]           Self          

      [id number]

 

          [place] [employer] (TFL) Medigap Part B [id number]           Self     

           [id number]

 

          [employer] (pr) Medigap Part B [id number]           Self                8900

62943

 

          Medicare Dme Supplies Medigap Part B [id number]           Self        

        [id number]

 

          Medicare [place] Medicare Primary [id number]           Self           

     [id number]

 

          [place] [employer] (TFL) Medigap Part B [id number]           Self     

           [id number]

 

          [employer] (pr) Medigap Part B [id number]           Self                8900

89618

 

          Medicare [place] Medicare Primary [id number]           Self           

     [id number]

 

          [place] [employer] (TFL) Medigap Part B [id number]           Self     

           [id number]

 

          [employer] (pr) Medigap Part B [id number]           Self                8900

76253

 

          Medicare [place] Medicare Primary [id number]           Self           

     [id number]

 

          [employer] PPO O         [id number]           S                   [id number]

 

          MEDICARE           [id number]           S                   [id number]

A

 

           For Life - WPS Medigap Part B [id number]           Family Depen

dent           [id number]

 

          [employer] PHCS Ppo Medigap Part B [id number]           Self                

[id number]

 

          Medicare (Part B) Medicare Primary [id number]           Self          

      [id number]

 

          WPS  For Life Medigap Part B 996445265           Family Depende

nt           [id number]

 

          Medicare [place]/[employer] Medicare Primary [id number]           Self       

         [id number]

 

          WPS  For Life Medigap Part B 197473128           Family Depende

nt           [id number]

 

          Medicare Natl Govt Serv Medicare Primary [id number]           Self  

              [id number]

 

          Umr [employer] Ppo Medigap Part B [id number]           Self                8

[id number]

 

          WPS  For Life Medigap Part B 775422236           Family Depende

nt           [id number]

 

          Medicare [place]/[employer] Medicare Primary [id number]           Self       

         [id number]

 

           For Life - WPS Medigap Part B [id number]           Family Depen

dent           [id number]

 

          [employer] PHCS Ppo Medigap Part B 425980138           Self                

[id number]

 

          Medicare (Part B) Medicare Primary [id number]           Self          

      [id number]

 

          WPS  For Life Medigap Part B 694773048           Family Depende

nt           [id number]

 

          Medicare Natl Govt Servic Medicare Primary [id number]           Self  

              [id number]

 

          WPS  For Life Medigap Part B 522503829           Family Depende

nt           [id number]

 

          Medicare Natl Govt [employer]ic Medicare Primary [id number]           Self  

              [id number]

 

          WPS  For Life Medigap Part B 239281067           Family Depende

nt           [id number]

 

          Medicare Natl Govt [employer]ic Medicare Primary [id number]           Self  

              [id number]

 

          [employer] Ppo Medigap Part B [id number]           Self                69821

0109

 

          WPS  For Life Medigap Part B 802679454           Family Depende

nt           [id number]

 

          Medicare Natl Govt Servic Medicare Primary [id number]           Self  

              [id number]

 

          WPS  For Life Medigap Part B 084320701           Family Depende

nt           [id number]

 

          Medicare [employer] Gov[employer]ic Medicare Primary [id number]           Self  

              [id number]

 

          WPS  For Life Medigap Part B 826768213           Family Depende

nt           [id number]

 

          Medicare [employer] Gov[employer]ic Medicare Primary [id number]           Self  

              [id number]

 

          WPS  For Life Medigap Part B 694298299           Family Depende

nt           [id number]

 

          Medicare Nat[employer] Govt [employer]ic Medicare Primary [id number]           Self  

              [id number]

 

          WPS  For Life Medigap Part B 660192648           Family Depende

nt           [id number]

 

          Medicare Natl Govt Servic Medicare Primary [id number]           Self  

              [id number]

 

          WPS  For Life Medigap Part B 635304017           Family Depende

nt           [id number]

 

          Medicare [employer] Gov[employer]ic Medicare Primary [id number]           Self  

              [id number]

 

          WPS  For Life Medigap Part B 775261946           Family Depende

nt           [id number]

 

          Medicare Natl Govt Serv Medicare Primary [id number]           Self  

              [id number]

 

          WPS  For Life Medigap Part B 584286216           Family Depende

nt           [id number]

 

          Medicare Nat[employer] Govt Servic Medicare Primary [id number]           Self  

              [id number]

 

          WPS  For Life Medigap Part B 422169980           Family Depende

nt           [id number]

 

          Medicare Natl Govt Serv Medicare Primary [id number]           Self  

              [id number]

 

          WPS  For Life Medigap Part B 768029692           Family Depende

nt           [phone]

 

          Medicare Natl Govt Servic Medicare Primary [id number]           Self  

              [id number]

 

          WPS  For Life Medigap Part B 467928639           Family Depende

nt           [phone]

 

          Medicare Natl Govt Serv Medicare Primary [id number]           Self  

              [id number]

 

          WPS  For Life Medigap Part B 472014897           Family Depende

nt           [id number]

 

          Medicare Natl Govt Serv Medicare Primary [id number]           Self  

              [id number]

 

          WPS  For Life Medigap Part B 839829860           Family Depende

nt           [id number]

 

          Medicare Natl Govt Serv Medicare Primary [id number]           Self  

              [id number]

 

          WPS  For Life Medigap Part B 762155756           Family Depende

nt           [id number]

 

          Medicare Natl Govt Serv Medicare Primary [id number]           Self  

              [id number]

 

          WPS  For Life Medigap Part B                     Family Depende

nt            

 

          [employer] Ppo Medigap Part B 910                 Self                910

 

          Medicare Natl Govt Serv Medicare Primary                     Self   

              

 

           FOR LIFE           [id number]           2                 [id number]

[id number]

 

                              [id number]                               [id number]

A

 

                              [id number]                               [id number]

 

                              [id number]                               [id number]



                                                                                
                                                                                
                                                                                
                                                                                
                                                                                
                                                                                
                                                                                
                                                                                
                                                                                
                                                                                
                                                                                
                                                                                
                                                                                
                                                                                
                                                                                
                                                                                
                                                                                
                                                



Problems, Conditions, and Diagnoses

          



           Code       Display Name Description Problem Type Effective Dates Data

 Source(s)

 

                          K57.92                    Diverticulitis of intestine,

 part unspecified, without perforation or 

abscess without bleeding                Diverticulitis of intestine, part unspec

ified, without 

perforation or abscess without bleeding Problem             10/20/2020 01:00:00 

AM RAJESH JOHNSON ([facility])

 

                          I13.0                     Hypertensive heart and chron

ic kidney disease with heart failure and stage

 1 through stage 4 chronic kidney disease, or unspecified chronic kidney disease
                                        Hypertensive heart and chronic kidney di

sease with heart failure and stage 1 

through stage 4 chronic kidney disease, or unspecified chronic kidney disease 

Problem                   10/20/2020 01:00:00 AM EDT [facility] ([facility])

 

                          I50.41                    Acute combined systolic (con

gestive) and diastolic (congestive) heart 

failure                                 Acute combined systolic (congestive) and

 diastolic (congestive) heart 

failure             Problem             10/20/2020 01:00:00 AM EDT [facility] ([facility])

 

                N18.31          Chronic kidney disease, stage 3a Chronic kidney 

disease, stage 3a Problem

                          10/20/2020 01:00:00 AM EDT [facility] ([facility])

 

             G30.9        Alzheimer's disease, unspecified Alzheimer's disease, 

unspecified Problem      

10/20/2020 01:00:00 AM EDT              [facility] ([facility]

)

 

                          F02.80                    Dementia in other diseases c

lassified elsewhere without behavioral 

disturbance                             Dementia in other diseases classified el

sewhere without behavioral 

disturbance         Problem             10/20/2020 01:00:00 AM EDT [facility] ([facility])

 

             I48.21       Permanent atrial fibrillation Permanent atrial fibrill

ation Problem      

10/20/2020 01:00:00 AM EDT              [facility] ([facility]

)

 

                    M19.90              Unspecified osteoarthritis, unspecified 

site Unspecified osteoarthritis, 

unspecified site    Problem             10/20/2020 01:00:00 AM EDT [facility] ([facility])

 

                    M81.0               Age-related osteoporosis without current

 pathological fracture Age-related

 osteoporosis without current pathological fracture Problem                   10

/ 01:00:00 

AM EDT                                  [facility] ([facility]

)

 

                    Z51.81              Encounter for therapeutic drug level mon

itoring Encounter for therapeutic

 drug level monitoring Problem             10/20/2020 01:00:00 AM EDT [facility] (

[facility])

 

                    Z79.01              Long term (current) use of anticoagulant

s Long term (current) use of 

anticoagulants      Problem             10/20/2020 01:00:00 AM EDT [facility] ([facility])

 

                    Z79.51              Long term (current) use of inhaled stero

ids Long term (current) use of 

inhaled steroids    Problem             10/20/2020 01:00:00 AM EDT [facility] ([facility])

 

           Z91.81     History of falling History of falling Problem    10/20/202

0 01:00:00 AM EDT 

[facility] ([facility])

 

             Z95.0        Presence of cardiac pacemaker Presence of cardiac pace

maker Problem      

10/20/2020 01:00:00 AM EDT              [facility] ([facility]

)

 

                Z95.2           Presence of prosthetic heart valve Presence of p

rosthetic heart valve 

Problem                   10/20/2020 01:00:00 AM EDT [facility] ([facility])

 

             G93.40       Encephalopathy, unspecified Encephalopathy, unspecifie

d Problem      

10/20/2020 01:00:00 AM EDT              [facility] ([facility]

)

 

                    J44.9               Chronic obstructive pulmonary disease, u

nspecified Chronic obstructive 

pulmonary disease, unspecified Problem             10/20/2020 01:00:00 AM EDT NE

[facility] 

([facility])

 

                    J44.1               Chronic obstructive pulmonary disease wi

th (acute) exacerbation Chronic 

obstructive pulmonary disease with (acute) exacerbation Problem                 

  10/03/2020 

01:00:00 AM EDT                         [facility] ([facility]

)



                                                                                
                                                                                
                                                                                
                            



Surgeries/Procedures

          



             Procedure    Description  Date         Indications  Data Source(s)

 

             ECG ROUTINE ECG W/LEAST 12 LDS W/I&R              2020 12:00:

00 AM EST              [facility] 

([facility])

 

             ECG ROUTINE ECG W/LEAST 12 LDS W/I&R              2020 12:00:

00 AM EST              [facility] 

([facility])



                                                                                
                  



Results

          



                    ID                  Date                Data Source

 

                    [id number]                 2021 12:00:00 AM EST [facility]









          Name      Value     Range     Interpretation Code Description Data Cinthya

rce(s) Supporting 

Document(s)

 

          [facility] Antigen Negative                                [facility]

     

 

                                        This lab was ordered by [facility] and reported by [facility]. 









                    ID                  Date                Data Source

 

                    [id number]          2020 07:37:00 AM EST [facility] ([facility])









          Name      Value     Range     Interpretation Code Description Data Cinthya

rce(s) Supporting 

Document(s)

 

           Laboratory test finding (navigational concept) 0.01 ng/mL 0.00-0.08  

                      MEDENT 

([facility])                   









                    ID                  Date                Data Source

 

                    [id number]          2020 07:35:00 AM EST MED[facility] ([facility])









          Name      Value     Range     Interpretation Code Description Data Cinthya

rce(s) Supporting 

Document(s)

 

           Laboratory test finding (navigational concept) 40.0 %     38.0-51.0  

                      MEDENT 

([facility])                   

 

           Laboratory test finding (navigational concept) 89 mg/dL        

                      [facility] 

([facility] [facility]                   

 

           Laboratory test finding (navigational concept) 138 meq/L  136-145    

                      [facility] 

([facility])                   

 

           Laboratory test finding (navigational concept) 3.7 meq/L  3.5-5.1    

                      [facility] 

([facility])                   

 

           Laboratory test finding (navigational concept) 4.6 mg/dL  4.5-5.3    

                      [facility] 

([facility])                   

 

           Laboratory test finding (navigational concept) 98 meq/L        

                      [facility] 

([facility])                   

 

           Laboratory test finding (navigational concept) 32.0 MM/L  23.0-27.0  

                      [facility] 

([facility])                   

 

           Laboratory test finding (navigational concept) 1.1 mg/dL  0.6-1.3    

                      [facility] 

([facility])                   

 

           Laboratory test finding (navigational concept) 26 mg/dL   8-26       

                      [facility] 

([facility])                   









                    ID                  Date                Data Source

 

                    [id number]          2020 07:19:00 AM EST [facility] ([facility])









          Name      Value     Range     Interpretation Code Description Data Cinthya

rce(s) Supporting 

Document(s)

 

          Bedside Glucose 83 mg/dL                          [facility] ([facility])  









                    ID                  Date                Data Source

 

                    [id number]          2020 07:15:00 AM EST [facility] ([facility])









          Name      Value     Range     Interpretation Code Description Data Cinthya

rce(s) Supporting 

Document(s)

 

          Inr       2.38                                    [facility]  

 

                                        THERAPUTIC HUMAN INR VALUES

INDICATIONS                      NORMAL RANGES

PROPHYLAXIS/TREATMENT OF:

VENOUS THROMBOSIS                2.0-3.0

PULMONARY EMBOLISM               2.0-3.0

PREVENTION OF SYSTEMIC EMBOLISM FROM:

TISSUE HEART VALVES              2.0-3.0

ACUTE MYOCARDIAL INFARCTION      2.0-3.0

VALVULAR HEART DISEASE           2.0-3.0

ATRIAL FIBRILLATION              2.0-3.0

MECHANICAL VALVES(HIGH RISK)     2.5-3.5

RECURRENT MYOCARDIAL INFARCTION  2.5-3.5

 

 

          Prothrombin Time 26.5 s    12.5-14.3                     [facility] ([place]

[facility])  









                    ID                  Date                Data Source

 

                    [id number]          2020 07:15:00 AM EST [facility] ([facility])









          Name      Value     Range     Interpretation Code Description Data Cinthya

rce(s) Supporting 

Document(s)

 

                          Thyrotropin [Units/volume] in Serum or Plasma by Detec

tion limit <= 0.05 mIU/L 

5.120 uIU/ML 0.358-3.740                            [facility] ([facility]

)  









                    ID                  Date                Data Source

 

                    [id number]          2020 07:15:00 AM EST [facility] ([facility])









          Name      Value     Range     Interpretation Code Description Data Cinthya

rce(s) Supporting 

Document(s)

 

          White Blood Count 8.5 10    4.0-10.0                      [facility] ([facility])  

 

          Red Blood Count 4.35 10   4.00-5.40                     [facility] ([facility])  

 

          Hemoglobin 13.0 g/dL 12.0-15.5                     [facility]  

 

          Hematocrit 40.6 %    36.0-47.0                     [facility] ([facility])  

 

          Mean Corpuscular Hemoglobin 29.9 pg   27.0-33.0                     ME

DENT ([facility]) 

 

 

          Mean Corpuscular Volume 93.3 fl   80.0-96.0                     [facility]

 ([facility])  

 

          Platelet Count, Automated 194 10    150-450                       [facility]

NT ([facility])  

 

          Red Cell Distribution Width 15.2 %    11.5-14.5                     ME

DENT ([facility])  

 

          Mean Corpuscular HGB Conc 32.0 g/dL 32.0-36.5                     [facility]

NT ([facility]) 

 

 

          Mono %    18.0 %    0.0-5.0                       [facility]  

 

          Neutrophils % 65.5 %    36.0-66.0                     [facility] ([facility])  

 

          Lymph %   12.8 %    24.0-44.0                     [facility])  

 

          Eos %     2.2 %     0.0-3.0                       [facility] ([facility]

[facility])  

 

          Immature Granulocyte % 0.4 %     0-3.0                         [facility] 

([facility])  

 

          Nucleated Red Blood Cell % 0.0 %     0-0                           MED

ENT ([facility])  

 

          Baso %    1.1 %     0.0-1.0                       [facility] ([facility]

[facility])  

 

          Mono #    1.5 10    0.0-0.8                       [facility] ([facility]

[facility])  

 

          Lymph #   1.1 10    1.5-5.0                       [facility])  

 

          Neutrophils # 5.5 10    1.5-8.5                       [facility] ([facility])  

 

          Eos #     0.2 10    0.0-0.5                       [facility] ([facility]

[facility])  

 

          Baso #    0.1 10    0.0-0.2                       [facility] ([facility]

[facility])  









                    ID                  Date                Data Source

 

                    [id number]          2020 02:30:00 PM EST [facility] ([facility])









          Name      Value     Range     Interpretation Code Description Data Cinthya

rce(s) Supporting 

Document(s)

 

          Glucose, Fasting 99 mg/dL                          [facility] ([place]

[facility])  

 

          Creatinine For GFR 1.07 mg/dL 0.55-1.30                     [facility] ([facility])  

 

          Blood Urea Nitrogen 21 mg/dL  7-18                          [facility] ([facility])  

 

          Glomerular Filtration Rate 52.1                                    MED

ENT ([facility])  

 

                                        <content>Units are mL/min/1.73 

m2</content><br/><content></content><br/><content>Chronic Kidney Disease Staging
 per NKF:</content><br/><content></content><br/><content>Stage I & II   GFR >=60
       Normal to Mildly Decreased</content><br/><content>Stage III      GFR 30-
59      Moderately Decreased</content><br/><content>Stage IV       GFR 15-29    
  Severely Decreased</content><br/><content>Stage V        GFR <15        Very 
Little GFR Left</content><br/><content>ESRD           GFR <15 on 
RRT</content><br/><content></content> 

 

          Sodium Level 136 meq/L 136-145                       [facility] ([facility])  

 

          Potassium Serum 4.2 meq/L 3.5-5.1                       [facility] ([facility])  

 

          Chloride Level 95 meq/L                          [facility] ([facility])  

 

          Carbon Dioxide Level 35 meq/L  21-32                         [facility] ([facility])  

 

          Anion Gap 6 meq/L   8-16                          [facility] ([facility]

[facility])  

 

          Calcium Level 10.1 mg/dL 8.8-10.2                      [facility] ([facility])  

 

          Ast/Sgot  32 U/L    7-37                          [facility] ([facility]

[facility])  

 

          Alt/SGPT  23 U/L    12-78                         [facility] ([facility]

[facility])  

 

          Alkaline Phosphatase 66 U/L                            [facility] ([facility])  

 

          Total Protein 6.6 GM/DL 6.4-8.2                       [facility] ([facility])  

 

          Bilirubin,Total 0.7 mg/dL 0.2-1.0                       [facility] ([facility])  

 

          Albumin/Globulin Ratio 1.3       1.2-2.2                       [facility] 

([facility])  

 

          Albumin   3.7 GM/DL 3.2-5.2                       [facility] ([facility]

[facility])  









                    ID                  Date                Data Source

 

                    [id number]          2020 02:30:00 PM EST [facility] ([facility])









          Name      Value     Range     Interpretation Code Description Data Cinthya

rce(s) Supporting 

Document(s)

 

          Hemoglobin 13.3 g/dL 12.0-15.5                     [facility]  

 

          White Blood Count 7.8 10    4.0-10.0                      [facility] ([facility])  

 

          Red Blood Count 4.46 10   4.00-5.40                     [facility] ([facility])  

 

          Mean Corpuscular Volume 93.3 fl   80.0-96.0                     [facility]

 ([facility])  

 

          Mean Corpuscular Hemoglobin 29.8 pg   27.0-33.0                     ME

DENT ([facility]) 

 

 

          Hematocrit 41.6 %    36.0-47.0                     [facility] ([facility]

[facility])  

 

          Platelet Count, Automated 231 10    150-450                       [facility]

NT ([facility])  

 

          Mean Corpuscular HGB Conc 32.0 g/dL 32.0-36.5                     [facility]

NT ([facility]) 

 

 

          Red Cell Distribution Width 15.9 %    11.5-14.5                     [facility]

D[facility] ([facility])  

 

          Nucleated Red Blood Cell % 0.0 %     0-0                           [facility]

[facility]  









                    ID                  Date                Data Source

 

                    [id number]          10/20/2020 09:34:00 AM EDT [facility]









          Name      Value     Range     Interpretation Code Description Data Cinthya

rce(s) Supporting 

Document(s)

 

           Laboratory test finding (navigational concept) 0.02 ng/mL 0.00-0.08  

                      [facility] 

[facility]                   









                    ID                  Date                Data Source

 

                    [id number]          10/20/2020 09:29:00 AM EDT [facility]









          Name      Value     Range     Interpretation Code Description Data Cinthya

rce(s) Supporting 

Document(s)

 

           Laboratory test finding (navigational concept) 0.90       0.4-2.0    

                      [facility] 

([facility])                   









                    ID                  Date                Data Source

 

                    [id number]          10/20/2020 09:28:00 AM EDT [facility]









          Name      Value     Range     Interpretation Code Description Data Cinthya

rce(s) Supporting 

Document(s)

 

           Laboratory test finding (navigational concept) 41.0 %     38.0-51.0  

                      [facility] 

([facility])                   

 

           Laboratory test finding (navigational concept) 136 meq/L  136-145    

                      [facility] 

([facility])                   

 

           Laboratory test finding (navigational concept) 3.9 meq/L  3.5-5.1    

                      [facility] 

([facility])                   

 

           Laboratory test finding (navigational concept) 107 mg/dL       

                      [facility] 

([facility])                   

 

           Laboratory test finding (navigational concept) 95 meq/L        

                      [facility] 

([facility])                   

 

           Laboratory test finding (navigational concept) 30.0 MM/L  23.0-27.0  

                      [facility] 

([facility])                   

 

           Laboratory test finding (navigational concept) 5.1 mg/dL  4.5-5.3    

                      [facility] 

([facility])                   

 

           Laboratory test finding (navigational concept) 30 mg/dL   8-26       

                      [facility] 

([facility])                   

 

           Laboratory test finding (navigational concept) 1.2 mg/dL  0.6-1.3    

                      [facility] 

([facility])                   









                    ID                  Date                Data Source

 

                    [id number]          10/20/2020 09:28:00 AM EDT [facility] ([facility])









          Name      Value     Range     Interpretation Code Description Data Cinthya

rce(s) Supporting 

Document(s)

 

           Laboratory test finding (navigational concept) 31.3 s     12.1-14.4  

                      [facility] 

([facility])                   

 

           Laboratory test finding (navigational concept) 2.7                   

                      [facility] ([facility])                              









                    ID                  Date                Data Source

 

                    [id number]          10/20/2020 08:53:00 AM EDT [facility] ([facility])









          Name      Value     Range     Interpretation Code Description Data Cinthya

rce(s) Supporting 

Document(s)

 

          White Blood Count 14.2 10   4.0-10.0                      [facility] ([facility])  

 

          Red Blood Count 4.35 10   4.00-5.40                     [facility] ([facility])  

 

          Mean Corpuscular Volume 92.4 fl   80.0-96.0                     [facility]

 ([facility])  

 

          Hemoglobin 13.1 g/dL 12.0-15.5                     [facility] ([facility]  

 

          Hematocrit 40.2 %    36.0-47.0                     [facility] ([facility]

[facility])  

 

          Mean Corpuscular Hemoglobin 30.1 pg   27.0-33.0                     ME

D[facility] ([facility]) 

 

 

          Mean Corpuscular HGB Conc 32.6 g/dL 32.0-36.5                     [facility]

NT ([facility]) 

 

 

          Red Cell Distribution Width 15.3 %    11.5-14.5                     ME

D[facility] ([facility])  

 

          Lymph %   5.6 %     24.0-44.0                     [facility])  

 

          Neutrophils % 75.0 %    36.0-66.0                     [facility] ([facility])  

 

          Platelet Count, Automated 178 10    150-450                       [facility]

NT ([facility])  

 

          Mono %    17.9 %    0.0-5.0                       [facility])  

 

          Baso %    0.4 %     0.0-1.0                       [facility])  

 

          Eos %     0.5 %     0.0-3.0                       [facility] ([facility]

[facility])  

 

          Nucleated Red Blood Cell % 0.0 %     0-0                           MED

ENT ([facility])  

 

          Immature Granulocyte % 0.6 %     0-3.0                         [facility] 

([facility])  

 

          Neutrophils # 10.7 10   1.5-8.5                       [facility] ([facility])  

 

          Lymph #   0.8 10    1.5-5.0                       [facility] ([facility]

[facility])  

 

          Eos #     0.1 10    0.0-0.5                       [facility] ([facility]

[facility])  

 

          Mono #    2.5 10    0.0-0.8                       [facility] ([facility]

[facility])  

 

          Baso #    0.1 10    0.0-0.2                       [facility] ([facility]

[facility])  









                    ID                  Date                Data Source

 

                    [id number]          10/20/2020 08:53:00 AM EDT [facility] ([facility])









          Name      Value     Range     Interpretation Code Description Data Cinthya

rce(s) Supporting 

Document(s)

 

           Ammonia [Mass/volume] in Blood Laboratory test result                

                  [facility]                              









                    ID                  Date                Data Source

 

                    [id number]          10/20/2020 08:53:00 AM EDT [facility] ([facility])









          Name      Value     Range     Interpretation Code Description Data Cinthya

rce(s) Supporting 

Document(s)

 

           Appearance, Urine RFX Laboratory test result                         

         [facility] ([facility])

                                         

 

           Specific Gravity Ur Auto RFX 1.012      1.002-1.035                  

     [facility] ([facility])                              

 

          Color, Urine RFX Laboratory test result                               

[facility] ([facility])  

 

          PH,Urine RFX 6.0 units 5.0-9.0                       [facility] ([facility])  

 

           Glucose, Urine (Ua) Auto RFX Laboratory test result                  

                [facility] ([facility])                              

 

           Protein, Urine Auto RFX Laboratory test result                       

           [facility] ([facility]                              

 

           Ketone, Urine Auto RFX Laboratory test result                        

          [facility]                              

 

           Urobilinogen, Urine Auto RFX 0.2 mg/dL  0.0-2.0                      

    [facility]

                                         

 

           Bilirubin, Urine Auto RFX Laboratory test result                     

             [facility] ([facility]                              

 

           Nitrite, Urine Auto RFX Laboratory test result                       

           [facility] ([facility])                              

 

          WBC, Urine Auto RFX 1 /HPF    0-3                           [facility]  

 

           Leukocyte Esterase Ur Auto RFX Laboratory test result                

                  [facility]                              

 

           Blood, Urine Blood RFX Laboratory test result                        

          [facility] ([facility])                              

 

          Squam Epithelial Cell Ur Aurfx 0 /HPF    0-6                          

 [facility] ([facility])  

 

          RBC, Urine Auto RFX 1 /HPF    0-3                           [facility]  

 

           Bacteria, Urine Auto RFX Laboratory test result                      

            [facility] ([facility])                              

 

          Hyaline Cast, Urine Auto RFX 0 /LPF    0-1                           [facility]  









                    ID                  Date                Data Source

 

                    [id number]          10/20/2020 08:53:00 AM EDT [facility]









          Name      Value     Range     Interpretation Code Description Data Cinthya

rce(s) Supporting 

Document(s)

 

          CPK Creatine Phosphokinase 62 U/L                            MED

ENT ([facility])  

 

          Troponin I Laboratory test result                               [facility]

 ([facility])  

 

                                        <content>Troponin I Reference Interval f

or Siemens Linwood 

LOCI:</content><br/><content></content><br/><content>99th Percentile= 0.00-0.045
 ng/ml</content><br/><content></content><br/><content>Risk 
Stratification:</content><br/><content><= 0.10 ng/ml   Decreased Risk for 
Adverse Clinical</content><br/><content>Events.</content><br/><content>0.10-1.50
 ng/ml   Increased Risk for Adverse Clinical</content><br/><content>Events. 
Evaluation of additional</content><br/><content>criterion and/or repeat testing 
in 2-6</content><br/><content>hours is suggested to rule out 
myocardial</content><br/><content>damage.</content><br/><content>>= 1.50 ng/ml  
 Indicative of Myocardial Injury.</content><br/><content></content> 

 

          MB/CK Relative Index 1.61                                    [facility] ([facility]  

 

                                        <content>DIAGNOSIS CRITERIA</content><br

/><content>MMB ng/ml       Relative 

Index (RI)</content><br/><content>NON-AMI               < or = 5               
N/A</content><br/><content>GRAY ZONE              > 5                < or = 
4</content><br/><content>AMI                    > 5                   > 
4</content><br/><content></content> 

 

          CK-MB Value Mass Laboratory test result                               

[facility] ([facility])  









                    ID                  Date                Data Source

 

                    [id number]          10/20/2020 08:53:00 AM EDT [facility] ([facility])









          Name      Value     Range     Interpretation Code Description Data Cinthya

rce(s) Supporting 

Document(s)

 

          Ast/Sgot  23 U/L    7-37                          [facility] ([facility])  

 

          Alkaline Phosphatase 72 U/L                            [facility] ([facility]  

 

          Alt/SGPT  24 U/L    12-78                         [facility] ([facility])  

 

          Bilirubin,Total 1.2 mg/dL 0.2-1.0                       [facility] ([facility])  

 

          Albumin   3.7 GM/DL 3.2-5.2                       [facility] ([facility])  

 

          Total Protein 6.7 GM/DL 6.4-8.2                       [facility] ([facility])  

 

          Bilirubin,Direct 0.4 mg/dL 0.0-0.2                       [facility] [place] [facility]  

 

          Albumin/Globulin Ratio 1.2       1.2-2.2                       [facility] 

[facility]  









                    ID                  Date                Data Source

 

                    [id number]          10/20/2020 08:53:00 AM EDT [facility]









          Name      Value     Range     Interpretation Code Description Data Cinthya

rce(s) Supporting 

Document(s)

 

                    Lipoprotein lipase [Enzymatic activity/volume] in Serum or P

lasma 157 U/L             

                                                [facility]  

 

                          Thyrotropin [Units/volume] in Serum or Plasma by Detec

tion limit <= 0.05 mIU/L 

3.480 uIU/ML 0.358-3.740                            [facility] ([facility]

)  









                    ID                  Date                Data Source

 

                    [id number]          10/20/2020 08:53:00 AM EDT [facility] ([facility])









          Name      Value     Range     Interpretation Code Description Data Cinthya

rce(s) Supporting 

Document(s)

 

          Blood Type Laboratory test result                               [facility]

 ([facility])  

 

           AB Screen (Indirect Anatoly)Vis Laboratory test result                

                  [facility] ([facility])                              









                    ID                  Date                Data Source

 

                    [id number]            09/10/2020 01:37:00 PM EDT [facility] ([facility]









          Name      Value     Range     Interpretation Code Description Data Cinthya

rce(s) Supporting 

Document(s)

 

           Leukocytes [#/volume] in Blood by Automated count 7.4 x10*3/UL 4.1-10

.9                         

[facility] ([facility])    

 

           Erythrocytes [#/volume] in Blood by Automated count 5.02 x10*6/UL 4.2

0-6.30                        

[facility] ([facility])    

 

           Hemoglobin [Mass/volume] in Blood 14.8 g/dL  12.0-18.0               

         [facility] ([facility])                       

 

           Hematocrit [Volume Fraction] of Blood by Automated count 44.4 %     3

7.0-51.0                        

[facility] ([facility])    

 

          MCV       88.5 fL   80.0-97.0                     [facility] ([facility]  

 

          MCH       29.4 pg   26.0-32.0                     [facility] ([facility]  

 

          MCHC      33.3 g/dL 31.0-38.0                     [facility] ([facility]  

 

           Erythrocyte distribution width [Ratio] by Automated count 14.3 %     

11.6-13.7                        

[facility] ([facility])    

 

           Platelets [#/volume] in Blood by Automated count 208 x10*3/-440

                          [facility]

 ([facility])          

 

             Platelet mean volume [Entitic volume] in Blood by Dioni 8.8 FL

       7.8-11.0                   

                          [facility] ([facility])  

 

           Lymphocytes/100 leukocytes in Blood by Automated count 16.7 %     10.

0-58.5                        

[facility] ([facility])    

 

          Mid %     4.6 %     1.7-9.3                       [facility] ([facility]  

 

          Neut %    78.7 %    37.0-92.0                     [facility] ([facility]  

 

          Mid #     0.4 x10*3/UL 0.1-0.6                       [facility] ([facility])  

 

          Lymph #   1.2 x10*3/UL 0.6-4.1                       [facility] ([facility])  

 

           Neutrophils [#/volume] in Semen by Manual count 5.8 x10*3/UL 2.0-7.8 

                         [facility] 

([facility])           









                    ID                  Date                Data Source

 

                    [id number]          09/10/2020 01:37:00 PM EDT [facility] ([facility])









          Name      Value     Range     Interpretation Code Description Data Cinthya

rce(s) Supporting 

Document(s)

 

                          Thyrotropin [Units/volume] in Serum or Plasma by Detec

tion limit <= 0.05 mIU/L 

3.19 uIU/mL  0.36-3.74                              [facility] ([facility]

)  









                    ID                  Date                Data Source

 

                    [id number]          09/10/2020 01:37:00 PM EDT [facility] ([facility])









          Name      Value     Range     Interpretation Code Description Data Cinthya

rce(s) Supporting 

Document(s)

 

           Urea nitrogen [Mass/volume] in Serum or Plasma 24 mg/dL   7-18       

                      [facility] 

([facility])                   

 

           Glucose [Mass/volume] in Serum or Plasma 100 mg/dL  74-99            

                [facility] ([facility])                              

 

                                        100-125 mg/dL     PRE-DIABETES/FASTING

>126 mg/dL          DIABETES/FASTING

 

 

           Sodium [Moles/volume] in Serum or Plasma 139 meq/L  136-145          

                [facility] ([facility])                             

 

           Chloride [Moles/volume] in Serum or Plasma 99 meq/L            

                  [facility] ([facility])                             

 

           Potassium [Moles/volume] in Serum or Plasma 4.3 meq/L  3.5-5.1       

                   [facility] 

([facility])                   

 

          Creatinine 1.4 mg/dL 0.6-1.3                       [facility] ([facility]

[facility])  

 

           Carbon dioxide, total [Moles/volume] in Serum or Plasma 36 meq/L   21

-32                            

[facility] ([facility])            

 

                                        Glomerular filtration rate/1.73 sq M pre

dicted among non-blacks [Volume 

Rate/Area] in Serum or Plasma by Creatinine-based formula (MDRD) 36 mL/min      

                                  

                          [facility] ([facility])  

 

           Calcium [Mass/volume] in Serum or Plasma 9.9 mg/dL  8.5-10.1         

                [facility] 

([facility])                   

 

                                        Glomerular filtration rate/1.73 sq M pre

dicted among blacks [Volume Rate/Area] 

in Serum or Plasma by Creatinine-based formula (MDRD) 44 mL/min                 

                          [facility] 

([facility])                   

 

                                        <content>CHRONIC KIDNEY DISEASE STAGING 

PER 

NKF</content><br/><content></content><br/><content>STAGE I & II      GFR >= 60  
     NORMAL TO MILDLY DECREASED</content><br/><content>STAGE III          GFR 
30-59          MODERATELY DECREASED</content><br/><content>STAGE IV           
GFR 15-29         SEVERELY DECREASED</content><br/><content>STAGE V            
GFR <15            VERY LITTLE GFR LEFT</content><br/><content>ESRD             
   GFR <15            ON RRT</content><br/><content></content> 









                    ID                  Date                Data Source

 

                    [id number]          09/10/2020 01:37:00 PM EDT [facility] ([facility])









          Name      Value     Range     Interpretation Code Description Data Cinthya

rce(s) Supporting 

Document(s)

 

           Hemoglobin [Mass/volume] in Blood 14.8 g/dL  12.0-18.0               

         [facility] ([facility]                              

 

           Erythrocytes [#/volume] in Blood by Automated count 5.02 x10*6/UL 4.2

0-6.30                        

[facility]            

 

           Leukocytes [#/volume] in Blood by Automated count 7.4 x10*3/UL 4.1-10

.9                         

[facility] ([facility])            

 

          MCV       88.5 fL   80.0-97.0                     [facility] ([facility])  

 

           Hematocrit [Volume Fraction] of Blood by Automated count 44.4 %     3

7.0-51.0                        

[facility] ([facility])            

 

          MCH       29.4 pg   26.0-32.0                     [facility] ([facility])  

 

          MCHC      33.3 g/dL 31.0-38.0                     [facility] ([facility])  

 

           Erythrocyte distribution width [Ratio] by Automated count 14.3 %     

11.6-13.7                        

[facility] ([facility])            

 

           Platelets [#/volume] in Blood by Automated count 208 x10*3/-440

                          [facility]

 ([facility])                  

 

          Lymph %   16.7 %    10.0-58.5                     [facility] ([facility])  

 

          MPV       8.8 FL    7.8-11.0                      [facility] ([facility]

[facility])  

 

          Mid %     4.6 %     1.7-9.3                       [facility] ([facility])  

 

          Neut %    78.7 %    37.0-92.0                     [facility] ([facility])  

 

          Lymph #   1.2 x10*3/UL 0.6-4.1                       [facility] ([facility])  

 

          Mid #     0.4 x10*3/UL 0.1-0.6                       [facility] ([facility])  

 

          Neut #    5.8 x10*3/UL 2.0-7.8                       [facility] ([facility])  









                    ID                  Date                Data Source

 

                    [id number]          2020 10:56:00 AM [facility]









          Name      Value     Range     Interpretation Code Description Data Cinthya

rce(s) Supporting 

Document(s)

 

           INR in Platelet poor plasma by Coagulation assay 3.1                 

                        [facility] ([facility])                              









                    ID                  Date                Data Source

 

                    [id number]          2020 04:04:00 PM EDT [facility] ([facility])









          Name      Value     Range     Interpretation Code Description Data Cinthya

rce(s) Supporting 

Document(s)

 

           INR in Platelet poor plasma by Coagulation assay 2.5                 

                        [facility] ([facility]                              









                    ID                  Date                Data Source

 

                    [id number]          06/10/2020 01:38:00 PM EDT [facility] ([facility])









          Name      Value     Range     Interpretation Code Description Data Cinthya

rce(s) Supporting 

Document(s)

 

          Magnesium 2.1 mg/dL 1.8-2.4                       [facility] ([facility])  









                    ID                  Date                Data Source

 

                    [id number]          06/10/2020 01:38:00 PM EDT [facility] ([facility])









          Name      Value     Range     Interpretation Code Description Data Cinthya

rce(s) Supporting 

Document(s)

 

           Glucose [Mass/volume] in Serum or Plasma 76 mg/dL   74-99            

                [facility] ([facility])                              

 

                                        100-125 mg/dL     PRE-DIABETES/FASTING

>126 mg/dL          DIABETES/FASTING

 

 

          Creatinine 1.2 mg/dL 0.6-1.3                       [facility] ([facility]

[facility])  

 

           Sodium [Moles/volume] in Serum or Plasma 144 meq/L  136-145          

                [facility] ([facility])                             

 

           Urea nitrogen [Mass/volume] in Serum or Plasma 22 mg/dL   7-18       

                      [facility] 

([facility])                   

 

           Potassium [Moles/volume] in Serum or Plasma 4.2 meq/L  3.5-5.1       

                   [facility] 

([facility])                   

 

           Carbon dioxide, total [Moles/volume] in Serum or Plasma 33 meq/L   21

-32                            

[facility] ([facility])            

 

           Chloride [Moles/volume] in Serum or Plasma 104 meq/L           

                  [facility] 

([facility])                   

 

                                        Glomerular filtration rate/1.73 sq M pre

dicted among non-blacks [Volume 

Rate/Area] in Serum or Plasma by Creatinine-based formula (MDRD) 43 mL/min      

                                  

                          [facility] ([facility])  

 

           Calcium [Mass/volume] in Serum or Plasma 9.7 mg/dL  8.5-10.1         

                [facility] 

([facility])                   

 

                                        Glomerular filtration rate/1.73 sq M pre

dicted among blacks [Volume Rate/Area] 

in Serum or Plasma by Creatinine-based formula (MDRD) 52 mL/min                 

                          [facility] 

([facility])                   

 

                                        <content>CHRONIC KIDNEY DISEASE STAGING 

PER 

NKF</content><br/><content></content><br/><content>STAGE I & II      GFR >= 60  
     NORMAL TO MILDLY DECREASED</content><br/><content>STAGE III          GFR 
30-59          MODERATELY DECREASED</content><br/><content>STAGE IV           
GFR 15-29         SEVERELY DECREASED</content><br/><content>STAGE V            
GFR <15            VERY LITTLE GFR LEFT</content><br/><content>ESRD             
   GFR <15            ON RRT</content><br/><content></content> 









                    ID                  Date                Data Source

 

                    [id number]          06/10/2020 01:38:00 PM EDT [facility] ([facility])









          Name      Value     Range     Interpretation Code Description Data Cinthya

rce(s) Supporting 

Document(s)

 

           Leukocytes [#/volume] in Blood by Automated count 8.1 x10*3/UL 4.1-10

.9                         

[facility] ([facility])            

 

           Hemoglobin [Mass/volume] in Blood 14.8 g/dL  12.0-18.0               

         [facility] ([facility]                              

 

           Erythrocytes [#/volume] in Blood by Automated count 5.07 x10*6/UL 4.2

0-6.30                        

[facility] ([facility])            

 

           Hematocrit [Volume Fraction] of Blood by Automated count 44.9 %     3

7.0-51.0                        

[facility] ([facility])            

 

          MCHC      33.1 g/dL 31.0-38.0                     [facility] ([facility])  

 

          MCH       29.3 pg   26.0-32.0                     [facility] ([facility])  

 

          MCV       88.5 fL   80.0-97.0                     [facility] ([facility])  

 

          MPV       8.7 FL    7.8-11.0                      [facility] ([facility]

[facility])  

 

           Erythrocyte distribution width [Ratio] by Automated count 14.2 %     

11.6-13.7                        

[facility] ([facility])            

 

           Platelets [#/volume] in Blood by Automated count 206 x10*3/-440

                          [facility]

 ([facility])                  

 

          Lymph %   16.9 %    10.0-58.5                     [facility]  

 

          Mid %     4.7 %     1.7-9.3                       [facility] ([facility])  

 

          Neut %    78.4 %    37.0-92.0                     [facility] ([facility])  

 

          Lymph #   1.3 x10*3/UL 0.6-4.1                       [facility] ([facility])  

 

          Neut #    6.3 x10*3/UL 2.0-7.8                       [facility] ([facility])  

 

          Mid #     0.5 x10*3/UL 0.1-0.6                       [facility] ([facility])  









                    ID                  Date                Data Source

 

                    [id number]          2020 01:59:00 PM EST [facility] ([facility])









          Name      Value     Range     Interpretation Code Description Data Cinthya

rce(s) Supporting 

Document(s)

 

          Lymphocytes 5 %       16-44                         [facility] ([facility]  

 

          Neutrophils 70 %      28-66                         [facility] ([facility]  

 

          Eosinophils 4 %       0-3                           [facility] ([facility]  

 

          Monocytes 2 %       0-5                           [facility] ([facility])  

 

          Basophils 2 %       0-1                           [facility])  

 

          Atypical Lymph 17 %      0-5                           [facility] ([facility])  

 

          Poikilocytosis Laboratory test result                               ME

[facility] ([facility])  

 

          Ovalocytes Laboratory test result                               [facility]

 ([facility])  

 

          Platelet Estimate Laboratory test result                              

 [facility] ([facility])  









                    ID                  Date                Data Source

 

                    [id number]          2020 01:59:00 PM EST [facility] ([facility])









          Name      Value     Range     Interpretation Code Description Data Cinthya

rce(s) Supporting 

Document(s)

 

           Glucose [Mass/volume] in Serum or Plasma 104 mg/dL  74-99            

                [facility] ([facility])                              

 

                                        100-125 mg/dL     PRE-DIABETES/FASTING

>126 mg/dL          DIABETES/FASTING

 

 

           Urea nitrogen [Mass/volume] in Serum or Plasma 19 mg/dL   7-18       

                      [facility] 

([facility])                   

 

          Creatinine 1.2 mg/dL 0.6-1.3                       [facility] ([facility]

[facility])  

 

           Sodium [Moles/volume] in Serum or Plasma 144 meq/L  136-145          

                [facility] ([facility])                             

 

           Potassium [Moles/volume] in Serum or Plasma 4.0 meq/L  3.5-5.1       

                   [facility] 

([facility])                   

 

           Chloride [Moles/volume] in Serum or Plasma 102 meq/L           

                  [facility] 

([facility])                   

 

                                        Glomerular filtration rate/1.73 sq M pre

dicted among non-blacks [Volume 

Rate/Area] in Serum or Plasma by Creatinine-based formula (MDRD) 43 mL/min      

                                  

                          [facility] ([facility])  

 

           Carbon dioxide, total [Moles/volume] in Serum or Plasma 36 meq/L   21

-32                            

[facility] ([facility])            

 

           Calcium [Mass/volume] in Serum or Plasma 10.0 mg/dL 8.5-10.1         

                [facility] 

([facility])                   

 

                                        Glomerular filtration rate/1.73 sq M pre

dicted among blacks [Volume Rate/Area] 

in Serum or Plasma by Creatinine-based formula (MDRD) 52 mL/min                 

                          [facility] 

[facility]                   

 

                                        <content>CHRONIC KIDNEY DISEASE STAGING 

PER 

NKF</content><br/><content></content><br/><content>STAGE I & II      GFR >= 60  
     NORMAL TO MILDLY DECREASED</content><br/><content>STAGE III          GFR 
30-59          MODERATELY DECREASED</content><br/><content>STAGE IV           
GFR 15-29         SEVERELY DECREASED</content><br/><content>STAGE V            
GFR <15            VERY LITTLE GFR LEFT</content><br/><content>ESRD             
   GFR <15            ON RRT</content><br/><content></content> 









                    ID                  Date                Data Source

 

                    [id number]          2020 01:59:00 PM EST [facility] ([facility])









          Name      Value     Range     Interpretation Code Description Data Cinthya

rce(s) Supporting 

Document(s)

 

           Leukocytes [#/volume] in Blood by Automated count 8.0 x10*3/UL 4.1-10

.9                         

[facility] ([facility])            

 

           Hemoglobin [Mass/volume] in Blood 14.0 g/dL  12.0-18.0               

         [facility] ([facility]                              

 

           Erythrocytes [#/volume] in Blood by Automated count 4.88 x10*6/UL 4.2

0-6.30                        

[facility] ([facility])            

 

          MCV       87.4 fL   80.0-97.0                     [facility] ([facility]

[facility])  

 

           Hematocrit [Volume Fraction] of Blood by Automated count 42.7 %     3

7.0-51.0                        

[facility] ([facility])            

 

          MCHC      32.9 g/dL 31.0-38.0                     [facility] ([facility]

[facility])  

 

          MCH       28.7 pg   26.0-32.0                     [facility] ([facility]

[facility])  

 

           Platelets [#/volume] in Blood by Automated count 207 x10*3/-440

                          [facility]

 ([facility])                  

 

           Erythrocyte distribution width [Ratio] by Automated count 14.3 %     

11.6-13.7                        

[facility] ([facility])            

 

          MPV       8.3 FL    7.8-11.0                      [facility] ([facility]

[facility])  

 

          Lymph %   8.8 %     10.0-58.5                     [facility] ([facility]

[facility])  

 

                                        NOTE:

MANUAL DIFFERENTIAL SENT TO [facility] FOR VERIFICATION.





 

 

          Mid %     10.9 %    1.7-9.3                       [facility] ([facility]

[facility])  

 

          Lymph #   0.7 x10*3/UL 0.6-4.1                       [facility] ([facility])  

 

          Neut %    80.3 %    37.0-92.0                     [facility] ([facility]

[facility])  

 

          Neut #    6.4 x10*3/UL 2.0-7.8                       [facility] ([facility])  

 

          Mid #     0.9 x10*3/UL 0.1-0.6                       [facility] ([facility])  









                    ID                  Date                Data Source

 

                    [id number]          2019 03:42:00 PM EST [facility]









          Name      Value     Range     Interpretation Code Description Data Cinthya

rce(s) Supporting 

Document(s)

 

           Bacteria identified in Urine by Culture FULL REPORT IN L <SEE NOTE>  

                                [facility]

 [facility]                  

 

                                        FULL REPORT IN LAB NOTES (e[facility] and [facility]

).

NO GROWTH



 









                    ID                  Date                Data Source

 

                    [id number]          2019 03:42:00 PM EST [facility]









          Name      Value     Range     Interpretation Code Description Data Cinthya

rce(s) Supporting 

Document(s)

 

          Urine PH  6.0 units 5.0-9.0                       [facility] ([facility]

[facility])  

 

           Urine Appearance SL. HAZY              Abnormal (applies to non-numer

ic results)            [facility] 

([facility])                   

 

           Urine Color STRAW                 Abnormal (applies to non-numeric re

sults)            [facility] 

([facility])                   

 

           Urine Blood TRACE                 Abnormal (applies to non-numeric re

sults)            [facility] 

[facility]                   

 

          Urine Leukocytes NEGATIVE                                [facility] [facility]  

 

          Specific gravity of Urine 1.015     1.005-1.030                     [place] [facility] ([facility])  

 

          Glucose [Presence] in Urine NEGATIVE mg/dL                            

   [facility] ([facility])  

 

          Urine Protein NEGATIVE  0-0                           [facility] ([facility])  

 

          Urine Nitrite NEGATIVE                                [facility] ([facility])  

 

          Urine Urobilinogen 0.2 mg/dL 0.2-1.0                       [facility] ([facility])  

 

          Urine Ketone NEGATIVE mg/dL                               [facility] ([facility])  

 

           Bilirubin.total [Mass/volume] in Serum or Plasma NEGATIVE            

                        [facility] 

([facility])                   









                    ID                  Date                Data Source

 

                    [id number]          2019 03:42:00 PM EST [facility] ([facility])









          Name      Value     Range     Interpretation Code Description Data Cinthya

rce(s) Supporting 

Document(s)

 

           Glucose [Mass/volume] in Serum or Plasma 133 mg/dL  74-99            

                [facility] ([facility])                              

 

                                        100-125 mg/dL     PRE-DIABETES/FASTING

>126 mg/dL          DIABETES/FASTING

 

 

           Urea nitrogen [Mass/volume] in Serum or Plasma 20 mg/dL   7-18       

                      [facility] 

([facility])                   

 

           Sodium [Moles/volume] in Serum or Plasma 141 meq/L  136-145          

                [facility] ([facility])                             

 

          Creatinine 1.3 mg/dL 0.6-1.3                       [facility] ([facility]

[facility])  

 

           Potassium [Moles/volume] in Serum or Plasma 3.7 meq/L  3.5-5.1       

                   [facility] 

([facility])                   

 

           Carbon dioxide, total [Moles/volume] in Serum or Plasma 36 meq/L   21

-32                            

[facility] ([facility])            

 

           Chloride [Moles/volume] in Serum or Plasma 99 meq/L            

                  [facility] ([facility])                             

 

                                        Glomerular filtration rate/1.73 sq M pre

dicted among non-blacks [Volume 

Rate/Area] in Serum or Plasma by Creatinine-based formula (MDRD) 39 mL/min      

                                  

                          [facility] ([facility])  

 

                                        Glomerular filtration rate/1.73 sq M pre

dicted among blacks [Volume Rate/Area] 

in Serum or Plasma by Creatinine-based formula (MDRD) 48 mL/min                 

                          [facility] 

([facility])                   

 

                                        <content>CHRONIC KIDNEY DISEASE STAGING 

PER 

NKF</content><br/><content></content><br/><content>STAGE I & II      GFR >= 60  
     NORMAL TO MILDLY DECREASED</content><br/><content>STAGE III          GFR 
30-59          MODERATELY DECREASED</content><br/><content>STAGE IV           
GFR 15-29         SEVERELY DECREASED</content><br/><content>STAGE V            
GFR <15            VERY LITTLE GFR LEFT</content><br/><content>ESRD             
   GFR <15            ON RRT</content><br/><content></content> 

 

           Calcium [Mass/volume] in Serum or Plasma 10.0 mg/dL 8.5-10.1         

                [facility] 

([facility])                   









                    ID                  Date                Data Source

 

                    [id number]            2019 03:42:00 PM EST [facility] ([facility]









          Name      Value     Range     Interpretation Code Description Data Cinthya

rce(s) Supporting 

Document(s)

 

           Urea nitrogen [Mass/volume] in Serum or Plasma 20 mg/dL   7-18       

                      [facility] 

([facility])           

 

           Glucose [Mass/volume] in Serum or Plasma 133 mg/dL  74-99            

                [facility] ([facility]                       

 

                                        100-125 mg/dL     PRE-DIABETES/FASTING

>126 mg/dL          DIABETES/FASTING



 

 

           Sodium [Moles/volume] in Serum or Plasma 141 meq/L  136-145          

                [facility] 

([facility])           

 

          Creatinine 1.3 mg/dL 0.6-1.3                       [facility] ([facility]  

 

           Potassium [Moles/volume] in Serum or Plasma 3.7 meq/L  3.5-5.1       

                   [facility] 

([facility])           

 

           Chloride [Moles/volume] in Serum or Plasma 99 meq/L            

                  [facility] 

([facility])           

 

           Carbon dioxide, total [Moles/volume] in Serum or Plasma 36 meq/L   21

-32                            

[facility] ([facility])    

 

           Calcium [Mass/volume] in Serum or Plasma 10.0 mg/dL 8.5-10.1         

                [facility] 

([facility])           

 

                                        Glomerular filtration rate/1.73 sq M pre

dicted among blacks [Volume Rate/Area] 

in Serum or Plasma by Creatinine-based formula (MDRD) 48 mL/min                 

                          [facility] 

([facility])           

 

                                        <content>CHRONIC KIDNEY DISEASE STAGING 

PER 

NKF</content><br/><content></content><br/><content>STAGE I & II      GFR >= 60  
     NORMAL TO MILDLY DECREASED</content><br/><content>STAGE III          GFR 
30-59          MODERATELY DECREASED</content><br/><content>STAGE IV           
GFR 15-29         SEVERELY DECREASED</content><br/><content>STAGE V            
GFR <15            VERY LITTLE GFR LEFT</content><br/><content>ESRD             
   GFR <15            ON 
RRT</content><br/><content></content><br/><content></content> 

 

                                        Glomerular filtration rate/1.73 sq M pre

dicted among non-blacks [Volume 

Rate/Area] in Serum or Plasma by Creatinine-based formula (MDRD) 39 mL/min      

                                  

                          [facility] ([facility])  

 

           Urine Color Straw                 Abnormal (applies to non-numeric re

sults)            [facility] 

([facility])           

 

           Urine Appearance SL. Hazy              Abnormal (applies to non-numer

ic results)            [facility] 

([facility])           

 

          Urine Leukocytes Negative                                [facility] ([facility]

[facility])  

 

           Specific gravity of Urine 1.015      1.005-1.030                     

  [facility] ([facility]                                  

 

          pH of Urine 6.0 units 5.0-9.0                       [facility] ([facility])  

 

           Urine Blood Trace                 Abnormal (applies to non-numeric re

sults)            [facility] 

([facility])           

 

           Glucose [Presence] in Urine Negative mg/dL                           

       [facility] ([facility])                                  

 

          Urine Protein Negative  0-0                           [facility] ([facility])  

 

          Urine Nitrite Negative                                [facility] ([facility])  

 

          Urine Ketone Negative mg/dL                               [facility] ([facility]

[facility])  

 

          Urine Urobilinogen 0.2 mg/dL 0.2-1.0                       [facility] ([facility]

[facility])  

 

           Bacteria identified in Urine by Culture Full Report In L <See Note>  

                                [facility]

 ([facility])          

 

                                        FULL REPORT IN LAB NOTES ([facility] and [facility]

).

NO GROWTH





 

 

           Bilirubin.total [Mass/volume] in Serum or Plasma Negative            

                        [facility] 

([facility])           







                                        Procedure

 

                                          



                                                                                
                                                                                
                                                                                
                                                                                
                                                                                
                                                



Social History

          



           Code       Duration   Value      Status     Description Data Source(s

)

 

             Smoking      2020 12:00:00 AM EST Patient is a former smoker 

completed    Patient 

is a former smoker                      [facility] ([facility])



                                                                                
                  



Vital Signs

          



                    ID                  Date                Data Source

 

                    UNK                                      









           Name       Value      Range      Interpretation Code Description Data

 Source(s)

 

           Diastolic blood pressure--sitting 58 mm[Hg]                        58

 [facility]Hg]  [facility]

 

                                        large cuff, Ra 

 

           Systolic blood pressure--sitting 118 mm[Hg]                       118

 mm[Hg] [facility]

 

                                        large cuff, Ra 

 

           Heart rate 53 /min                          53 /min    [facility] ([facility]

[facility])

 

           Body mass index (BMI) [Ratio] 24.9 kg/m2                       24.9 k

g/m2 [facility] ([facility]

 

           Body height 66 [in_i]                        66 [in_i]  [facility]

 

                                        5'6" 

 

           Body weight 154.00 [lb_av]                       154.00 [lb_av] [facility]

T ([facility])

 

           Oxygen saturation in Arterial blood by Pulse oximetry 94 %           

                  94 %       [facility] 

([facility])

 

           Body weight 163.00 [lb_av]                       163.00 [lb_av] [facility]

T ([facility])

 

           Heart rate 82 /min                          82 /min    [facility]

 

           Body mass index (BMI) [Ratio] 24.1 kg/m2                       24.1 k

g/m2 [facility]

 

           Oxygen saturation in Arterial blood by Pulse oximetry 95 %           

                  95 %       [facility] 

([facility])

 

           Body weight 147.00 [lb_av]                       147.00 [lb_av] [facility]

T ([facility])

 

           Body height 65.5 [in_i]                       65.5 [in_i] [facility] ([facility])

 

                                        5'5.50" 

 

           Respiratory rate 14 /min                          14 /min    [facility]

[facility])

 

           Body temperature 98.4 [degF]                       98.4 [degF] [facility]

 ([facility])

 

           Heart rate 56 /min                          56 /min    [facility]

[facility]

 

           Diastolic blood pressure 50 mm[Hg]                        50 mm[Hg]  

[facility]

 

           Systolic blood pressure 130 mm[Hg]                       130 mm[Hg] M

[facility] ([facility])

 

           Body weight 148.00 [lb_av]                       148.00 [lb_av] [facility]

T ([facility])

 

           Body mass index (BMI) [Ratio] 24.8 kg/m2                       24.8 k

g/m2 [facility] ([facility]

 

           Oxygen saturation in Arterial blood by Pulse oximetry 97 %           

                  97 %       [facility] 

([facility])

 

           Body weight 151.50 [lb_av]                       151.50 [lb_av] [facility]

T ([facility])

 

           Body height 65.50 [in_i]                       65.50 [in_i] [facility] ([facility]

[facility])

 

                                        5'5.50" 

 

           Heart rate 42 /min                          42 /min    [facility] ([facility])

 

           Diastolic blood pressure 70 mm[Hg]                        70 mm[Hg]  

[facility]

 

           Systolic blood pressure 130 mm[Hg]                       130 mm[Hg] [facility] ([facility])

 

           Body mass index (BMI) [Ratio] 25.8 kg/m2                       25.8 k

g/m2 [facility]

 

           Oxygen saturation in Arterial blood by Pulse oximetry 95 %           

                  95 %       [facility] 

([facility])

 

                                        [employer] 

 

           Body weight 157.50 [lb_av]                       157.50 [lb_av] [facility]

T ([facility])

 

           Body height 65.50 [in_i]                       65.50 [in_i] [facility] ([facility]

[facility])

 

                                        5'5.50" 

 

           Heart rate 57 /min                          57 /min    [facility] ([facility])

 

           Diastolic blood pressure 70 mm[Hg]                        70 mm[Hg]  

[facility] ([facility])

 

           Systolic blood pressure 132 mm[Hg]                       132 mm[Hg] M

[facility]

 

           Diastolic blood pressure--sitting 68 mm[Hg]                        68

 mm[Hg]  [facility] ([facility]

 

           Systolic blood pressure--sitting 122 mm[Hg]                       122

 mm[Hg] [facility] ([facility])

 

           Heart rate 51 /min                          51 /min    [facility] ([facility]

[facility])

 

           Body mass index (BMI) [Ratio] 26.0 kg/m2                       26.0 k

g/m2 [facility] ([facility])

 

           Body height 66 [in_i]                        66 [in_i]  [facility] ([facility]

[facility])

 

                                        5'6" 

 

           Body weight 161.00 [lb_av]                       161.00 [lb_av] [facility]

T ([facility])

 

           Body weight 72.633 kg                        72.633 kg  [facility] ([facility])

 

           Body mass index (BMI) [Ratio] 27.5 kg/m2                       27.5 k

g/m2 [facility]

 

           Body weight 160.12 [lb_av]                       160.12 [lb_av] [facility]

T ([facility])

 

           Body height 64 [in_i]                        64 [in_i]  [facility] ([facility])

 

                                        5'4" 

 

           Oxygen saturation in Arterial blood by Pulse oximetry 97 %           

                  97 %       [facility] 

([facility])

 

           Heart rate 53 /min                          53 /min    [facility] ([facility])

 

           Diastolic blood pressure 68 mm[Hg]                        68 mm[Hg]  

[facility] ([facility])

 

           Systolic blood pressure 126 mm[Hg]                       126 mm[Hg] [facility] ([facility])

 

           Body mass index (BMI) [Ratio] 26.4 kg/m2                       26.4 k

g/m2 [facility]

 

           Oxygen saturation in Arterial blood by Pulse oximetry 95 %           

                  95 %       [facility] 

([facility])

 

           Body weight 161.00 [lb_av]                       161.00 [lb_av] [facility]

T ([facility])

 

           Body height 65.50 [in_i]                       65.50 [in_i] [facility]

 

                                        5'5.50" 

 

           Heart rate 60 /min                          60 /min    [facility]

 

           Diastolic blood pressure 50 mm[Hg]                        50 mm[Hg]  

[facility] ([facility])

 

           Systolic blood pressure 128 mm[Hg]                       128 mm[Hg] [facility] ([facility])



                                                                                
                  



Patient Treatment Plan of Care

          



             Planned Activity Planned Date Details      Description  Data Source

(s)

 

             Klor-Con M10 10 MEQ 2020 12:00:00 AM [facility]                     

      [facility]

 

             Ipratropium-Albuterol 0.5-2.5 (3) MG/3ML 2020 12:00:00 AM EST

                           [facility]

 ([facility])

 

             Biaxin 500 MG 2020 12:00:00 AM EST                           

[facility] ([facility])

 

             Xopenex 0.63 MG/3ML 2020 12:00:00 AM EST                     

      [facility] ([facility])

 

             Spironolactone 25 MG 2020 12:00:00 AM EST                    

       [facility] ([facility])

 

             Multivitamin 2020 12:00:00 AM EST                           N

[facility] ([facility])

 

             Simvastatin 40 MG 2020 12:00:00 AM EST                       

    [facility] ([facility])

 

             Coumadin 3 MG 2020 12:00:00 AM EST                           

[facility] ([facility])

 

             Albuterol Sulfate (2.5 MG/3ML) 0.083% 2020 12:00:00 AM EST   

                        [facility] 

([facility])

 

             Tylenol      2020 12:00:00 AM EST                           N

[facility] ([facility])

 

             Torsemide 100 MG 2020 12:00:00 AM EST                        

   [facility] ([facility])

 

             Calcium      2020 12:00:00 AM EST                           N

[facility] ([facility])

 

             Aricept 5 MG 2020 12:00:00 AM EST                           N

[facility] ([facility])

 

             Levothyroxine 2020 12:00:00 AM EST                           

[facility] ([facility])

 

             Incruse Ellipta 62.5 MCG/INH 2020 12:00:00 AM EST            

               [facility] ([facility])

 

             Vitamin D    2020 12:00:00 AM EST                           N

[facility] ([facility])

 

             Advair Diskus 100-50 MCG/DOSE 2020 12:00:00 AM EST           

                [facility] ([facility])

 

             Colace       2020 12:00:00 AM EST                           N

[facility] ([facility])

## 2021-01-22 NOTE — CCD
Continuity of Care Document (CCD)

                             Created on: 2020



Xena Basilio

External Reference #: [id number]

: 1935

Sex: Female



Demographics





                          Address                   [address]

[place]  [address]

 

                          Home Phone                [phone]

 

                          Preferred Language        Unknown

 

                          Marital Status            

 

                          Judaism Affiliation     Unknown

 

                          Race                      White

 

                          Ethnic Group              Not  or 





Author





                          Organization              Unknown

 

                          Address                   Unknown

 

                          Phone                     Unavailable







Care Team Providers





                    Care Team Member Name Role                Phone

 

                    Haris Telles JR, MD AUTM                Unavailable

 

                    Tiny Arnett  AUTM                [phone]

 

                    Kandy Rosas MD    AUTM                [phone]

 

                    Ronan Romero MD AUTM                [phone]

 

                    Екатерина Ward   AUTM                [phone]

 

                    [facility]  AUTM                [phone]







Problems





                    Active Problems     Provider            Date

 

                    Anticoagulants Long Term (Current) Use                     O

nset: 1997

 

                    Atrial fibrillation                     Onset: 1997

 

                    Contact dermatitis  CLARISSA Bajwa    Onset: 2011

 

                    Heart valve replacement CLARISSA Bajwa    Onset: 

3

 

                    Mitral valve disorder CLARISSA Bajwa    Onset: 2013

 

                    Essential hypertension JOON Robin Onset: 

3

 

                    Anticoagulant agent CLARISSA Bajwa    Onset: 07/15/2015

 

                    Chronic atrial fibrillation Haris Telles MD Onset: 

 

                    Hypothyroidism      Haris Telles MD Onset: 10/04/2017







Social History





                Type            Date            Description     Comments

 

                Birth Sex                       Unknown          

 

                Tobacco Use     Start: Unknown End: Unknown Patient is a former 

smoker  

 

                Exercise Type/Frequency                 Exercises rarely  







Allergies, Adverse Reactions, Alerts





             Active Allergies Reaction     Severity     Comments     Date

 

             Codeine,PCN                                         2010

 

             Thorazine                                           2016







Medications





           Active Medications SIG        Qnty       Indications Ordering Provide

r Date

 

                          Xopenex                     0.63mg/3ML Nebulizer      

             use four 

times daily prn for wheezing and shortness of breath. DX J44.9 36ml             

                       CLARISSA Bajwa                               10/26/2020

 

                                        [facility] Probiotic   

                   Capsules             

                one twice daily while on antibiotics or if having diarrhea 30cap

s                          Екатерина Nguyen [facility]                               10/23/2020

 

                                        Vitamin D3 Ultra Strength               

      125mcg (5000 Ut) Capsules         

             1 by mouth every day 90caps                    Екатерина Nguyen [facility] 

 

                          Nebulizer Kit/Tubing/Mouthpiece                      K

it                   for 

use with nebulizer--use up to four times a day dx j44.9 1units                  

                Екатерина Nguyen 

[facility]                                     2019

 

                          Incruse Ellipta                     62.5mcg/Inh Aeroso

l                   inhale

one puff by mouth daily 3units                          Екатерина Nguyen [facility] 01/15/2

019

 

                          Levothyroxine Sodium                     25mcg Tablets

                   1 

tablet by mouth every day 90tabs                          Екатерина Nguyen [facility] 10/04

/2017

 

                          Aricept                     5mg Tablets               

    1 by mouth every day 

at bedtime      90tabs          G30.1           Sravan Franks M.D. 10/03/2017

 

                          Advair Diskus                     100-50mcg/Dose Aeros

ol                   

inhale one puff by mouth twice a day 180units                        Екатерина Nguyen [facility] 2017

 

                          Colace                     100mg Capsules             

      1 by mouth twice a 

day             180caps         K59.00          KEATON Akins JR 

017

 

                          Calcium 500+D                     500-200mg-Unit Table

ts                   1 by 

mouth three times a day 270tabs                         Haris Telles MD 

 

                          Duoneb                     0.5-2.5(3)mg/3ML Solution  

                 inhale 

the contents of one vial via nebulizer four times a day as needed for wheezing 
or shortness of breath 270ml           R06.02          Екатерина Nguyen [facility] 05/15/20

17

 

                          Torsemide                     100mg Tablets           

        1tablet by mouth 

daily           30tabs          R60.0           Екатерина Nguyen [facility] 05/15/2017

 

                          Tylenol                     325mg Capsules            

       2 by mouth every 

day as needed   180caps                         Екатерина Nguyen [facility] 2017

 

                                        Albuterol Sulfate                     (2

.5mg/3ML) 0.083% Nebulizer              

             q.i.d. prn via nebulizer dx J44.9 75ml                      DIOGO Bajwa 2016

 

                          Coumadin                     3mg Tablets              

     take 1 tablet by 

mouth once a day as directed 90tabs                          Екатерина Nguyen [facility] 

 

                          Simvastatin                     40mg Tablets          

         take one tablet 

by mouth at bedtime 90tabs                          Екатерина Nguyen [facility] 2015

 

                    Multi-Vitamins                      Tablets                 

  1 by mouth daily    

90tabs                                  Екатерина Nguyen, [facility]    2003

 

                          Spironolactone                     25mg Tablets       

            1 by mouth 

every day                                       Unknown         

 

                          Klor-Con M10                     10Meq Tablets ER     

              2 by mouth 

every day                                       Unknown         

 

                          Losartan Potassium                     25mg Tablets   

                1 by mouth

every day                                       Unknown         

 

                          Ciprofloxacin HCL                     500mg Tablets   

                1 tab by 

mouth twice daily                                 Unknown         

 

                          Metronidazole                     500mg Tablets       

            one by mouth 

three times a day for 10 days                                 Unknown         







Medications Administered in Office





           Medication SIG        Qnty       Indications Ordering Provider Date

 

                          Administration Of Flu Vaccine                      Inj

ection                    

                                                Екатерина Nguyen, [facility] 2019

 

                                        Immunization Adminstration,1 Vaccine/Tox

oid                      Injection      

                                                    Екатерина Nguyen, [facility] 2019

 

                          Administration Of Flu Vaccine                      Inj

ection                    

                                                Екатерина Nguyen, [facility] 10/19/2018

 

                          Administration Of Flu Vaccine                      Inj

ection                    

                                                Kelsea Adams [facility] 10/03/2017

 

                          Administration Of Flu Vaccine                      Inj

ection                    

                                                Kelsea Adams, [facility] 10/13/2016

 

                          Administration Of Flu Vaccine                      Inj

ection                    

                                                Haris Telles MD 10/15/2015

 

                          Administration Of Flu Vaccine                      Inj

nonaion                    

                                                Haris Telles MD 10/23/2014

 

                          Administration Of Flu Vaccine                      Inj

nonaion                    

                                                Haris Telles MD 10/12/2012

 

                          Administration Of Flu Vaccine                      Inj

nonaion                    

                                                Haris Telles MD 10/11/2011

 

                          Administration Of Flu Vaccine                      Inj

bruce Telles MD 10/07/2010

 

                          Administration Of Flu Vaccine                      Inj

bruce Telles MD 10/08/2009

 

                          Administration Of Flu Vaccine                      Inj

nonaion                    

                                                Haris Telles MD 2008

 

                          Administration Of Flu Vaccine                      Inj

nonaion                    

                                                Haris Telles MD 10/18/2007

 

                Administration Of Zostavax                      Injection       

                                            

                          Haris Telles MD     2007

 

                          Administration Of Flu Vaccine                      Inj

ection                    

                                                JOON Robin 2006

 

                          Administration Of Flu Vaccine                      Inj

ection                    

                                                Haris Telles MD 10/04/2005

 

                          Administration Of Flu Vaccine                      Inj

ection                    

                                                DIOGO Bajwa 10/19/2004

 

                          Administration Of Flu Vaccine                      Inj

nonaion                    

                                                Haris Telles MD 2003

 

                          Administration Of Flu Vaccine                      Inj

CLARISSA Irby 10/08/2002







Immunizations





             CPT Code     Status       Date         Vaccine      Lot #

 

             U-Flu        Given        10/05/2020   Influenza,Unspecified  

 

             99616        Given        2020   PPD          [id number]

 

                02655           Given           2019      Influenza Vaccin

e Quadrivalent Preser/Antibiotic Free Im 

Use                                     [id number]

 

             58164        Given        2019   Adacel- Tetanus Diphtheria P

ertussis (Age65 & Under) 

[id number]

 

             19477        Given        2019   Shingrix      

 

             26906        Given        2018   Shingrix      

 

                57548           Given           10/19/2018      Influenza Virus 

Vaccine, Quadrivalent (Cciiv4), Derived 

From Cell                               [id number]

 

                74135           Given           10/03/2017      Influenza Vaccin

e Quadrivalent Preser/Antibiotic Free Im 

Use                                     [id number]

 

                     Given        10/13/2016   Fluvirin Virus Vaccine 71496

01

 

                     Given        10/15/2015   Fluvirin Virus Vaccine 95011

01

 

             14671        Given        2015   Prevnar 13   [id number]

 

                     Given        10/23/2014   Fluvirin Virus Vaccine 20211

21

 

                     Given        10/12/2012   Fluvirin Virus Vaccine 45513

01

 

                     Given        10/11/2011   Fluvirin Virus Vaccine  

 

                     Given        10/11/2011   Fluvirin Virus Vaccine  

 

             69951        Given        10/07/2010   Influenza Virus Vaccine  

 

             04239        Given        2009   Pneumovax 23  

 

             93836        Given        10/08/2009   Influenza Virus Vaccine  

 

             01315        Given        2008   Influenza Virus Vaccine  

 

             51766        Given        10/18/2007   Influenza Virus Vaccine  

 

             16105        Given        2007   Zoster Vaccine  

 

             14390        Given        2006   Influenza Virus Vaccine  

 

             31288        Given        10/04/2005   Influenza Virus Vaccine  

 

             81195        Given        10/19/2004   Influenza Virus Vaccine  

 

             72333        Given        2003   Influenza Virus Vaccine  

 

             99036        Given        10/08/2002   Influenza Virus Vaccine  

 

             97155        Given        2001   Influenza Virus Vaccine  

 

             31542        Given        2001   Tetanus Toxoid  

 

             U-Pneum      Given        10/01/2000   Pneumococcal,Unspecified  

 

             98533        Given        10/21/1999   Influenza Virus Vaccine  

 

             41580        Given        10/25/1994   Influenza Virus Vaccine  







Vital Signs





                Date            Vital           Result          Comment

 

                10/29/2020 11:21am Heart Rate      82 /min          

 

                    Weight              163.00 lb            

 

                    O2 % BldC Oximetry  94 %                 

 

                09/10/2020  1:40pm BP Systolic     130 mmHg         

 

                    BP Diastolic        50 mmHg              

 

                    Heart Rate          56 /min              

 

                    Body Temperature    98.4 F             

 

                    Respiratory Rate    14 /min              

 

                    Height              65.5 inches         5'5.50"

 

                    Weight              147.00 lb            

 

                    O2 % BldC Oximetry  95 %                 

 

                    BMI (Body Mass Index) 24.1 kg/m2           







Results





        Test    Acquired Date Facility Test    Result  H/L     Range   Note

 

                    Laboratory test finding 2020          [facility]

                                        [address] [place] [address] [phone] iSTAT Troponin 0.01 NG/ML Normal     0.00-0.08   

 

                    Istat Chem8+ Panel  2020          [facility]

nter

                                        [address] [place] [address] [phone] iSTAT HCT  40.0 %     Normal     38.0-51.0   

 

             iSTAT Glucose 89 mg/dL     Normal               

 

             iSTAT Sodium 138 mEq/L    Normal       136-145       

 

             iSTAT Potassium 3.7 mEq/L    Normal       3.5-5.1       

 

             iSTAT CA++   4.6 mg/dL    Normal       4.5-5.3       

 

             iSTAT Chloride 98 mEq/L     Normal               

 

             iSTAT Co2    32.0 MM/L    High         23.0-27.0     

 

             iSTAT BUN    26 mg/dL     Normal       8-26          

 

             iSTAT Creatinine 1.1 mg/dL    Normal       0.6-1.3       

 

                    Laboratory test finding 2020          [address] [address] [phone] Bedside Glucose 83 mg/dL   Normal           

 

                    CBC With Differential 2020          [address] [address] [phone] White Blood Count 8.5 10     Normal     4.0-10.0    

 

             Red Blood Count 4.35 10      Normal       4.00-5.40     

 

             Hemoglobin   13.0 g/dL    Normal       12.0-15.5     

 

             Hematocrit   40.6 %       Normal       36.0-47.0     

 

             Mean Corpuscular Volume 93.3 fl      Normal       80.0-96.0     

 

             Mean Corpuscular Hemoglobin 29.9 pg      Normal       27.0-33.0    

 

 

             Mean Corpuscular HGB Conc 32.0 g/dL    Normal       32.0-36.5     

 

             Red Cell Distribution Width 15.2 %       High         11.5-14.5    

 

 

             Platelet Count, Automated 194 10       Normal       150-450       

 

             Neutrophils % 65.5 %       Normal       36.0-66.0     

 

             Lymph %      12.8 %       Low          24.0-44.0     

 

             Mono %       18.0 %       High         0.0-5.0       

 

             Eos %        2.2 %        Normal       0.0-3.0       

 

             Baso %       1.1 %        High         0.0-1.0       

 

             Immature Granulocyte % 0.4 %        Normal       0-3.0         

 

             Nucleated Red Blood Cell % 0.0 %        Normal       0-0           

 

             Neutrophils # 5.5 10       Normal       1.5-8.5       

 

             Lymph #      1.1 10       Low          1.5-5.0       

 

             Mono #       1.5 10       High         0.0-0.8       

 

             Eos #        0.2 10       Normal       0.0-0.5       

 

             Baso #       0.1 10       Normal       0.0-0.2       

 

                    Laboratory test finding 2020          [facility]

                                        [address] [place] [address] [phone] Thyroid Stimulating Hormone 5.120 uIU/ML High       0.

358-3.740  

 

                    Prothrombin Time/Inr 2020          [facility]

enter

                                        [address] [place] [address] [phone] Prothrombin Time 26.5 seconds High       12.5-14.3   

 

             Inr          2.38         Normal                    1

 

                    Complete Blood Count 2020          [facility]

enter

                                        [address] [place] [address] [phone] White Blood Count 7.8 10     Normal     4.0-10.0    

 

             Red Blood Count 4.46 10      Normal       4.00-5.40     

 

             Hemoglobin   13.3 g/dL    Normal       12.0-15.5     

 

             Hematocrit   41.6 %       Normal       36.0-47.0     

 

             Mean Corpuscular Volume 93.3 fl      Normal       80.0-96.0     

 

             Mean Corpuscular Hemoglobin 29.8 pg      Normal       27.0-33.0    

 

 

             Mean Corpuscular HGB Conc 32.0 g/dL    Normal       32.0-36.5     

 

             Red Cell Distribution Width 15.9 %       High         11.5-14.5    

 

 

             Platelet Count, Automated 231 10       Normal       150-450       

 

             Nucleated Red Blood Cell % 0.0 %        Normal       0-0           

 

                    Comprehensive Metabolic Profil 2020          [facility]

                                        [address] [place] [address] [phone] Glucose, Fasting 99 mg/dL   Normal           

 

             Blood Urea Nitrogen 21 mg/dL     High         7-18          

 

             Creatinine For GFR 1.07 mg/dL   Normal       0.55-1.30     

 

             Glomerular Filtration Rate 52.1         Normal       >32          2

 

             Sodium Level 136 mEq/L    Normal       136-145       

 

             Potassium Serum 4.2 mEq/L    Normal       3.5-5.1       

 

             Chloride Level 95 mEq/L     Low                  

 

             Carbon Dioxide Level 35 mEq/L     High         21-32         

 

             Anion Gap    6 mEq/L      Low          8-16          

 

             Calcium Level 10.1 mg/dL   Normal       8.8-10.2      

 

             Ast/Sgot     32 U/L       Normal       7-37          

 

             Alt/SGPT     23 U/L       Normal       12-78         

 

             Alkaline Phosphatase 66 U/L       Normal               

 

             Bilirubin,Total 0.7 mg/dL    Normal       0.2-1.0       

 

             Total Protein 6.6 GM/DL    Normal       6.4-8.2       

 

             Albumin      3.7 GM/DL    Normal       3.2-5.2       

 

             Albumin/Globulin Ratio 1.3          Normal       1.2-2.2       

 

                    Type & Screen -Incl Blood Type,Walker,AB SC 10/20/2020         

 [address] [address] [phone] Blood Type A POSITIVE  Normal                 

 

             AB Screen (Indirect Anatoly)Vis NEGATIVE     Normal                 

    

 

                    Laboratory test finding 10/20/2020          [facility]

                                        [address] [place] [address] [phone] Lipase     157 U/L    Normal           

 

             Thyroid Stimulating Hormone 3.480 uIU/ML Normal       0.358-3.740  

 

 

                    Liver Profile       10/20/2020          [facility]

nter

                                        [address] [place] [address] [phone] Ast/Sgot   23 U/L     Normal     7-37        

 

             Alt/SGPT     24 U/L       Normal       12-78         

 

             Alkaline Phosphatase 72 U/L       Normal               

 

             Bilirubin,Total 1.2 mg/dL    High         0.2-1.0       

 

             Bilirubin,Direct 0.4 mg/dL    High         0.0-0.2       

 

             Total Protein 6.7 GM/DL    Normal       6.4-8.2       

 

             Albumin      3.7 GM/DL    Normal       3.2-5.2       

 

             Albumin/Globulin Ratio 1.2          Normal       1.2-2.2       

 

                    Cardiac Marker Panel 10/20/2020          [facility]

enter

                                        [address] [place] [address] [phone] CPK Creatine Phosphokinase 62 U/L     Normal     26-19

2      

 

             CK-MB Value Mass < 1.0 NG/ML  Normal       <3.6          

 

             MB/CK Relative Index 1.61         Normal       < Or =4      3

 

             Troponin I   < 0.02 NG/ML Normal       < 0.10       4

 

                    Ua W/ Reflex To Culture 10/20/2020          [address] [address] [phone] Appearance, Urine RFX CLEAR      Normal     Clear     

  

 

             Color, Urine RFX YELLOW       Normal       Yellow        

 

             PH,Urine RFX 6.0 units    Normal       5.0-9.0       

 

             Specific Gravity Ur Auto RFX 1.012        Normal       1.002-1.035 

  

 

             Protein, Urine Auto RFX NEGATIVE mg/dL Normal       Negative      

 

             Glucose, Urine (Ua) Auto RFX NEGATIVE mg/dL Normal       Negative  

    

 

             Ketone, Urine Auto RFX NEGATIVE mg/dL Normal       Negative      

 

             Urobilinogen, Urine Auto RFX 0.2 mg/dL    Normal       0.0-2.0     

  

 

             Bilirubin, Urine Auto RFX NEGATIVE     Normal       Negative      

 

             Nitrite, Urine Auto RFX NEGATIVE     Normal       Negative      

 

             Leukocyte Esterase Ur Auto RFX NEGATIVE     Normal       Negative  

    

 

             Blood, Urine Blood RFX NEGATIVE     Normal       Negative      

 

             WBC, Urine Auto RFX 1 /HPF       Normal       0-3           

 

             RBC, Urine Auto RFX 1 /HPF       Normal       0-3           

 

             Bacteria, Urine Auto RFX NEGATIVE     Normal       Negative      

 

             Squam Epithelial Cell Ur Aurfx 0 /HPF       Normal       0-6       

    

 

             Hyaline Cast, Urine Auto RFX 0 /LPF       Normal       0-1         

  

 

                    Laboratory test finding 10/20/2020          [id number] [place] [address] [phone] Ammonia    < 10 uMOL/L Normal     <32         

 

                    CBC With Differential 10/20/2020          [address] [address] [phone] White Blood Count 14.2 10    High       4.0-10.0    

 

             Red Blood Count 4.35 10      Normal       4.00-5.40     

 

             Hemoglobin   13.1 g/dL    Normal       12.0-15.5     

 

             Hematocrit   40.2 %       Normal       36.0-47.0     

 

             Mean Corpuscular Volume 92.4 fl      Normal       80.0-96.0     

 

             Mean Corpuscular Hemoglobin 30.1 pg      Normal       27.0-33.0    

 

 

             Mean Corpuscular HGB Conc 32.6 g/dL    Normal       32.0-36.5     

 

             Red Cell Distribution Width 15.3 %       High         11.5-14.5    

 

 

             Platelet Count, Automated 178 10       Normal       150-450       

 

             Neutrophils % 75.0 %       High         36.0-66.0     

 

             Lymph %      5.6 %        Low          24.0-44.0     

 

             Mono %       17.9 %       High         0.0-5.0       

 

             Eos %        0.5 %        Normal       0.0-3.0       

 

             Baso %       0.4 %        Normal       0.0-1.0       

 

             Immature Granulocyte % 0.6 %        Normal       0-3.0         

 

             Nucleated Red Blood Cell % 0.0 %        Normal       0-0           

 

             Neutrophils # 10.7 10      High         1.5-8.5       

 

             Lymph #      0.8 10       Low          1.5-5.0       

 

             Mono #       2.5 10       High         0.0-0.8       

 

             Eos #        0.1 10       Normal       0.0-0.5       

 

             Baso #       0.1 10       Normal       0.0-0.2       

 

                    Istat PT/Inr        10/20/2020          [address] [place], [place] [id number] [phone] iSTAT Protime Seconds 31.3 seconds High       12.1-14.

4   

 

             iSTAT Inr    2.7          Normal                     

 

                    Istat Chem8+ Panel  10/20/2020          [facility]

nter

                                        [address] [place], [place] [id number] [phone] iSTAT HCT  41.0 %     Normal     38.0-51.0   

 

             iSTAT Glucose 107 mg/dL    High                 

 

             iSTAT Sodium 136 mEq/L    Normal       136-145       

 

             iSTAT Potassium 3.9 mEq/L    Normal       3.5-5.1       

 

             iSTAT CA++   5.1 mg/dL    Normal       4.5-5.3       

 

             iSTAT Chloride 95 mEq/L     Low                  

 

             iSTAT Co2    30.0 MM/L    High         23.0-27.0     

 

             iSTAT BUN    30 mg/dL     High         8-26          

 

             iSTAT Creatinine 1.2 mg/dL    Normal       0.6-1.3       

 

                    Laboratory test finding 10/20/2020          [facility]

                                        [address] [place], [place] [id number] [phone] iSTAT Lactate 0.90       Normal     0.4-2.0     

 

                    Laboratory test finding 10/20/2020          [facility]

                                        [address] [place] [address] [phone] iSTAT Troponin 0.02 NG/ML Normal     0.00-0.08   

 

                    Complete Blood Count 09/10/2020          [facility]

[facility], [facility]

: Dr Haris Telles

[place] [address] [phone] WBC        7.4 x10*3/UL            4.1 - 10.9  

 

             RBC          5.02 x10*6/UL              4.20 - 6.30   

 

             Hemoglobin   14.8 g/dL                 12.0 - 18.0   

 

             Hematocrit   44.4 %                    37.0 - 51.0   

 

             MCV          88.5 fL                   80.0 - 97.0   

 

             MCH          29.4 pg                   26.0 - 32.0   

 

             MCHC         33.3 g/dL                 31.0 - 38.0   

 

             RDW          14.3 %       High         11.6 - 13.7   

 

             PLT          208 x10*3/UL              140 - 440     

 

             MPV          8.8 FL                    7.8 - 11.0    

 

             Lymph %      16.7 %                    10.0 - 58.5   

 

             Mid %        4.6 %                     1.7 - 9.3     

 

             Neut %       78.7 %                    37.0 - 92.0   

 

             Lymph #      1.2 x10*3/UL              0.6 - 4.1     

 

             Mid #        0.4 x10*3/UL              0.1 - 0.6     

 

             Neut #       5.8 x10*3/UL              2.0 - 7.8     

 

                    Basic Metabolic Panel 09/10/2020          [facility]

[facility]

: Dr Haris Telles

[place] [address] [phone] Glucose    100 mg/dL  High       74 - 99    5

 

             BUN          24 mg/dL     High         7 - 18        

 

             Creatinine   1.4 mg/dL    High         0.6 - 1.3     

 

             Sodium       139 mEq/L                 136 - 145     

 

             Potassium    4.3 mEq/L                 3.5 - 5.1     

 

             Chloride     99 mEq/L                  98 - 107      

 

             Carbon Dioxide 36 mEq/L     High         21 - 32       

 

             Calcium      9.9 mg/dL                 8.5 - 10.1    

 

             GFR  36 mL/min    Low          >60           

 

             GFR African American 44 mL/min    Low          >60          6

 

                    Laboratory test finding 09/10/2020          [facility]

: Dr Haris Telles

[place] [address] [phone] Thyroid Stimulating Hormone 3.19 uIU/mL            0.3

6 - 3.74  

 

                    Laboratory test finding 2020          [facility]

             Inr          3.1                                     

 

                    Laboratory test finding 2020          [facility]

             Inr          2.5                                     

 

                    Complete Blood Count 06/10/2020          [facility]

[facility], [facility]

: Dr Haris Telles

[place] [address] [phone] WBC        8.1 x10*3/UL            4.1 - 10.9  

 

             RBC          5.07 x10*6/UL              4.20 - 6.30   

 

             Hemoglobin   14.8 g/dL                 12.0 - 18.0   

 

             Hematocrit   44.9 %                    37.0 - 51.0   

 

             MCV          88.5 fL                   80.0 - 97.0   

 

             MCH          29.3 pg                   26.0 - 32.0   

 

             MCHC         33.1 g/dL                 31.0 - 38.0   

 

             RDW          14.2 %       High         11.6 - 13.7   

 

             PLT          206 x10*3/UL              140 - 440     

 

             MPV          8.7 FL                    7.8 - 11.0    

 

             Lymph %      16.9 %                    10.0 - 58.5   

 

             Mid %        4.7 %                     1.7 - 9.3     

 

             Neut %       78.4 %                    37.0 - 92.0   

 

             Lymph #      1.3 x10*3/UL              0.6 - 4.1     

 

             Mid #        0.5 x10*3/UL              0.1 - 0.6     

 

             Neut #       6.3 x10*3/UL              2.0 - 7.8     

 

                    Basic Metabolic Panel 06/10/2020          [facility]

[facility]

: Dr Haris Telles

[place] [address] [phone] Glucose    76 mg/dL              74 - 99    7

 

             BUN          22 mg/dL     High         7 - 18        

 

             Creatinine   1.2 mg/dL                 0.6 - 1.3     

 

             Sodium       144 mEq/L                 136 - 145     

 

             Potassium    4.2 mEq/L                 3.5 - 5.1     

 

             Chloride     104 mEq/L                 98 - 107      

 

             Carbon Dioxide 33 mEq/L     High         21 - 32       

 

             Calcium      9.7 mg/dL                 8.5 - 10.1    

 

             GFR  43 mL/min    Low          >60           

 

             GFR African American 52 mL/min    Low          >60          8

 

                    Laboratory test finding 06/10/2020          [place] [facility]

: Dr Haris Telles

[place] [address] [phone] Magnesium  2.1 mg/dL             1.8 - 2.4   







                          1                         THERAPUTIC HUMAN INR VALUES

INDICATIONS                      NORMAL RANGES

PROPHYLAXIS/TREATMENT OF:

VENOUS THROMBOSIS                2.0-3.0

PULMONARY EMBOLISM               2.0-3.0

PREVENTION OF SYSTEMIC EMBOLISM FROM:

TISSUE HEART VALVES              2.0-3.0

ACUTE MYOCARDIAL INFARCTION      2.0-3.0

VALVULAR HEART DISEASE           2.0-3.0

ATRIAL FIBRILLATION              2.0-3.0

MECHANICAL VALVES(HIGH RISK)     2.5-3.5

RECURRENT MYOCARDIAL INFARCTION  2.5-3.5



 

                          2                         Units are mL/min/1.73 m2



Chronic Kidney Disease Staging per NKF:



Stage I & II   GFR >=60       Normal to Mildly Decreased

Stage III      GFR 30-59      Moderately Decreased

Stage IV       GFR 15-29      Severely Decreased

Stage V        GFR <15        Very Little GFR Left

ESRD           GFR <15 on RRT



 

                          3                         DIAGNOSIS CRITERIA

MMB ng/ml       Relative Index (RI)

NON-AMI               < or = 5               N/A

GRAY ZONE              > 5                < or = 4

AMI                    > 5                   > 4



 

                          4                         Troponin I Reference Interva

l for Siemens Vista LOCI:



                                        99th Percentile= 0.00-0.045 ng/ml



Risk Stratification:

<= 0.10 ng/ml   Decreased Risk for Adverse Clinical

Events.

                                        0.10-1.50 ng/ml   Increased Risk for Adv

erse Clinical

Events. Evaluation of additional

criterion and/or repeat testing in 2-6

hours is suggested to rule out myocardial

damage.

>= 1.50 ng/ml   Indicative of Myocardial Injury.



 

                          5                         100-125 mg/dL     PRE-DIABET

ES/FASTING

>126 mg/dL          DIABETES/FASTING



 

                          6                         CHRONIC KIDNEY DISEASE STAGI

NG PER NKF



STAGE I & II      GFR >= 60        NORMAL TO MILDLY DECREASED

STAGE III          GFR 30-59          MODERATELY DECREASED

STAGE IV           GFR 15-29         SEVERELY DECREASED

STAGE V            GFR <15            VERY LITTLE GFR LEFT

ESRD                 GFR <15            ON RRT



 

                          7                         100-125 mg/dL     PRE-DIABET

ES/FASTING

>126 mg/dL          DIABETES/FASTING



 

                          8                         CHRONIC KIDNEY DISEASE STAGI

NG PER NKF



STAGE I & II      GFR >= 60        NORMAL TO MILDLY DECREASED

STAGE III          GFR 30-59          MODERATELY DECREASED

STAGE IV           GFR 15-29         SEVERELY DECREASED

STAGE V            GFR <15            VERY LITTLE GFR LEFT

ESRD                 GFR <15            ON RRT









Procedures





                Date            Code            Description     Status

 

                2018      [id number]       Diabetic Foot Exam Completed

 

                2017      [id number]        Mammogram       Completed

 

                01/15/2016      [id number]        Mammogram       Completed

 

                2015      [id number]        Mammogram       Completed

 

                2014      [id number]        Mammogram       Completed

 

                2013      [id number]       Diabetic Retinal Eye Exam Comple

zeke

 

                2013      [id number]       Bone Mineral Density Test Comple

zeke

 

                01/10/2013      [id number]        Mammogram       Completed

 

                2012      [id number]        Mammogram       Completed

 

                06/10/2011      [id number]       Bone Mineral Density Test Comple

zeke

 

                2011      [id number]        Mammogram       Completed

 

                2009      [id number]        Mammogram       Completed

 

                2008      [id number]       Bone Mineral Density Test Comple

zeke

 

                10/22/2004      [id number]        Colonoscopy     Completed







Medical Devices





                                        Description

 

                                        No Information Available







Encounters





           Type       Date       Location   Provider   Dx         Diagnosis

 

             Office Visit 10/29/2020 11:20a [facility] Екатерина Lemus

ne, FNP 

K57.92                                  Dvtrcli of intest, part unsp, w/o perf o

r abscess w/o bleed

 

                          I50.41                    Acute combined systolic and 

diastolic (congestive) hrt fail

 

                          I13.0                     Hyp hrt & chr kdny dis w hrt

 fail and stg 1-4/unsp chr kdny

 

                          N18.31                    Chronic kidney disease, stag

e 3a

 

                          J44.9                     Chronic obstructive pulmonar

y disease, unspecified

 

                          I48.21                    Permanent atrial fibrillatio

n

 

                          Z95.2                     Presence of prosthetic heart

 valve

 

                          Z79.01                    Long term (current) use of a

nticoagulants

 

                          R26.89                    Other abnormalities of gait 

and mobility

 

             Office Visit 09/10/2020  1:40p [facility] Екатерина Lemus

ne, FNP I13.0

                                        Hyp hrt & chr kdny dis w hrt fail and st

g 1-4/unsp chr kdny

 

                          I50.42                    Chronic combined systolic an

d diastolic hrt fail

 

                          N18.3                     Chronic kidney disease, stag

e 3 (moderate)

 

                          J44.9                     Chronic obstructive pulmonar

y disease, unspecified

 

                          G30.1                     Alzheimer's disease with lat

e onset

 

                          F02.80                    Dementia in oth diseases [facility] elswhr w/o behavrl disturb

 

                          I48.21                    Permanent atrial fibrillatio

n

 

                          Z79.01                    Long term (current) use of a

nticoagulants

 

                          Z95.2                     Presence of prosthetic heart

 valve

 

                          I87.2                     Venous insufficiency (chroni

c) (peripheral)

 

             Office Visit 06/10/2020  2:00p [facility] Екатерина Menendez, [facility] I13.0

                                        Hyp hrt & chr kdny dis w hrt fail and st

g 1-4/unsp chr kdny

 

                          I50.42                    Chronic combined systolic an

d diastolic hrt fail

 

                          N18.3                     Chronic kidney disease, stag

e 3 (moderate)

 

                          J44.9                     Chronic obstructive pulmonar

y disease, unspecified

 

                          G30.1                     Alzheimer's disease with lat

e onset

 

                          F02.80                    Dementia in oth diseases [facility] elswhr w/o behavrl disturb

 

                          I48.21                    Permanent atrial fibrillatio

n

 

                          Z79.01                    Long term (current) use of a

nticoagulants

 

                          Z95.2                     Presence of prosthetic heart

 valve

 

                          Z13.89                    Encounter for screening for 

other disorder







Assessments





                Date            Code            Description     Provider

 

                    10/29/2020          K57.92              Diverticulitis of in

testine, part unspecified, without 

perforation or abscess without bleeding Екатерина Nguyen [facility]

 

                    10/29/2020          I50.41              Acute combined systo

lic (congestive) and diastolic 

(congestive) heart failure              CLARISSA Bajwa

 

                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

idney disease with heart lakshmi 

Екатерина Nguyen [facility]

 

                10/29/2020      N18.31          Chronic kidney disease, stage 3a

 DOIGO Bajwa

 

                10/29/2020      J44.9           Chronic obstructive pulmonary di

sease, unspecified Екатерина Nguyen

[facility]

 

                10/29/2020      I48.21          Permanent atrial fibrillation An

n DIOGO Nguyen

 

                10/29/2020      Z95.2           Presence of prosthetic heart zakia

ve Екатерина Nguyen [facility]

 

                10/29/2020      Z79.01          Long term (current) use of antic

oagulants CLARISSA Bajwa

 

                10/29/2020      R26.89          Other abnormalities of gait and 

mobility CLARISSA Bajwa

 

                10/15/2020      J44.9           Chronic obstructive pulmonary di

sease, unspecified Carballo F. 

Elfego,MD

 

                10/15/2020      I48.21          Permanent atrial fibrillation Co

amanda Telles MD

 

                10/15/2020      I10             Essential (primary) hypertension

 Haris Telles MD

 

                10/15/2020      G30.1           Alzheimer's disease with late on

set Haris Telles MD

 

                2020      J44.9           Chronic obstructive pulmonary di

sease, unspecified Haris Telles MD

 

                2020      G30.1           Alzheimer's disease with late on

set Haris Telles MD

 

                2020      I48.21          Permanent atrial fibrillation Co

amanda Telles MD

 

                2020      I10             Essential (primary) hypertension

 Haris Telles MD

 

                09/10/2020      I13.0           Hypertensive heart and chronic k

idney disease with heart lakshmi 

Екатерина Nguyen, [facility]

 

                    09/10/2020          I50.42              Chronic combined sys

tolic (congestive) and diastolic 

(congestive) heart failure              Екатерина Nguyen [facility]

 

                09/10/2020      N18.3           Chronic kidney disease, stage 3 

(moderate) Екатерина Nguyen [facility]

 

                09/10/2020      J44.9           Chronic obstructive pulmonary di

sease, unspecified Екатерина Nguyen

[facility]

 

                09/10/2020      G30.1           Alzheimer's disease with late on

set Екатерина Nguyen [facility]

 

                    09/10/2020          F02.80              Dementia in other di

seases classified elsewhere without 

behavioral disturbance                  Екатерина Nguyen [facility]

 

                09/10/2020      I48.21          Permanent atrial fibrillation An

marino Nguyen [facility]

 

                09/10/2020      Z79.01          Long term (current) use of antic

oagulants Екатерина Nguyen [facility]

 

                09/10/2020      Z95.2           Presence of prosthetic heart zakia

ve Екатерина Nguyen [facility]

 

                09/10/2020      I87.2           Venous insufficiency (chronic) (

peripheral) Екатерина Nguyen [facility]

 

                2020      Z11.1           Encounter for screening for resp

iratory tuberculosis Haris Telles MD

 

                2020      I48.21          Permanent atrial fibrillation An

marino Nguyen [facility]

 

                2020      I48.21          Permanent atrial fibrillation Pr

otime

 

                2020      Z51.81          Encounter for therapeutic drug l

evel monitoring Екатерина Nguyen 

[facility]

 

                2020      Z51.81          Encounter for therapeutic drug l

evel monitoring Protime

 

                2020      Z79.01          Long term (current) use of antic

oagulants кЕатерина Nguyen, [facility]

 

                2020      Z79.01          Long term (current) use of antic

oagulants Protime

 

                2020      I48.21          Permanent atrial fibrillation An

n Radha Goncalves, [facility]

 

                2020      I48.21          Permanent atrial fibrillation Pr

otime

 

                2020      Z51.81          Encounter for therapeutic drug l

evel monitoring Екатерина Radha Sacha, 

[facility]

 

                2020      Z79.01          Long term (current) use of antic

oagulants Екатерина Nguyen, [facility]

 

                2020      I10             Essential (primary) hypertension

 Haris Telles MD

 

                2020      N18.3           Chronic kidney disease, stage 3 

(moderate) Haris Telles MD

 

                2020      J44.9           Chronic obstructive pulmonary di

sease, unspecified Haris Telles MD

 

                2020      G30.1           Alzheimer's disease with late on

set Haris Telles MD

 

                2020      N18.3           Chronic kidney disease, stage 3 

(moderate) Haris Telles MD

 

                2020      J44.9           Chronic obstructive pulmonary di

sease, unspecified Haris Telles MD

 

                2020      G30.1           Alzheimer's disease with late on

set Haris Telles MD

 

                2020      I10             Essential (primary) hypertension

 Haris Telles MD

 

                06/10/2020      I13.0           Hypertensive heart and chronic k

idney disease with heart lakshmi 

Екатерина gNuyen, [facility]

 

                    06/10/2020          I50.42              Chronic combined sys

tolic (congestive) and diastolic 

(congestive) heart failure              Екатерина Nguyen, [facility]

 

                06/10/2020      N18.3           Chronic kidney disease, stage 3 

(moderate) Екатерина Nguyen [facility]

 

                06/10/2020      J44.9           Chronic obstructive pulmonary di

sease, unspecified Екатерина Nguyen

[facility]

 

                06/10/2020      G30.1           Alzheimer's disease with late on

set Екатерина Nguyen, [facility]

 

                    06/10/2020          F02.80              Dementia in other di

seases classified elsewhere without 

behavioral disturbance                  Екатерина Nguyen [facility]

 

                06/10/2020      I48.21          Permanent atrial fibrillation An

n Radha Goncalves, [facility]

 

                06/10/2020      Z79.01          Long term (current) use of antic

oagulants Екатерина Radha Goncalves [facility]

 

                06/10/2020      Z95.2           Presence of prosthetic heart zakia

ve Екатерина Garcia Sacha, [facility]

 

                06/10/2020      Z13.89          Encounter for screening for othe

r disorder CLARISSA Bajwa

 

                2020      N18.3           Chronic kidney disease, stage 3 

(moderate) Haris Telles MD

 

                2020      J44.9           Chronic obstructive pulmonary di

sease, unspecified Haris Telles MD

 

                2020      I10             Essential (primary) hypertension

 Haris Telles MD

 

                2020      E66.3           Overweight      Haris srinivasan MD







Plan of Treatment

Future Appointment(s):* 2020  3:20 pm - CLARISSA Bajwa at [facility]

10/29/2020 - CLARISSA Bajwa* K57.92 Diverticulitis of intestine, part 
  unspecified, without perforation or abscess without bleeding

* I50.41 Acute combined systolic (congestive) and diastolic (congestive) heart 
  failure* New Orders:* CBC and CMP, Scheduled: 20



* Comments:* will increase Torsemide to 100mg daily.will arrange for [facility] to evaluate.





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
  * Blood pressure is controlled on current treatment plan.





* N18.31 Chronic kidney disease, stage 3a

* J44.9 Chronic obstructive pulmonary disease, unspecified* Comments:* will 
  request BMP in one week.





* I48.21 Permanent atrial fibrillation* Comments:* Rate controlled.





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

* Z79.01 Long term (current) use of anticoagulants* Comments:* INR completed at 
  home.  Has been supratherapeutic due to interaction of Warfarin and 
  Metronidazole.  Verbal  instructions given. Signs and symptoms to report re
  viewed.  No evidence of thromboembolic or bleeding events.





* R26.89 Other abnormalities of gait and mobility* New Orders:* Physical 
  Therapy, Ordered: 10/29/20



* Comments:* will request home physical therapy.





* All * Comments:* Will need MARITZA.Tried to discuss [facility] and advance 
  directives.Booklet "hard choices for loving people" given.









Functional Status





                                        Description

 

                                        No Information Available







Mental Status





                                        Description

 

                                        No Information Available







Referrals





                                        Description

 

                                        No Information Available

## 2021-01-22 NOTE — CCD
Continuity of Care Document

                             Created on: 2020



Xena Basilio

External Reference #: [id number]

: 1935

Sex: Female



Demographics





                          Address                   [address] [place] [address]

 

                          Preferred Language        English

 

                          Marital Status            Unknown

 

                          Cheondoism Affiliation     Unknown

 

                          Race                      Unknown

 

                          Ethnic Group              Unknown





Author





                          Author                    [employer]

d

 

                          Organization              Unknown

 

                          Address                   Unknown

 

                          Phone                     Unavailable







Care Team Providers





                    Care Team Member Name Role                Phone

 

                    Haris Telles    Unavailable          [phone]

 

                          Unavailable               Unavailable

 

                    Haris Telles    Unavailable          [phone]

 

                          Unavailable               Unavailable

 

                    Karolina Young       Unavailable          [phone]

 

                    Tracie Almonte Unavailable          [phone]

 

                    Elva Mendel       Unavailable          [phone]



                                                  



Problems

                



                    Name                Dates               Details

 

                                                             Encephalopathy, uns

pecified          (G93.40)

                          20-Oct-2020               Status: Active

 

                                                             Presence of prosthe

tic heart valve          (Z95.2)

                          20-Oct-2020               Status: Active

 

                                                             Presence of cardiac

 pacemaker          (Z95.0)

                          20-Oct-2020               Status: Active

 

                                                             History of falling 

         (Z91.81)

                          20-Oct-2020               Status: Active

 

                                                             Long term (current)

 use of inhaled steroids          

(Z79.51)

                          20-Oct-2020               Status: Active

 

                                                             Long term (current)

 use of anticoagulants          (Z79.01)

                          20-Oct-2020               Status: Active

 

                                                             Encounter for thera

peutic drug level monitoring          

(Z51.81)

                          20-Oct-2020               Status: Active

 

                                                             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

rosis without current pathological 

fracture          (M81.0)

                          20-Oct-2020               Status: Active

 

                                                             Unspecified osteoar

thritis, unspecified site          

(M19.90)

                          20-Oct-2020               Status: Active

 

                                                             Chronic obstructive

 pulmonary disease, unspecified         

(J44.9)

                          20-Oct-2020               Status: Active

 

                                                             Permanent atrial fi

brillation          (I48.21)

                          20-Oct-2020               Status: Active

 

                                                             Dementia in other d

iseases classified elsewhere without 

behavioral disturbance          (F02.80)

                          20-Oct-2020               Status: Active

 

                                                             Alzheimer's disease

, unspecified          (G30.9)

                          20-Oct-2020               Status: Active

 

                                                             Chronic kidney dise

ase, stage 3a          (N18.31)

                          20-Oct-2020               Status: Active

 

                                                             Acute combined syst

olic (congestive) and diastolic 

(congestive) heart failure          (I50.41)

                          20-Oct-2020               Status: Active

 

                                                             Hypertensive heart 

and chronic kidney disease with heart 

failure and stage 1 through stage 4 chronic kidney disease, or unspecified 
chronic kidney disease          (I13.0)

                          20-Oct-2020               Status: Active

 

                                                             Diverticulitis of i

ntestine, part unspecified, without 

perforation or abscess without bleeding          (K57.92)

                          20-Oct-2020               Status: Active



                                                                                
                                                                                
                                                                       



Medications

                



                    Name                Dates               Details

 

                                        Xopenex 0.63 MG/3ML

every 6 hours as needed for wheezing or shortness of breath 

Haris Telles MD 

 







Active

Biaxin 500 MG

1 tab 1 hour prior to dental procedures

Bryan Telles MD 



                                         Start : 2020





Active

Ipratropium-Albuterol 0.5-2.5 (3) MG/3ML

every 6 hours as needed for shortness of breath

Bryan Telles MD 



                                         Start : 2020





Active

Klor-Con M10 10 MEQ



Bryan Telles MD 



                                         Start : 2020





Active

Spironolactone 25 MG



Bryan Telles MD 



                                         Start : 2020





Active

Multivitamin 



Bryan Telles MD 



                                         Start : 2020





Active

Simvastatin 40 MG



Bryan Telles MD 



                                         Start : 2020





Active

Coumadin 3 MG



Bryan Telles MD 



                                         Start : 2020





Active

Albuterol Sulfate (2.5 MG/3ML) 0.083%

via nebulizer for SOB, no more than 4x/day

Bryan Telles MD 



                                         Start : 2020





Active

Tylenol 

2 by mouth everyday as needed for pain >5, no more than 3,000 mg/day.

Bryan Telles MD 



                                         Start : 2020





Active

Torsemide 100 MG



Bryan Telles MD 



                                         Start : 2020





Active

Calcium 



Bryan Telles MD 



                                         Start : 2020





Active

Colace 



Bryan Telles MD 



                                         Start : 2020





Active

Advair Diskus 100-50 MCG/DOSE

one puff by dulce maria twice a day

Bryan Telles MD 



                                         Start : 2020





Active

Vitamin D 



Bryan Telles MD 



                                         Start : 2020





Active

Incruse Ellipta 62.5 MCG/INH



Bryan Telles MD 



                                         Start : 2020





Active

Levothyroxine 



Bryan Telles MD 



                                         Start : 2020





Active

Aricept 5 MG



Bryan Telles MD 



                                         Start : 2020





Active

Spironolactone 25 MG



Bryan Telles MD 



                           Start : 2018       End : 2018





Inactive

Aricept 5 MG



Bryan Telles MD 



                           Start : 2018       End : 2018





Inactive

Synthroid 25 MCG



Bryan Telles MD 



                           Start : 2018       End : 2018





Inactive

Multivitamin Adults 



Bryan Telles MD 



                           Start : 2018       End : 2018





Inactive

Potassium Chloride Makenzie ER 20 MEQ

2 tablets twice a day

Bryan Telles MD 



                           Start : 2018       End : 2018





Inactive

Vitamin D3 5000 UNIT



Bryan Telles MD 



                           Start : 2018       End : 2018





Inactive

Colace 100 MG

daily as needed for constipation

Bryan Telles MD 



                           Start : 2018       End : 2018





Inactive

predniSONE 10 MG

3 tablets for 3 days, 2 tablets for 3 days, 1 tablet for 3 days then stop.

Bryan Telles MD 



                           Start : 2018       End : 2018





Inactive

Mucinex 600 MG



Bryan Telles MD 



                           Start : 2018       End : 2018





Inactive

Coumadin 3 MG



Bryan Telles MD 



                           Start : 2018       End : 2018





Inactive

Albuterol Sulfate  (90 Base) MCG/ACT

2 puffs four times a day as needed for SOB

Bryan Telles MD 



                           Start : 2018       End : 2018





Inactive

Albuterol Sulfate (2.5 MG/3ML) 0.083%



Bryan Telles MD 



                           Start : 2018       End : 2018





Inactive

Calcium 500+D 500-200 MG-UNIT



Bryan Telles MD 



                           Start : 2018       End : 2018





Inactive

Advair Diskus 100-50 MCG/DOSE



Bryan Telles MD 



                           Start : 2018       End : 2018





Inactive

Simvastatin 40 MG



Elfego MD, Carballo* 



                           Start : 2018       End : 2018





Inactive

Torsemide 100 MG

0800 and Bryan Sepulveda MD 



                           Start : 2018       End : 2018





Inactive

Tylenol 325 MG

2 tablets Q6hrs as needed for pain. Max daily dose: 3000mg 

Haris Telles MD* 



                           Start : 2018       End : 2018





Inactive

                                                                                
                                                                                
                                                                                
                                                                                
                                                                                
        



Allergies and Adverse Reactions

          



                    Name                Dates               Details

 

                                         chlorpromazine (Allergy) Onset:    Status:  

Active 



 

                                         codeine (Allergy) Onset:  2020  

Status:  Active 



 

                                         Penicillin (Allergy) Onset:  

0  Status:  Active 





                                                                                
       



Results

                



                Date            Description     Value           Details

 

                                                                                

            

                                                  No Known Results              

 

                                                                

 

                                                                         



                                                                         



Plan of Care

                



                    Name                Dates               Details

 

                                        Instructions         

 

                                                  Diet:Regular Diet             

        

                                                                             Ins

truction Type: 

          Nutrition education                               

                           



                                                                         



Payers

                * Medicare - [facility]

* [facility]

## 2021-01-22 NOTE — REPVR
PROCEDURE INFORMATION: 

Exam: CT Cervical Spine Without Contrast 

Exam date and time: 1/22/2021 2:34 AM 

Age: 85 years old 

Clinical indication: Neck pain; Additional info: Trauma 



TECHNIQUE: 

Imaging protocol: Computed tomography images of the cervical spine without 

contrast. 

Radiation optimization: All CT scans at this facility use at least one of these 

dose optimization techniques: automated exposure control; mA and/or kV 

adjustment per patient size (includes targeted exams where dose is matched to 

clinical indication); or iterative reconstruction. 



COMPARISON: 

No relevant prior studies available. 



FINDINGS: 

Tubes, catheters and devices: Pacemaker in position from the left. 



Vertebrae: No acute fracture. Normal alignment. Ankylosis through the 

apophyseal joints from C3-C5. 

C2-C3: The disc is within normal limits with degenerative/arthritic changes 

primarily in the left apophyseal joint with no significant spinal or foraminal 

stenosis. 

C3-C4: Moderate interspace narrowing with slight anterolisthesis and residual 

bilateral hypertrophic changes. There is mild bilateral neural foraminal 

stenosis and borderline spinal stenosis. 

C4-C5: Prominent interspace narrowing with mild anterolisthesis and endplate 

sclerosis. There are bilateral degenerative changes with ankylosis, left 

greater than right with no spinal stenosis. There is borderline left neural 

foraminal stenosis. 

C5-C6: Moderate interspace narrowing with vacuum phenomena and bilateral 

degenerative changes with borderline bilateral neural foraminal stenosis. 

C6-C7: Mild interspace narrowing with slight anterolisthesis and minimal disc 

protrusion. There are bilateral degenerative changes, greatest in the right 

apophyseal joint with borderline right neural foraminal stenosis. 

C7-T1: Early degenerative changes of apophyseal joints with no spinal or 

foraminal stenosis. 



Soft tissues: Unremarkable. 

Lungs: Lung apices are normal. 



IMPRESSION: 

1. Multilevel degenerative/arthritic changes with no significant spinal 

stenosis. There is mild bilateral neural foraminal stenosis at C3-C4. No 

additional significant spinal or foraminal stenosis. 

2. No acute fracture or subluxation. 



Electronically signed by: Thomas Arevalo On 01/22/2021  03:20:56 AM

## 2021-01-22 NOTE — CCD
Continuity of Care Document

                             Created on: 2020



Xena Basilio

External Reference #: [id number]

: 1935

Sex: Female



Demographics





                          Address                   [address] [place] [address]

 

                          Preferred Language        English

 

                          Marital Status            Unknown

 

                          Scientology Affiliation     Unknown

 

                          Race                      Unknown

 

                          Ethnic Group              Unknown





Author





                          Author                    [employer]

d

 

                          Organization              Unknown

 

                          Address                   Unknown

 

                          Phone                     Unavailable







Care Team Providers





                    Care Team Member Name Role                Phone

 

                    Hairs Telles    Unavailable          [phone]

 

                          Unavailable               Unavailable

 

                    Haris Telles    Unavailable          [phone]

 

                          Unavailable               Unavailable

 

                    Young, Karolina       Unavailable          [phone]

 

                    Tracie Almonte Unavailable          [phone]

 

                    Elva Mendel       Unavailable          [phone]



                                                  



Problems

                



                    Name                Dates               Details

 

                                                             Acute combined syst

olic (congestive) and diastolic 

(congestive) heart failure          (I50.41)

                          29-Oct-2020               Status: Active



                                                                         



Medications

                



                    Name                Dates               Details

 

                                        Xopenex 0.63 MG/3ML

every 6 hours as needed for wheezing or shortness of breath 

Haris Telles MD 

 







Active

Biaxin 500 MG

1 tab 1 hour prior to dental procedures

Bryan Telles MD 



                                         Start : 2020





Active

Ipratropium-Albuterol 0.5-2.5 (3) MG/3ML

every 6 hours as needed for shortness of breath

Bryan Telles MD 



                                         Start : 2020





Active

Klor-Con M10 10 MEQ



Bryan Telles MD 



                                         Start : 2020





Active

Spironolactone 25 MG



Bryan Telles MD 



                                         Start : 2020





Active

Multivitamin 



Bryan Telles MD 



                                         Start : 2020





Active

Simvastatin 40 MG



Bryan Telles MD 



                                         Start : 2020





Active

Coumadin 3 MG



Bryan Telles MD 



                                         Start : 2020





Active

Albuterol Sulfate (2.5 MG/3ML) 0.083%

via nebulizer for SOB, no more than 4x/day

Bryan Telles MD 



                                         Start : 2020





Active

Tylenol 

2 by mouth everyday as needed for pain >5, no more than 3,000 mg/day.

Bryan Telles MD 



                                         Start : 2020





Active

Torsemide 100 MG



Bryan Telles MD 



                                         Start : 2020





Active

Calcium 



Bryan Telles MD 



                                         Start : 2020





Active

Colace 



Bryan Telles MD 



                                         Start : 2020





Active

Advair Diskus 100-50 MCG/DOSE

one puff by dulce maria twice a day

Bryan Telles MD 



                                         Start : 2020





Active

Vitamin D 



Bryan Telles MD 



                                         Start : 2020





Active

Incruse Ellipta 62.5 MCG/INH



Bryan Telles MD 



                                         Start : 2020





Active

Levothyroxine 



Bryan Telles MD 



                                         Start : 2020





Active

Aricept 5 MG



Bryan Telles MD 



                                         Start : 2020





Active

Spironolactone 25 MG



Bryan Telles MD 



                           Start : 2018       End : 2018





Inactive

Aricept 5 MG



Bryan Telles MD 



                           Start : 2018       End : 2018





Inactive

Synthroid 25 MCG



Bryan Telles MD 



                           Start : 2018       End : 2018





Inactive

Multivitamin Adults 



Bryan Telles MD 



                           Start : 2018       End : 2018





Inactive

Potassium Chloride Makenzie ER 20 MEQ

2 tablets twice a day

Bryan Telles MD 



                           Start : 2018       End : 2018





Inactive

Vitamin D3 5000 UNIT



Bryan Telles MD 



                           Start : 2018       End : 2018





Inactive

Colace 100 MG

daily as needed for constipation

Bryan Telles MD 



                           Start : 2018       End : 2018





Inactive

predniSONE 10 MG

3 tablets for 3 days, 2 tablets for 3 days, 1 tablet for 3 days then stop.

Bryan Telles MD 



                           Start : 2018       End : 2018





Inactive

Mucinex 600 MG



Bryan Telles MD 



                           Start : 2018       End : 2018





Inactive

Coumadin 3 MG



Bryan Telles MD 



                           Start : 2018       End : 2018





Inactive

Albuterol Sulfate  (90 Base) MCG/ACT

2 puffs four times a day as needed for SOB

Bryan Telles MD 



                           Start : 2018       End : 2018





Inactive

Albuterol Sulfate (2.5 MG/3ML) 0.083%



Bryan Telles MD 



                           Start : 2018       End : 2018





Inactive

Calcium 500+D 500-200 MG-UNIT



Bryan Telles MD 



                           Start : 2018       End : 2018





Inactive

Advair Diskus 100-50 MCG/DOSE



Bryan Telles MD 



                           Start : 2018       End : 2018





Inactive

Simvastatin 40 MG



Bryan Telles MD 



                           Start : 2018       End : 2018





Inactive

Torsemide 100 MG

0800 and noon 

Bryan Telles MD 



                           Start : 2018       End : 2018





Inactive

Tylenol 325 MG

2 tablets Q6hrs as needed for pain. Max daily dose: 3000mg 

Bryan Telles MD 



                           Start : 2018       End : 2018





Inactive

                                                                                
                                                                                
                                                                                
                                                                                
                                                                                
        



Allergies and Adverse Reactions

          



                    Name                Dates               Details

 

                                         chlorpromazine (Allergy) Onset:    Status:  

Active 



 

                                         codeine (Allergy) Onset:  2020  

Status:  Active 



 

                                         Penicillin (Allergy) Onset:  

0  Status:  Active 





                                                                                
       



Results

                



                Date            Description     Value           Details

 

                                                                                

            

                                                  No Known Results              

 

                                                                

 

                                                                         



                                                                         



Plan of Care

                



                    Name                Dates               Details

 

                                        Instructions         

 

                                                  Diet:Regular Diet             

        

                                                                             Ins

truction Type: 

          Nutrition education                               

                           



                                                                         



Payers

                * Medicare - [facility]

* [employer]

## 2021-01-22 NOTE — CCD
Continuity of Care Document (CCD)

                             Created on: 2020



Xena Basilio

External Reference #: [id number]

: 1935

Sex: Female



Demographics





                          Address                   [address]

[place]  [address]

 

                          Home Phone                [phone]

 

                          Preferred Language        Unknown

 

                          Marital Status            

 

                          Gnosticism Affiliation     Unknown

 

                          Race                      White

 

                          Ethnic Group              Not  or 





Author





                          Author                    Xena Bajwa

 

                          Organization              Unknown

 

                          Address                   [address] [facility] [address]

[place]  [address]



 

                          Phone                     [phone]







Care Team Providers





                    Care Team Member Name Role                Phone

 

                    Haris Telles JR, MD AUTM                Unavailable

 

                    Tiny Arnett  AUTM                [phone]

 

                    Kandy Rosas MD    AUTM                [phone]

 

                    Ronan Romero MD AUTM                [phone]

 

                    Екатерина Ward   AUTM                [phone]

 

                    [facility]  AUTM                [phone]







Problems





                    Active Problems     Provider            Date

 

                    Anticoagulants Long Term (Current) Use                     O

nset: 1997

 

                    Atrial fibrillation                     Onset: 1997

 

                    Contact dermatitis  CLARISSA Bajwa    Onset: 2011

 

                    Heart valve replacement CLARISSA Bajwa    Onset: 

3

 

                    Mitral valve disorder CLARISSA Bajwa    Onset: 2013

 

                    Essential hypertension JOON Robin Onset: 

3

 

                    Anticoagulant agent CLARISSA Bajwa    Onset: 07/15/2015

 

                    Chronic atrial fibrillation Haris Telles MD Onset: 

 

                    Hypothyroidism      Haris Telles MD Onset: 10/04/2017







Social History





                Type            Date            Description     Comments

 

                Birth Sex                       Unknown          

 

                Tobacco Use     Start: Unknown End: Unknown Patient is a former 

smoker  

 

                Exercise Type/Frequency                 Exercises rarely  







Allergies, Adverse Reactions, Alerts





             Active Allergies Reaction     Severity     Comments     Date

 

             Codeine,PCN                                         2010

 

             Thorazine                                           2016







Medications





           Active Medications SIG        Qnty       Indications Ordering Provide

r Date

 

                          Xopenex                     0.63mg/3ML Nebulizer      

             use four 

times daily prn for wheezing and shortness of breath. DX J44.9 36ml             

                       Екатерина Nguyen [facility]                               10/26/2020

 

                                        Culturelle Dig[employer] Probiotic   

                   Capsules             

                one twice daily while on antibiotics or if having diarrhea 30cap

s                          Екатерина Nguyen [facility]                               10/23/2020

 

                                        Vitamin D3 Ultra Strength               

      125mcg (5000 Ut) Capsules         

             1 by mouth every day 90caps                    Екатерина Nguyen [facility] 

 

                          Nebulizer Kit/Tubing/Mouthpiece                      K

it                   for 

use with nebulizer--use up to four times a day dx j44.9 1units                  

                Екатерина Nguyen 

[facility]                                     2019

 

                          Incruse Ellipta                     62.5mcg/Inh Aeroso

l                   inhale

one puff by mouth daily 3units                          Екатерина Nguyen [facility] 01/15/2

019

 

                          Levothyroxine Sodium                     25mcg Tablets

                   1 

tablet by mouth every day 90tabs                          Екатерина Nguyen [facility] 10/04

/2017

 

                          Aricept                     5mg Tablets               

    1 by mouth every day 

at bedtime      90tabs          G30.1           Sravan Franks M.D. 10/03/2017

 

                          Advair Diskus                     100-50mcg/Dose Aeros

ol                   

inhale one puff by mouth twice a day 180units                        Екатерина Nguyen [facility] 2017

 

                          Colace                     100mg Capsules             

      1 by mouth twice a 

day             180caps         K59.00          Екатерина Nguyen [facility] 2017

 

                          Calcium 500+D                     500-200mg-Unit Table

ts                   1 by 

mouth three times a day 270tabs                         Haris Telles MD 

 

                          Duoneb                     0.5-2.5(3)mg/3ML Solution  

                 inhale 

the contents of one vial via nebulizer four times a day as needed for wheezing 
or shortness of breath 270ml           R06.02          Екатерина Nguyen [facility] 05/15/20

17

 

                          Torsemide                     100mg Tablets           

        1tablet by mouth 

daily           30tabs          R60.0           Екатерина Nguyen [facility] 05/15/2017

 

                          Tylenol                     325mg Capsules            

       2 by mouth every 

day as needed   180caps                         Екатерина Nguyen [facility] 2017

 

                                        Albuterol Sulfate                     (2

.5mg/3ML) 0.083% Nebulizer              

             q.i.d. prn via nebulizer dx J44.9 75ml                      Екатерина Nguyen [facility] 2016

 

                          Coumadin                     3mg Tablets              

     take 1 tablet by 

mouth once a day as directed 90tabs                          Екатерина Nguyen, [facility] 

 

                          Simvastatin                     40mg Tablets          

         take one tablet 

by mouth at bedtime 90tabs                          Екатерина Nguyen [facility] 2015

 

                    Multi-Vitamins                      Tablets                 

  1 by mouth daily    

90tabs                                  Екатерина Nguyen, [facility]    2003

 

                          Spironolactone                     25mg Tablets       

            1 by mouth 

every day                                       Unknown         

 

                          Klor-Con M10                     10Meq Tablets ER     

              2 by mouth 

every day                                       Unknown         

 

                          Losartan Potassium                     25mg Tablets   

                1 by mouth

every day                                       Unknown         

 

                          Ciprofloxacin HCL                     500mg Tablets   

                1 tab by 

mouth twice daily                                 Unknown         

 

                          Metronidazole                     500mg Tablets       

            one by mouth 

three times a day for 10 days                                 Unknown         







Medications Administered in Office





           Medication SIG        Qnty       Indications Ordering Provider Date

 

                          Administration Of Flu Vaccine                      Inj

ection                    

                                                Екатерина Nguyen, [facility] 2019

 

                                        Immunization Adminstration,1 Vaccine/Tox

oid                      Injection      

                                                    [facility]

 

                          Administration Of Flu Vaccine                      Inj

ection                    

                                                Екатерина Nguyen, [facility] 10/19/2018

 

                          Administration Of Flu Vaccine                      Inj

ection                    

                                                Kelsea Adams, [facility] 10/03/2017

 

                          Administration Of Flu Vaccine                      Inj

ection                    

                                                Kelsea Adams, [facility] 10/13/2016

 

                          Administration Of Flu Vaccine                      Inj

ection                    

                                                Haris Telles MD 10/15/2015

 

                          Administration Of Flu Vaccine                      Inj

nonaion                    

                                                Haris Telles MD 10/23/2014

 

                          Administration Of Flu Vaccine                      Inj

bruce Telles MD 10/12/2012

 

                          Administration Of Flu Vaccine                      Inj

nonaion                    

                                                Haris Telles MD 10/11/2011

 

                          Administration Of Flu Vaccine                      Inj

nonaion                    

                                                Haris Telles MD 10/07/2010

 

                          Administration Of Flu Vaccine                      Inj

nonaion                    

                                                Haris Telles MD 10/08/2009

 

                          Administration Of Flu Vaccine                      Inj

ection                    

                                                Haris Telles MD 2008

 

                          Administration Of Flu Vaccine                      Inj

ection                    

                                                Haris Telles MD 10/18/2007

 

                Administration Of Zostavax                      Injection       

                                            

                          Haris Telles MD     2007

 

                          Administration Of Flu Vaccine                      Inj

ection                    

                                                JOON Robin 2006

 

                          Administration Of Flu Vaccine                      Inj

ection                    

                                                Haris Telles MD 10/04/2005

 

                          Administration Of Flu Vaccine                      Inj

ection                    

                                                CLARISSA Bajwa 10/19/2004

 

                          Administration Of Flu Vaccine                      Inj

ection                    

                                                Haris Telles MD 2003

 

                          Administration Of Flu Vaccine                      Inj

ection                    

                                                Екатерина Le [place], FNP 10/08/2002







Immunizations





             CPT Code     Status       Date         Vaccine      Lot #

 

             U-Flu        Given        10/05/2020   Influenza,Unspecified  

 

             [id number]        Given        2020   PPD          [id number]

 

                26884           Given           2019      Influenza Vaccin

e Quadrivalent Preser/Antibiotic Free Im 

Use                                     [id number]

 

             56804        Given        2019   Adacel- Tetanus Diphtheria P

ertussis (Age65 & Under) 

[id number]

 

             66264        Given        2019   Shingrix      

 

             02146        Given        2018   Shingrix      

 

                45000           Given           10/19/2018      Influenza Virus 

Vaccine, Quadrivalent (Cciiv4), Derived 

From Cell                               [id number]

 

                65839           Given           10/03/2017      Influenza Vaccin

e Quadrivalent Preser/Antibiotic Free Im 

Use                                     [id number]

 

                     Given        10/13/2016   Fluvirin Virus Vaccine 01830

01

 

                     Given        10/15/2015   Fluvirin Virus Vaccine 51633

01

 

             33348        Given        2015   Prevnar 13   [id number]

 

                     Given        10/23/2014   Fluvirin Virus Vaccine 02024

21

 

                     Given        10/12/2012   Fluvirin Virus Vaccine 11133

01

 

                     Given        10/11/2011   Fluvirin Virus Vaccine  

 

                     Given        10/11/2011   Fluvirin Virus Vaccine  

 

             09024        Given        10/07/2010   Influenza Virus Vaccine  

 

             13878        Given        2009   Pneumovax 23  

 

             70994        Given        10/08/2009   Influenza Virus Vaccine  

 

             19652        Given        2008   Influenza Virus Vaccine  

 

             83147        Given        10/18/2007   Influenza Virus Vaccine  

 

             16263        Given        2007   Zoster Vaccine  

 

             87393        Given        2006   Influenza Virus Vaccine  

 

             44350        Given        10/04/2005   Influenza Virus Vaccine  

 

             81650        Given        10/19/2004   Influenza Virus Vaccine  

 

             57123        Given        2003   Influenza Virus Vaccine  

 

             22878        Given        10/08/2002   Influenza Virus Vaccine  

 

             53151        Given        2001   Influenza Virus Vaccine  

 

             04399        Given        2001   Tetanus Toxoid  

 

             U-Pneum      Given        10/01/2000   Pneumococcal,Unspecified  

 

             12902        Given        10/21/1999   Influenza Virus Vaccine  

 

             08620        Given        10/25/1994   Influenza Virus Vaccine  







Vital Signs





                Date            Vital           Result          Comment

 

                10/29/2020 11:21am Heart Rate      82 /min          

 

                    Weight              163.00 lb            

 

                    O2 % BldC Oximetry  94 %                 

 

                09/10/2020  1:40pm BP Systolic     130 mmHg         

 

                    BP Diastolic        50 mmHg              

 

                    Heart Rate          56 /min              

 

                    Body Temperature    98.4 F             

 

                    Respiratory Rate    14 /min              

 

                    Height              65.5 inches         5'5.50"

 

                    Weight              147.00 lb            

 

                    O2 % BldC Oximetry  95 %                 

 

                    BMI (Body Mass Index) 24.1 kg/m2           







Results





        Test    Acquired Date Facility Test    Result  H/L     Range   Note

 

                    Complete Blood Count 2020          [facility]

enter

                                        [address] [place] [address] [phone] White Blood Count 7.8 10     Normal     4.0-10.0    

 

             Red Blood Count 4.46 10      Normal       4.00-5.40     

 

             Hemoglobin   13.3 g/dL    Normal       12.0-15.5     

 

             Hematocrit   41.6 %       Normal       36.0-47.0     

 

             Mean Corpuscular Volume 93.3 fl      Normal       80.0-96.0     

 

             Mean Corpuscular Hemoglobin 29.8 pg      Normal       27.0-33.0    

 

 

             Mean Corpuscular HGB Conc 32.0 g/dL    Normal       32.0-36.5     

 

             Red Cell Distribution Width 15.9 %       High         11.5-14.5    

 

 

             Platelet Count, Automated 231 10       Normal       150-450       

 

             Nucleated Red Blood Cell % 0.0 %        Normal       0-0           

 

                    Comprehensive Metabolic Profil 2020          [address] [place] [address] [phone] Glucose, Fasting 99 mg/dL   Normal           

 

             Blood Urea Nitrogen 21 mg/dL     High         7-18          

 

             Creatinine For GFR 1.07 mg/dL   Normal       0.55-1.30     

 

             Glomerular Filtration Rate 52.1         Normal       >32          1

 

             Sodium Level 136 mEq/L    Normal       136-145       

 

             Potassium Serum 4.2 mEq/L    Normal       3.5-5.1       

 

             Chloride Level 95 mEq/L     Low                  

 

             Carbon Dioxide Level 35 mEq/L     High         21-32         

 

             Anion Gap    6 mEq/L      Low          8-16          

 

             Calcium Level 10.1 mg/dL   Normal       8.8-10.2      

 

             Ast/Sgot     32 U/L       Normal       7-37          

 

             Alt/SGPT     23 U/L       Normal       12-78         

 

             Alkaline Phosphatase 66 U/L       Normal               

 

             Bilirubin,Total 0.7 mg/dL    Normal       0.2-1.0       

 

             Total Protein 6.6 GM/DL    Normal       6.4-8.2       

 

             Albumin      3.7 GM/DL    Normal       3.2-5.2       

 

             Albumin/Globulin Ratio 1.3          Normal       1.2-2.2       

 

                    Type & Screen -Incl Blood Type,Walker,AB SC 10/20/2020         

 [address] [address] [phone] Blood Type A POSITIVE  Normal                 

 

             AB Screen (Indirect Anatoly)Vis NEGATIVE     Normal                 

    

 

                    Laboratory test finding 10/20/2020          [address] [address] [phone] Lipase     157 U/L    Normal           

 

             Thyroid Stimulating Hormone 3.480 uIU/ML Normal       0.358-3.740  

 

 

                    Liver Profile       10/20/2020          [facility]

nter

                                        [address] [place] [address] [phone] Ast/Sgot   23 U/L     Normal     7-37        

 

             Alt/SGPT     24 U/L       Normal       12-78         

 

             Alkaline Phosphatase 72 U/L       Normal               

 

             Bilirubin,Total 1.2 mg/dL    High         0.2-1.0       

 

             Bilirubin,Direct 0.4 mg/dL    High         0.0-0.2       

 

             Total Protein 6.7 GM/DL    Normal       6.4-8.2       

 

             Albumin      3.7 GM/DL    Normal       3.2-5.2       

 

             Albumin/Globulin Ratio 1.2          Normal       1.2-2.2       

 

                    Cardiac Marker Panel 10/20/2020          [facility]

enter

                                        [address] [place] [address] [phone] CPK Creatine Phosphokinase 62 U/L     Normal     26-19

2      

 

             CK-MB Value Mass < 1.0 NG/ML  Normal       <3.6          

 

             MB/CK Relative Index 1.61         Normal       < Or =4      2

 

             Troponin I   < 0.02 NG/ML Normal       < 0.10       3

 

                    Ua W/ Reflex To Culture 10/20/2020          [address] [address] [phone] Appearance, Urine RFX CLEAR      Normal     Clear     

  

 

             Color, Urine RFX YELLOW       Normal       Yellow        

 

             PH,Urine RFX 6.0 units    Normal       5.0-9.0       

 

             Specific Gravity Ur Auto RFX 1.012        Normal       1.002-1.035 

  

 

             Protein, Urine Auto RFX NEGATIVE mg/dL Normal       Negative      

 

             Glucose, Urine (Ua) Auto RFX NEGATIVE mg/dL Normal       Negative  

    

 

             Ketone, Urine Auto RFX NEGATIVE mg/dL Normal       Negative      

 

             Urobilinogen, Urine Auto RFX 0.2 mg/dL    Normal       0.0-2.0     

  

 

             Bilirubin, Urine Auto RFX NEGATIVE     Normal       Negative      

 

             Nitrite, Urine Auto RFX NEGATIVE     Normal       Negative      

 

             Leukocyte Esterase Ur Auto RFX NEGATIVE     Normal       Negative  

    

 

             Blood, Urine Blood RFX NEGATIVE     Normal       Negative      

 

             WBC, Urine Auto RFX 1 /HPF       Normal       0-3           

 

             RBC, Urine Auto RFX 1 /HPF       Normal       0-3           

 

             Bacteria, Urine Auto RFX NEGATIVE     Normal       Negative      

 

             Squam Epithelial Cell Ur Aurfx 0 /HPF       Normal       0-6       

    

 

             Hyaline Cast, Urine Auto RFX 0 /LPF       Normal       0-1         

  

 

                    Laboratory test finding 10/20/2020          [address] [address] [phone] Ammonia    < 10 uMOL/L Normal     <32         

 

                    CBC With Differential 10/20/2020          [address] [address] [phone] White Blood Count 14.2 10    High       4.0-10.0    

 

             Red Blood Count 4.35 10      Normal       4.00-5.40     

 

             Hemoglobin   13.1 g/dL    Normal       12.0-15.5     

 

             Hematocrit   40.2 %       Normal       36.0-47.0     

 

             Mean Corpuscular Volume 92.4 fl      Normal       80.0-96.0     

 

             Mean Corpuscular Hemoglobin 30.1 pg      Normal       27.0-33.0    

 

 

             Mean Corpuscular HGB Conc 32.6 g/dL    Normal       32.0-36.5     

 

             Red Cell Distribution Width 15.3 %       High         11.5-14.5    

 

 

             Platelet Count, Automated 178 10       Normal       150-450       

 

             Neutrophils % 75.0 %       High         36.0-66.0     

 

             Lymph %      5.6 %        Low          24.0-44.0     

 

             Mono %       17.9 %       High         0.0-5.0       

 

             Eos %        0.5 %        Normal       0.0-3.0       

 

             Baso %       0.4 %        Normal       0.0-1.0       

 

             Immature Granulocyte % 0.6 %        Normal       0-3.0         

 

             Nucleated Red Blood Cell % 0.0 %        Normal       0-0           

 

             Neutrophils # 10.7 10      High         1.5-8.5       

 

             Lymph #      0.8 10       Low          1.5-5.0       

 

             Mono #       2.5 10       High         0.0-0.8       

 

             Eos #        0.1 10       Normal       0.0-0.5       

 

             Baso #       0.1 10       Normal       0.0-0.2       

 

                    Istat PT/Inr        10/20/2020          [facility]

nter

                                        [address] [place] [address] [phone] iSTAT Protime Seconds 31.3 seconds High       12.1-14.

4   

 

             iSTAT Inr    2.7          Normal                     

 

                    Istat Chem8+ Panel  10/20/2020          [facility]

nter

                                        [address] [place] [address] [phone] iSTAT HCT  41.0 %     Normal     38.0-51.0   

 

             iSTAT Glucose 107 mg/dL    High                 

 

             iSTAT Sodium 136 mEq/L    Normal       136-145       

 

             iSTAT Potassium 3.9 mEq/L    Normal       3.5-5.1       

 

             iSTAT CA++   5.1 mg/dL    Normal       4.5-5.3       

 

             iSTAT Chloride 95 mEq/L     Low                  

 

             iSTAT Co2    30.0 MM/L    High         23.0-27.0     

 

             iSTAT BUN    30 mg/dL     High         8-26          

 

             iSTAT Creatinine 1.2 mg/dL    Normal       0.6-1.3       

 

                    Laboratory test finding 10/20/2020          [address] [address] [phone] iSTAT Lactate 0.90       Normal     0.4-2.0     

 

                    Laboratory test finding 10/20/2020          [address] [address] [phone] iSTAT Troponin 0.02 NG/ML Normal     0.00-0.08   

 

                    Complete Blood Count 09/10/2020          [facility]

s pc

: Dr Haris Telles

[place] [address]

           [phone] WBC        7.4 x10*3/UL            4.1 - 10.9  

 

             RBC          5.02 x10*6/UL              4.20 - 6.30   

 

             Hemoglobin   14.8 g/dL                 12.0 - 18.0   

 

             Hematocrit   44.4 %                    37.0 - 51.0   

 

             MCV          88.5 fL                   80.0 - 97.0   

 

             MCH          29.4 pg                   26.0 - 32.0   

 

             MCHC         33.3 g/dL                 31.0 - 38.0   

 

             RDW          14.3 %       High         11.6 - 13.7   

 

             PLT          208 x10*3/UL              140 - 440     

 

             MPV          8.8 FL                    7.8 - 11.0    

 

             Lymph %      16.7 %                    10.0 - 58.5   

 

             Mid %        4.6 %                     1.7 - 9.3     

 

             Neut %       78.7 %                    37.0 - 92.0   

 

             Lymph #      1.2 x10*3/UL              0.6 - 4.1     

 

             Mid #        0.4 x10*3/UL              0.1 - 0.6     

 

             Neut #       5.8 x10*3/UL              2.0 - 7.8     

 

                    Basic Metabolic Panel 09/10/2020          [facility]

[facility], [facility]

: Dr Haris Telles

[place] [address] [phone] Glucose    100 mg/dL  High       74 - 99    4

 

             BUN          24 mg/dL     High         7 - 18        

 

             Creatinine   1.4 mg/dL    High         0.6 - 1.3     

 

             Sodium       139 mEq/L                 136 - 145     

 

             Potassium    4.3 mEq/L                 3.5 - 5.1     

 

             Chloride     99 mEq/L                  98 - 107      

 

             Carbon Dioxide 36 mEq/L     High         21 - 32       

 

             Calcium      9.9 mg/dL                 8.5 - 10.1    

 

             GFR  36 mL/min    Low          >60           

 

             GFR African American 44 mL/min    Low          >60          5

 

                    Laboratory test finding 09/10/2020          [facility]

[facility]

: Dr aHris Telles

[place], [place] [id number] [phone] Thyroid Stimulating Hormone 3.19 uIU/mL            0.3

6 - 3.74  

 

                    Laboratory test finding 2020          [facility]

             Inr          3.1                                     

 

                    Laboratory test finding 2020          [facility]

             Inr          2.5                                     

 

                    Complete Blood Count 06/10/2020          [facility]

s, pc

: [place] [address] [phone] WBC        8.1 x10*3/UL            4.1 - 10.9  

 

             RBC          5.07 x10*6/UL              4.20 - 6.30   

 

             Hemoglobin   14.8 g/dL                 12.0 - 18.0   

 

             Hematocrit   44.9 %                    37.0 - 51.0   

 

             MCV          88.5 fL                   80.0 - 97.0   

 

             MCH          29.3 pg                   26.0 - 32.0   

 

             MCHC         33.1 g/dL                 31.0 - 38.0   

 

             RDW          14.2 %       High         11.6 - 13.7   

 

             PLT          206 x10*3/UL              140 - 440     

 

             MPV          8.7 FL                    7.8 - 11.0    

 

             Lymph %      16.9 %                    10.0 - 58.5   

 

             Mid %        4.7 %                     1.7 - 9.3     

 

             Neut %       78.4 %                    37.0 - 92.0   

 

             Lymph #      1.3 x10*3/UL              0.6 - 4.1     

 

             Mid #        0.5 x10*3/UL              0.1 - 0.6     

 

             Neut #       6.3 x10*3/UL              2.0 - 7.8     

 

                    Basic Metabolic Panel 06/10/2020          [facility]

[facility]

: Dr Haris Telles

[place] [address] [phone] Glucose    76 mg/dL              74 - 99    6

 

             BUN          22 mg/dL     High         7 - 18        

 

             Creatinine   1.2 mg/dL                 0.6 - 1.3     

 

             Sodium       144 mEq/L                 136 - 145     

 

             Potassium    4.2 mEq/L                 3.5 - 5.1     

 

             Chloride     104 mEq/L                 98 - 107      

 

             Carbon Dioxide 33 mEq/L     High         21 - 32       

 

             Calcium      9.7 mg/dL                 8.5 - 10.1    

 

             GFR  43 mL/min    Low          >60           

 

             GFR African American 52 mL/min    Low          >60          7

 

                    Laboratory test finding 06/10/2020          [facility]

: Dr Haris Telles

[place], [place] [id number] [phone] Magnesium  2.1 mg/dL             1.8 - 2.4   







                          1                         Units are mL/min/1.73 m2



Chronic Kidney Disease Staging per NKF:



Stage I & II   GFR >=60       Normal to Mildly Decreased

Stage III      GFR 30-59      Moderately Decreased

Stage IV       GFR 15-29      Severely Decreased

Stage V        GFR <15        Very Little GFR Left

ESRD           GFR <15 on RRT



 

                          2                         DIAGNOSIS CRITERIA

MMB ng/ml       Relative Index (RI)

NON-AMI               < or = 5               N/A

GRAY ZONE              > 5                < or = 4

AMI                    > 5                   > 4



 

                          3                         Troponin I Reference Interva

l for Siemens Vista LOCI:



                                        99th Percentile= 0.00-0.045 ng/ml



Risk Stratification:

<= 0.10 ng/ml   Decreased Risk for Adverse Clinical

Events.

                                        0.10-1.50 ng/ml   Increased Risk for Adv

erse Clinical

Events. Evaluation of additional

criterion and/or repeat testing in 2-6

hours is suggested to rule out myocardial

damage.

>= 1.50 ng/ml   Indicative of Myocardial Injury.



 

                          4                         100-125 mg/dL     PRE-DIABET

ES/FASTING

>126 mg/dL          DIABETES/FASTING



 

                          5                         CHRONIC KIDNEY DISEASE STAGI

NG PER NKF



STAGE I & II      GFR >= 60        NORMAL TO MILDLY DECREASED

STAGE III          GFR 30-59          MODERATELY DECREASED

STAGE IV           GFR 15-29         SEVERELY DECREASED

STAGE V            GFR <15            VERY LITTLE GFR LEFT

ESRD                 GFR <15            ON RRT



 

                          6                         100-125 mg/dL     PRE-DIABET

ES/FASTING

>126 mg/dL          DIABETES/FASTING



 

                          7                         CHRONIC KIDNEY DISEASE STAGI

NG PER NKF



STAGE I & II      GFR >= 60        NORMAL TO MILDLY DECREASED

STAGE III          GFR 30-59          MODERATELY DECREASED

STAGE IV           GFR 15-29         SEVERELY DECREASED

STAGE V            GFR <15            VERY LITTLE GFR LEFT

ESRD                 GFR <15            ON RRT









Procedures





                Date            Code            Description     Status

 

                2018      [id number]       Diabetic Foot Exam Completed

 

                2017      [id number]        Mammogram       Completed

 

                01/15/2016      [id number]        Mammogram       Completed

 

                2015      [id number]        Mammogram       Completed

 

                2014      [id number]        Mammogram       Completed

 

                2013      [id number]       Diabetic Retinal Eye Exam Comple

[facility]

 

                2013      [id number]       Bone Mineral Density Test [facility]

 

                01/10/2013      [id number]        Mammogram       Completed

 

                2012      [id number]        Mammogram       Completed

 

                06/10/2011      [id number]       Bone Mineral Density Test [facility]

 

                2011      [id number]        Mammogram       Completed

 

                2009      [id number]        Mammogram       Completed

 

                2008      [id number]       Bone Mineral Density Test [facility]

 

                10/22/2004      [id number]        Colonoscopy     Completed







Medical Devices





                                        Description

 

                                        No Information Available







Encounters





           Type       Date       Location   Provider   Dx         Diagnosis

 

             Office Visit 09/10/2020  1:40p [facility] Екатерина Garcia Pi

ne, FNP I13.0

                                        Hyp hrt & chr kdny dis w hrt fail and st

g 1-4/unsp chr kdny

 

                          I50.42                    Chronic combined systolic an

d diastolic hrt fail

 

                          N18.3                     Chronic kidney disease, stag

e 3 (moderate)

 

                          J44.9                     Chronic obstructive pulmonar

y disease, unspecified

 

                          G30.1                     Alzheimer's disease with lat

e onset

 

                          F02.80                    Dementia in oth diseases [facility] elswhr w/o behavrl disturb

 

                          I48.21                    Permanent atrial fibrillatio

n

 

                          Z79.01                    Long term (current) use of a

nticoagulants

 

                          Z95.2                     Presence of prosthetic heart

 valve

 

                          I87.2                     Venous insufficiency (chroni

c) (peripheral)

 

             Office Visit 06/10/2020  2:00p [facility] Екатерина Lemus

ne, [facility] I13.0

                                        Hyp hrt & chr kdny dis w hrt fail and st

g 1-4/[facility] chr kdny

 

                          I50.42                    Chronic combined systolic an

d diastolic hrt fail

 

                          N18.3                     Chronic kidney disease, stag

e 3 (moderate)

 

                          J44.9                     Chronic obstructive pulmonar

y disease, unspecified

 

                          G30.1                     Alzheimer's disease with lat

e onset

 

                          F02.80                    Dementia in oth diseases [facility] elswhr w/o behavrl disturb

 

                          I48.21                    Permanent atrial fibrillatio

n

 

                          Z79.01                    Long term (current) use of a

nticoagulants

 

                          Z95.2                     Presence of prosthetic heart

 valve

 

                          Z13.89                    Encounter for screening for 

other disorder







Assessments





                Date            Code            Description     Provider

 

                    10/29/2020          I50.41              Acute combined systo

lic (congestive) and diastolic 

(congestive) heart failure              IDOGO Bajwa

 

                10/29/2020      J44.9           Chronic obstructive pulmonary di

sease, unspecified Екатерина Nguyen

[facility]

 

                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

idney disease with heart lakshmi 

DIOGO Bajwa

 

                10/29/2020      N18.31          Chronic kidney disease, stage 3a

 Екатерина Nguyen [facility]

 

                10/29/2020      I48.21          Permanent atrial fibrillation An

marino Nguyen [facility]

 

                10/29/2020      Z95.2           Presence of prosthetic heart zakia

ve Екатерина Nguyen [facility]

 

                10/29/2020      Z79.01          Long term (current) use of antic

oagulants DIOGO Bajwa

 

                10/29/2020      R26.89          Other abnormalities of gait and 

mobility CLARISSA Bajwa

 

                2020      J44.9           Chronic obstructive pulmonary di

sease, unspecified Haris Telles MD

 

                2020      G30.1           Alzheimer's disease with late on

set Haris Telles MD

 

                2020      I48.21          Permanent atrial fibrillation Co

amanda Telles MD

 

                2020      I10             Essential (primary) hypertension

 Haris Telles MD

 

                09/10/2020      I13.0           Hypertensive heart and chronic k

idney disease with heart lakshmi 

Екатерина Nguyen, [facility]

 

                    09/10/2020          I50.42              Chronic combined sys

tolic (congestive) and diastolic 

(congestive) heart failure              Екатерина Nguyen, [facility]

 

                09/10/2020      N18.3           Chronic kidney disease, stage 3 

(moderate) Екатерина Nguyen, [facility]

 

                09/10/2020      J44.9           Chronic obstructive pulmonary di

sease, unspecified Екатерина Nguyen,

[facility]

 

                09/10/2020      G30.1           Alzheimer's disease with late on

set Екатерина Nguyen, [facility]

 

                    09/10/2020          F02.80              Dementia in other di

seases classified elsewhere without 

behavioral disturbance                  Екатерина Nguyen [facility]

 

                09/10/2020      I48.21          Permanent atrial fibrillation An

n Radha Gonaclves, [facility]

 

                09/10/2020      Z79.01          Long term (current) use of antic

oagulants Екатерина Nguyen, [facility]

 

                09/10/2020      Z95.2           Presence of prosthetic heart zakia

ve Екатерина Nguyen, [facility]

 

                09/10/2020      I87.2           Venous insufficiency (chronic) (

peripheral) Екатерина Nguyen [facility]

 

                2020      Z11.1           Encounter for screening for resp

iratory tuberculosis Haris Telles MD

 

                2020      I48.21          Permanent atrial fibrillation An

n Radha Goncalves [facility]

 

                2020      I48.21          Permanent atrial fibrillation Pr

otime

 

                2020      Z51.81          Encounter for therapeutic drug l

evel monitoring Екатерина Nguyen 

[facility]

 

                2020      Z51.81          Encounter for therapeutic drug l

evel monitoring [employer]

 

                2020      Z79.01          Long term (current) use of antic

oagulants Екатерина Nguyen [facility]

 

                2020      Z79.01          Long term (current) use of antic

oagulants [facility]

 

                2020      I48.21          Permanent atrial fibrillation An

n Radha Goncalves [facility]

 

                2020      I48.21          Permanent atrial fibrillation Pr

otime

 

                2020      Z51.81          Encounter for therapeutic drug l

evel monitoring Екатерина Nguyen, 

[facility]

 

                2020      Z79.01          Long term (current) use of antic

oagulants Екатерина Nguyen, [facility]

 

                2020      I10             Essential (primary) hypertension

 Haris Telles MD

 

                2020      N18.3           Chronic kidney disease, stage 3 

(moderate) Haris Telles MD

 

                2020      J44.9           Chronic obstructive pulmonary di

sease, unspecified Haris Telles MD

 

                2020      G30.1           Alzheimer's disease with late on

set Haris Telles MD

 

                2020      N18.3           Chronic kidney disease, stage 3 

(moderate) Haris Telles MD

 

                2020      J44.9           Chronic obstructive pulmonary di

sease, unspecified Haris Telles MD

 

                2020      G30.1           Alzheimer's disease with late on

set Haris Telles MD

 

                2020      I10             Essential (primary) hypertension

 Haris Telles MD

 

                06/10/2020      I13.0           Hypertensive heart and chronic k

idney disease with heart lakshmi 

Екатерина Nguyen, [facility]

 

                    06/10/2020          I50.42              Chronic combined sys

tolic (congestive) and diastolic 

(congestive) heart failure              Екатерина Nguyen [facility]

 

                06/10/2020      N18.3           Chronic kidney disease, stage 3 

(moderate) Екатерина Nguyen [facility]

 

                06/10/2020      J44.9           Chronic obstructive pulmonary di

sease, unspecified Екатерина Nguyen

[facility]

 

                06/10/2020      G30.1           Alzheimer's disease with late on

set Екатерина Nguyen, [facility]

 

                    06/10/2020          F02.80              Dementia in other di

seases classified elsewhere without 

behavioral disturbance                  Екатерина Nguyen [facility]

 

                06/10/2020      I48.21          Permanent atrial fibrillation An

n Radha Goncalves, [facility]

 

                06/10/2020      Z79.01          Long term (current) use of antic

oagulants Екатерина Nguyen, [facility]

 

                06/10/2020      Z95.2           Presence of prosthetic heart zakia

ve Екатерина Nguyen, [facility]

 

                06/10/2020      Z13.89          Encounter for screening for othe

r disorder CLARISSA Bajwa

 

                2020      N18.3           Chronic kidney disease, stage 3 

(moderate) Haris Telles MD

 

                2020      J44.9           Chronic obstructive pulmonary di

sease, unspecified Haris Telles MD

 

                2020      I10             Essential (primary) hypertension

 Haris Telles MD

 

                2020      E66.3           Overweight      Haris srinivasan MD

 

                2020      N18.3           Chronic kidney disease, stage 3 

(moderate) Haris Telles MD

 

                2020      J44.9           Chronic obstructive pulmonary di

sease, unspecified Haris Telles MD

 

                2020      G30.1           Alzheimer's disease with late on

set Haris Telles MD

 

                2020      I10             Essential (primary) hypertension

 Haris Telles MD







Plan of Treatment

Future Appointment(s):* 2020  3:00 pm - CLARISSA Bajwa at [facility]

* 2020  3:20 pm - CLARISSA Bajwa at [facility]

10/29/2020 - CLARISSA Bajwa* I50.41 Acute combined systolic (congestive) and 
  diastolic (congestive) heart failure* New Orders:* CBC and CMP, Scheduled: 
  20



* Comments:* will increase Torsemide to 100mg daily.will arrange for [facility] to evaluate.





* J44.9 Chronic obstructive pulmonary disease, unspecified* Comments:* will 
  request BMP in one week.





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
  * Blood pressure is controlled on current treatment plan.





* N18.31 Chronic kidney disease, stage 3a

* I48.21 Permanent atrial fibrillation* Comments:* Rate controlled.





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

* Z79.01 Long term (current) use of anticoagulants* Comments:* INR completed at 
  home.  Has been supratherapeutic due to interaction of Warfarin and 
  Metronidazole.  Verbal  instructions given. Signs and symptoms to report re
  viewed.  No evidence of thromboembolic or bleeding events.





* R26.89 Other abnormalities of gait and mobility* New Orders:* Physical 
  Therapy, Ordered: 10/29/20



* Comments:* will request home physical therapy.





* All * Comments:* Will need MARITZA.Tried to discuss [facility] and advance 
  directives.Booklet "hard choices for loving people" given.









Functional Status





                                        Description

 

                                        No Information Available







Mental Status





                                        Description

 

                                        No Information Available







Referrals





                                        Description

 

                                        No Information Available

## 2021-01-22 NOTE — CCD
Continuity of Care Document (CCD)

                             Created on: 2020



Xena Basliio

External Reference #: [id number]

: 1935

Sex: Female



Demographics





                          Address                   [address]

[place]  [address]

 

                          Home Phone                [phone]

 

                          Preferred Language        Unknown

 

                          Marital Status            

 

                          Advent Affiliation     Unknown

 

                          Race                      White

 

                          Ethnic Group              Not  or 





Author





                          Author                    Xena Bajwa

 

                          Organization              Unknown

 

                          Address                   [address] [facility] [address]

[place]  [address]



 

                          Phone                     [phone]







Care Team Providers





                    Care Team Member Name Role                Phone

 

                    Haris Telles JR, MD AUTM                Unavailable

 

                    Tiny Arnett  AUTM                [phone]

 

                    Kandy Rosas MD    AUTM                [phone]

 

                    Ronan Romero MD AUTM                [phone]

 

                    Екатерина Ward   AUTM                [phone]

 

                    [place] [facility]  AUTM                [phone]







Problems





                    Active Problems     Provider            Date

 

                    Anticoagulants Long Term (Current) Use                     O

nset: 1997

 

                    Atrial fibrillation                     Onset: 1997

 

                    Contact dermatitis  CLARISSA Bajwa    Onset: 2011

 

                    Heart valve replacement CLARISSA Bajwa    Onset: 

3

 

                    Mitral valve disorder CLARISSA Bajwa    Onset: 2013

 

                    Essential hypertension JOON Robin Onset: 

3

 

                    Anticoagulant agent CLARISSA Bajwa    Onset: 07/15/2015

 

                    Chronic atrial fibrillation Haris Telles MD Onset: 

 

                    Hypothyroidism      Haris Telles MD Onset: 10/04/2017







Social History





                Type            Date            Description     Comments

 

                Birth Sex                       Unknown          

 

                Tobacco Use     Start: Unknown End: Unknown Patient is a former 

smoker  

 

                Exercise Type/Frequency                 Exercises rarely  







Allergies, Adverse Reactions, Alerts





             Active Allergies Reaction     Severity     Comments     Date

 

             Codeine,PCN                                         2010

 

             Thorazine                                           2016







Medications





           Active Medications SIG        Qnty       Indications Ordering Provide

r Date

 

                          Xopenex                     0.63mg/3ML Nebulizer      

             use four 

times daily prn for wheezing and shortness of breath. DX J44.9 36ml             

                       Екатерина Nguyen [facility]                               10/26/2020

 

                                        Culturelle Digestive Health Probiotic   

                   Capsules             

                one twice daily while on antibiotics or if having diarrhea 30cap

s                          Екатерина Nguyen [facility]                               10/23/2020

 

                                        Vitamin D3 Ultra Strength               

      125mcg (5000 Ut) Capsules         

             1 by mouth every day 90caps                    Екатерина Nguyen [facility] 

 

                          Nebulizer Kit/Tubing/Mouthpiece                      K

it                   for 

use with nebulizer--use up to four times a day dx j44.9 1units                  

                Екатерина Nguyen 

[facility]                                     2019

 

                          Incruse Ellipta                     62.5mcg/Inh Aeroso

l                   inhale

one puff by mouth daily 3units                          Екатерина Nguyen [facility] 01/15/2

019

 

                          Levothyroxine Sodium                     25mcg Tablets

                   1 

tablet by mouth every day 90tabs                          Екатерина Nguyen [facility] 10/04

/2017

 

                          Aricept                     5mg Tablets               

    1 by mouth every day 

at bedtime      90tabs          G30.1           Sravan Franks M.D. 10/03/2017

 

                          Advair Diskus                     100-50mcg/Dose Aeros

ol                   

inhale one puff by mouth twice a day 180units                        Екатерина Nguyen [facility] 2017

 

                          Colace                     100mg Capsules             

      1 by mouth twice a 

day             180caps         K59.00          KEATON Akins JR 

017

 

                          Calcium 500+D                     500-200mg-Unit Table

ts                   1 by 

mouth three times a day 270tabs                         Haris Telles MD 

 

                          Duoneb                     0.5-2.5(3)mg/3ML Solution  

                 inhale 

the contents of one vial via nebulizer four times a day as needed for wheezing 
or shortness of breath 270ml           R06.02          Екатерина Nguyen [facility] 05/15/20

17

 

                          Torsemide                     100mg Tablets           

        1tablet by mouth 

daily           30tabs          R60.0           Екатерина Nguyen [facility] 05/15/2017

 

                          Tylenol                     325mg Capsules            

       2 by mouth every 

day as needed   180caps                         Екатерина Nguyen [facility] 2017

 

                                        Albuterol Sulfate                     (2

.5mg/3ML) 0.083% Nebulizer              

             q.i.d. prn via nebulizer dx J44.9 75ml                      Екатерина Nguyen [facility] 2016

 

                          Coumadin                     3mg Tablets              

     take 1 tablet by 

mouth once a day as directed 90tabs                          Екатерина Nguyen, [facility] 

 

                          Simvastatin                     40mg Tablets          

         take one tablet 

by mouth at bedtime 90tabs                          Екатерина Nguyen, [facility] 2015

 

                    Multi-Vitamins                      Tablets                 

  1 by mouth daily    

90tabs                                  Екатерина Nguyen, [facility]    2003

 

                          Spironolactone                     25mg Tablets       

            1 by mouth 

every day                                       Unknown         

 

                          Klor-Con M10                     10Meq Tablets ER     

              2 by mouth 

every day                                       Unknown         

 

                          Losartan Potassium                     25mg Tablets   

                1 by mouth

every day                                       Unknown         

 

                          Ciprofloxacin HCL                     500mg Tablets   

                1 tab by 

mouth twice daily                                 Unknown         

 

                          Metronidazole                     500mg Tablets       

            one by mouth 

three times a day for 10 days                                 Unknown         







Medications Administered in Office





           Medication SIG        Qnty       Indications Ordering Provider Date

 

                          Administration Of Flu Vaccine                      Inj

ection                    

                                                Екатерина Nguyen, [facility] 2019

 

                                        Immunization Adminstration,1 Vaccine/Tox

oid                      Injection      

                                                    Екатерина Nguyen, [facility] 2019

 

                          Administration Of Flu Vaccine                      Inj

ection                    

                                                Екатерина Nguyen, [facility] 10/19/2018

 

                          Administration Of Flu Vaccine                      Inj

ection                    

                                                Kelsea Adams, [facility] 10/03/2017

 

                          Administration Of Flu Vaccine                      Inj

ection                    

                                                Kelsea Adams, [facility] 10/13/2016

 

                          Administration Of Flu Vaccine                      Inj

ection                    

                                                Haris Telles MD 10/15/2015

 

                          Administration Of Flu Vaccine                      Inj

nonaion                    

                                                Haris Telles MD 10/23/2014

 

                          Administration Of Flu Vaccine                      Inj

nonaion                    

                                                Haris Telles MD 10/12/2012

 

                          Administration Of Flu Vaccine                      Inj

nonaion                    

                                                Haris Telles MD 10/11/2011

 

                          Administration Of Flu Vaccine                      Inj

nonaion                    

                                                Haris Telles MD 10/07/2010

 

                          Administration Of Flu Vaccine                      Inj

nonaion                    

                                                Haris Telles MD 10/08/2009

 

                          Administration Of Flu Vaccine                      Inj

ection                    

                                                Haris Telles MD 2008

 

                          Administration Of Flu Vaccine                      Inj

ection                    

                                                Haris Telles MD 10/18/2007

 

                Administration Of Zostavax                      Injection       

                                            

                          Haris Telles MD     2007

 

                          Administration Of Flu Vaccine                      Inj

ection                    

                                                JOON Robin 2006

 

                          Administration Of Flu Vaccine                      Inj

ection                    

                                                Haris Telles MD 10/04/2005

 

                          Administration Of Flu Vaccine                      Inj

ection                    

                                                CLARISSA Bajwa 10/19/2004

 

                          Administration Of Flu Vaccine                      Inj

ection                    

                                                Haris Telles MD 2003

 

                          Administration Of Flu Vaccine                      Inj

ection                    

                                                Екатерина Le [place], FNP 10/08/2002







Immunizations





             CPT Code     Status       Date         Vaccine      Lot #

 

             U-Flu        Given        10/05/2020   Influenza,Unspecified  

 

             93321        Given        2020   PPD          [id number]

 

                80095           Given           2019      Influenza Vaccin

e Quadrivalent Preser/Antibiotic Free Im 

Use                                     [id number]

 

             40159        Given        2019   Adacel- Tetanus Diphtheria P

ertussis (Age65 & Under) 

[id number]

 

             69394        Given        2019   Shingrix      

 

             45619        Given        2018   Shingrix      

 

                01556           Given           10/19/2018      Influenza Virus 

Vaccine, Quadrivalent (Cciiv4), Derived 

From Cell                               [id number]

 

                15406           Given           10/03/2017      Influenza Vaccin

e Quadrivalent Preser/Antibiotic Free Im 

Use                                     [id number]

 

                     Given        10/13/2016   Fluvirin Virus Vaccine 90921

01

 

                     Given        10/15/2015   Fluvirin Virus Vaccine 30424

01

 

             76663        Given        2015   Prevnar 13   [id number]

 

                     Given        10/23/2014   Fluvirin Virus Vaccine 87140

21

 

                     Given        10/12/2012   Fluvirin Virus Vaccine 36678

01

 

                     Given        10/11/2011   Fluvirin Virus Vaccine  

 

                     Given        10/11/2011   Fluvirin Virus Vaccine  

 

             68174        Given        10/07/2010   Influenza Virus Vaccine  

 

             94662        Given        2009   Pneumovax 23  

 

             33761        Given        10/08/2009   Influenza Virus Vaccine  

 

             34779        Given        2008   Influenza Virus Vaccine  

 

             72301        Given        10/18/2007   Influenza Virus Vaccine  

 

             81401        Given        2007   Zoster Vaccine  

 

             02242        Given        2006   Influenza Virus Vaccine  

 

             31097        Given        10/04/2005   Influenza Virus Vaccine  

 

             16375        Given        10/19/2004   Influenza Virus Vaccine  

 

             30263        Given        2003   Influenza Virus Vaccine  

 

             30514        Given        10/08/2002   Influenza Virus Vaccine  

 

             18099        Given        2001   Influenza Virus Vaccine  

 

             02377        Given        2001   Tetanus Toxoid  

 

             U-Pneum      Given        10/01/2000   Pneumococcal,Unspecified  

 

             05528        Given        10/21/1999   Influenza Virus Vaccine  

 

             90326        Given        10/25/1994   Influenza Virus Vaccine  







Vital Signs





                Date            Vital           Result          Comment

 

                10/29/2020 11:21am Heart Rate      82 /min          

 

                    Weight              163.00 lb            

 

                    O2 % BldC Oximetry  94 %                 

 

                09/10/2020  1:40pm BP Systolic     130 mmHg         

 

                    BP Diastolic        50 mmHg              

 

                    Heart Rate          56 /min              

 

                    Body Temperature    98.4 F             

 

                    Respiratory Rate    14 /min              

 

                    Height              65.5 inches         5'5.50"

 

                    Weight              147.00 lb            

 

                    O2 % BldC Oximetry  95 %                 

 

                    BMI (Body Mass Index) 24.1 kg/m2           







Results





        Test    Acquired Date Facility Test    Result  H/L     Range   Note

 

                    Laboratory test finding 2020          [address] [address] [phone] iSTAT Troponin 0.01 NG/ML Normal     0.00-0.08   

 

                    Istat Chem8+ Panel  2020          [facility]

nter

                                        [address] [place] [address] [phone] iSTAT HCT  40.0 %     Normal     38.0-51.0   

 

             iSTAT Glucose 89 mg/dL     Normal               

 

             iSTAT Sodium 138 mEq/L    Normal       136-145       

 

             iSTAT Potassium 3.7 mEq/L    Normal       3.5-5.1       

 

             iSTAT CA++   4.6 mg/dL    Normal       4.5-5.3       

 

             iSTAT Chloride 98 mEq/L     Normal               

 

             iSTAT Co2    32.0 MM/L    High         23.0-27.0     

 

             iSTAT BUN    26 mg/dL     Normal       8-26          

 

             iSTAT Creatinine 1.1 mg/dL    Normal       0.6-1.3       

 

                    Laboratory test finding 2020          [address] [address] [phone] Bedside Glucose 83 mg/dL   Normal           

 

                    CBC With Differential 2020          [address] [address] [phone] White Blood Count 8.5 10     Normal     4.0-10.0    

 

             Red Blood Count 4.35 10      Normal       4.00-5.40     

 

             Hemoglobin   13.0 g/dL    Normal       12.0-15.5     

 

             Hematocrit   40.6 %       Normal       36.0-47.0     

 

             Mean Corpuscular Volume 93.3 fl      Normal       80.0-96.0     

 

             Mean Corpuscular Hemoglobin 29.9 pg      Normal       27.0-33.0    

 

 

             Mean Corpuscular HGB Conc 32.0 g/dL    Normal       32.0-36.5     

 

             Red Cell Distribution Width 15.2 %       High         11.5-14.5    

 

 

             Platelet Count, Automated 194 10       Normal       150-450       

 

             Neutrophils % 65.5 %       Normal       36.0-66.0     

 

             Lymph %      12.8 %       Low          24.0-44.0     

 

             Mono %       18.0 %       High         0.0-5.0       

 

             Eos %        2.2 %        Normal       0.0-3.0       

 

             Baso %       1.1 %        High         0.0-1.0       

 

             Immature Granulocyte % 0.4 %        Normal       0-3.0         

 

             Nucleated Red Blood Cell % 0.0 %        Normal       0-0           

 

             Neutrophils # 5.5 10       Normal       1.5-8.5       

 

             Lymph #      1.1 10       Low          1.5-5.0       

 

             Mono #       1.5 10       High         0.0-0.8       

 

             Eos #        0.2 10       Normal       0.0-0.5       

 

             Baso #       0.1 10       Normal       0.0-0.2       

 

                    Laboratory test finding 2020          Health system

                                        [address] [place] [address] [phone] Thyroid Stimulating Hormone 5.120 uIU/ML High       0.

358-3.740  

 

                    Prothrombin Time/Inr 2020          [facility]

enter

                                        [address] [place] [address] [phone] Prothrombin Time 26.5 seconds High       12.5-14.3   

 

             Inr          2.38         Normal                    1

 

                    Complete Blood Count 2020          [facility]

enter

                                        [address] [phone] White Blood Count 7.8 10     Normal     4.0-10.0    

 

             Red Blood Count 4.46 10      Normal       4.00-5.40     

 

             Hemoglobin   13.3 g/dL    Normal       12.0-15.5     

 

             Hematocrit   41.6 %       Normal       36.0-47.0     

 

             Mean Corpuscular Volume 93.3 fl      Normal       80.0-96.0     

 

             Mean Corpuscular Hemoglobin 29.8 pg      Normal       27.0-33.0    

 

 

             Mean Corpuscular HGB Conc 32.0 g/dL    Normal       32.0-36.5     

 

             Red Cell Distribution Width 15.9 %       High         11.5-14.5    

 

 

             Platelet Count, Automated 231 10       Normal       150-450       

 

             Nucleated Red Blood Cell % 0.0 %        Normal       0-0           

 

                    Comprehensive Metabolic Profil 2020          [address] [id number] [phone] Glucose, Fasting 99 mg/dL   Normal           

 

             Blood Urea Nitrogen 21 mg/dL     High         7-18          

 

             Creatinine For GFR 1.07 mg/dL   Normal       0.55-1.30     

 

             Glomerular Filtration Rate 52.1         Normal       >32          2

 

             Sodium Level 136 mEq/L    Normal       136-145       

 

             Potassium Serum 4.2 mEq/L    Normal       3.5-5.1       

 

             Chloride Level 95 mEq/L     Low                  

 

             Carbon Dioxide Level 35 mEq/L     High         21-32         

 

             Anion Gap    6 mEq/L      Low          8-16          

 

             Calcium Level 10.1 mg/dL   Normal       8.8-10.2      

 

             Ast/Sgot     32 U/L       Normal       7-37          

 

             Alt/SGPT     23 U/L       Normal       12-78         

 

             Alkaline Phosphatase 66 U/L       Normal               

 

             Bilirubin,Total 0.7 mg/dL    Normal       0.2-1.0       

 

             Total Protein 6.6 GM/DL    Normal       6.4-8.2       

 

             Albumin      3.7 GM/DL    Normal       3.2-5.2       

 

             Albumin/Globulin Ratio 1.3          Normal       1.2-2.2       

 

                    Type & Screen -Incl Blood Type,Walker,AB SC 10/20/2020         

 [address] [address] [phone] Blood Type A POSITIVE  Normal                 

 

             AB Screen (Indirect Anatoly)Vis NEGATIVE     Normal                 

    

 

                    Laboratory test finding 10/20/2020          [address] [address] [phone] Lipase     157 U/L    Normal           

 

             Thyroid Stimulating Hormone 3.480 uIU/ML Normal       0.358-3.740  

 

 

                    Liver Profile       10/20/2020          [facility]

nter

                                        [address] [place] [address] [phone] Ast/Sgot   23 U/L     Normal     7-37        

 

             Alt/SGPT     24 U/L       Normal       12-78         

 

             Alkaline Phosphatase 72 U/L       Normal               

 

             Bilirubin,Total 1.2 mg/dL    High         0.2-1.0       

 

             Bilirubin,Direct 0.4 mg/dL    High         0.0-0.2       

 

             Total Protein 6.7 GM/DL    Normal       6.4-8.2       

 

             Albumin      3.7 GM/DL    Normal       3.2-5.2       

 

             Albumin/Globulin Ratio 1.2          Normal       1.2-2.2       

 

                    Cardiac Marker Panel 10/20/2020          [facility]

enter

                                        [address] [place] [address] [phone] CPK Creatine Phosphokinase 62 U/L     Normal     26-19

2      

 

             CK-MB Value Mass < 1.0 NG/ML  Normal       <3.6          

 

             MB/CK Relative Index 1.61         Normal       < Or =4      3

 

             Troponin I   < 0.02 NG/ML Normal       < 0.10       4

 

                    Ua W/ Reflex To Culture 10/20/2020          [address] [address] [phone] Appearance, Urine RFX CLEAR      Normal     Clear     

  

 

             Color, Urine RFX YELLOW       Normal       Yellow        

 

             PH,Urine RFX 6.0 units    Normal       5.0-9.0       

 

             Specific Gravity Ur Auto RFX 1.012        Normal       1.002-1.035 

  

 

             Protein, Urine Auto RFX NEGATIVE mg/dL Normal       Negative      

 

             Glucose, Urine (Ua) Auto RFX NEGATIVE mg/dL Normal       Negative  

    

 

             Ketone, Urine Auto RFX NEGATIVE mg/dL Normal       Negative      

 

             Urobilinogen, Urine Auto RFX 0.2 mg/dL    Normal       0.0-2.0     

  

 

             Bilirubin, Urine Auto RFX NEGATIVE     Normal       Negative      

 

             Nitrite, Urine Auto RFX NEGATIVE     Normal       Negative      

 

             Leukocyte Esterase Ur Auto RFX NEGATIVE     Normal       Negative  

    

 

             Blood, Urine Blood RFX NEGATIVE     Normal       Negative      

 

             WBC, Urine Auto RFX 1 /HPF       Normal       0-3           

 

             RBC, Urine Auto RFX 1 /HPF       Normal       0-3           

 

             Bacteria, Urine Auto RFX NEGATIVE     Normal       Negative      

 

             Squam Epithelial Cell Ur Aurfx 0 /HPF       Normal       0-6       

    

 

             Hyaline Cast, Urine Auto RFX 0 /LPF       Normal       0-1         

  

 

                    Laboratory test finding 10/20/2020          [address] [address] [phone] Ammonia    < 10 uMOL/L Normal     <32         

 

                    CBC With Differential 10/20/2020          [address] [address] [phone] White Blood Count 14.2 10    High       4.0-10.0    

 

             Red Blood Count 4.35 10      Normal       4.00-5.40     

 

             Hemoglobin   13.1 g/dL    Normal       12.0-15.5     

 

             Hematocrit   40.2 %       Normal       36.0-47.0     

 

             Mean Corpuscular Volume 92.4 fl      Normal       80.0-96.0     

 

             Mean Corpuscular Hemoglobin 30.1 pg      Normal       27.0-33.0    

 

 

             Mean Corpuscular HGB Conc 32.6 g/dL    Normal       32.0-36.5     

 

             Red Cell Distribution Width 15.3 %       High         11.5-14.5    

 

 

             Platelet Count, Automated 178 10       Normal       150-450       

 

             Neutrophils % 75.0 %       High         36.0-66.0     

 

             Lymph %      5.6 %        Low          24.0-44.0     

 

             Mono %       17.9 %       High         0.0-5.0       

 

             Eos %        0.5 %        Normal       0.0-3.0       

 

             Baso %       0.4 %        Normal       0.0-1.0       

 

             Immature Granulocyte % 0.6 %        Normal       0-3.0         

 

             Nucleated Red Blood Cell % 0.0 %        Normal       0-0           

 

             Neutrophils # 10.7 10      High         1.5-8.5       

 

             Lymph #      0.8 10       Low          1.5-5.0       

 

             Mono #       2.5 10       High         0.0-0.8       

 

             Eos #        0.1 10       Normal       0.0-0.5       

 

             Baso #       0.1 10       Normal       0.0-0.2       

 

                    Istat PT/Inr        10/20/2020          [facility]

nter

                                        [address] [place], [place] [id number] [phone] iSTAT Protime Seconds 31.3 seconds High       12.1-14.

4   

 

             iSTAT Inr    2.7          Normal                     

 

                    Istat Chem8+ Panel  10/20/2020          [facility]

nter

                                        [address] [place], [place] [id number] [phone] iSTAT HCT  41.0 %     Normal     38.0-51.0   

 

             iSTAT Glucose 107 mg/dL    High                 

 

             iSTAT Sodium 136 mEq/L    Normal       136-145       

 

             iSTAT Potassium 3.9 mEq/L    Normal       3.5-5.1       

 

             iSTAT CA++   5.1 mg/dL    Normal       4.5-5.3       

 

             iSTAT Chloride 95 mEq/L     Low                  

 

             iSTAT Co2    30.0 MM/L    High         23.0-27.0     

 

             iSTAT BUN    30 mg/dL     High         8-26          

 

             iSTAT Creatinine 1.2 mg/dL    Normal       0.6-1.3       

 

                    Laboratory test finding 10/20/2020          [address] [id number] [phone] iSTAT Lactate 0.90       Normal     0.4-2.0     

 

                    Laboratory test finding 10/20/2020          [address] [id number] [phone] iSTAT Troponin 0.02 NG/ML Normal     0.00-0.08   

 

                    Complete Blood Count 09/10/2020          [facility]

[facility], [facility]

: Dr Haris Telles

[place] [address] [phone] WBC        7.4 x10*3/UL            4.1 - 10.9  

 

             RBC          5.02 x10*6/UL              4.20 - 6.30   

 

             Hemoglobin   14.8 g/dL                 12.0 - 18.0   

 

             Hematocrit   44.4 %                    37.0 - 51.0   

 

             MCV          88.5 fL                   80.0 - 97.0   

 

             MCH          29.4 pg                   26.0 - 32.0   

 

             MCHC         33.3 g/dL                 31.0 - 38.0   

 

             RDW          14.3 %       High         11.6 - 13.7   

 

             PLT          208 x10*3/UL              140 - 440     

 

             MPV          8.8 FL                    7.8 - 11.0    

 

             Lymph %      16.7 %                    10.0 - 58.5   

 

             Mid %        4.6 %                     1.7 - 9.3     

 

             Neut %       78.7 %                    37.0 - 92.0   

 

             Lymph #      1.2 x10*3/UL              0.6 - 4.1     

 

             Mid #        0.4 x10*3/UL              0.1 - 0.6     

 

             Neut #       5.8 x10*3/UL              2.0 - 7.8     

 

                    Basic Metabolic Panel 09/10/2020          [facility]

[facility]

: Dr Haris Telles

[place] [address] [phone] Glucose    100 mg/dL  High       74 - 99    5

 

             BUN          24 mg/dL     High         7 - 18        

 

             Creatinine   1.4 mg/dL    High         0.6 - 1.3     

 

             Sodium       139 mEq/L                 136 - 145     

 

             Potassium    4.3 mEq/L                 3.5 - 5.1     

 

             Chloride     99 mEq/L                  98 - 107      

 

             Carbon Dioxide 36 mEq/L     High         21 - 32       

 

             Calcium      9.9 mg/dL                 8.5 - 10.1    

 

             GFR  36 mL/min    Low          >60           

 

             GFR African American 44 mL/min    Low          >60          6

 

                    Laboratory test finding 09/10/2020          [facility]

[facility]

: Dr Haris Telles

[place] [address] [phone] Thyroid Stimulating Hormone 3.19 uIU/mL            0.3

6 - 3.74  

 

                    Laboratory test finding 2020          [facility]

             Inr          3.1                                     

 

                    Laboratory test finding 2020          [facility]

             Inr          2.5                                     

 

                    Complete Blood Count 06/10/2020          [facility]

[facility], [facility]

: Dr Haris Telles

[place] [address] [phone] WBC        8.1 x10*3/UL            4.1 - 10.9  

 

             RBC          5.07 x10*6/UL              4.20 - 6.30   

 

             Hemoglobin   14.8 g/dL                 12.0 - 18.0   

 

             Hematocrit   44.9 %                    37.0 - 51.0   

 

             MCV          88.5 fL                   80.0 - 97.0   

 

             MCH          29.3 pg                   26.0 - 32.0   

 

             MCHC         33.1 g/dL                 31.0 - 38.0   

 

             RDW          14.2 %       High         11.6 - 13.7   

 

             PLT          206 x10*3/UL              140 - 440     

 

             MPV          8.7 FL                    7.8 - 11.0    

 

             Lymph %      16.9 %                    10.0 - 58.5   

 

             Mid %        4.7 %                     1.7 - 9.3     

 

             Neut %       78.4 %                    37.0 - 92.0   

 

             Lymph #      1.3 x10*3/UL              0.6 - 4.1     

 

             Mid #        0.5 x10*3/UL              0.1 - 0.6     

 

             Neut #       6.3 x10*3/UL              2.0 - 7.8     

 

                    Basic Metabolic Panel 06/10/2020          [facility]

[facility]

: [place] [address] [phone] Glucose    76 mg/dL              74 - 99    7

 

             BUN          22 mg/dL     High         7 - 18        

 

             Creatinine   1.2 mg/dL                 0.6 - 1.3     

 

             Sodium       144 mEq/L                 136 - 145     

 

             Potassium    4.2 mEq/L                 3.5 - 5.1     

 

             Chloride     104 mEq/L                 98 - 107      

 

             Carbon Dioxide 33 mEq/L     High         21 - 32       

 

             Calcium      9.7 mg/dL                 8.5 - 10.1    

 

             GFR  43 mL/min    Low          >60           

 

             GFR African American 52 mL/min    Low          >60          8

 

                    Laboratory test finding 06/10/2020          [facility]

: Dr Haris Telles

[place] [address] [phone] Magnesium  2.1 mg/dL             1.8 - 2.4   







                          1                         THERAPUTIC HUMAN INR VALUES

INDICATIONS                      NORMAL RANGES

PROPHYLAXIS/TREATMENT OF:

VENOUS THROMBOSIS                2.0-3.0

PULMONARY EMBOLISM               2.0-3.0

PREVENTION OF SYSTEMIC EMBOLISM FROM:

TISSUE HEART VALVES              2.0-3.0

ACUTE MYOCARDIAL INFARCTION      2.0-3.0

VALVULAR HEART DISEASE           2.0-3.0

ATRIAL FIBRILLATION              2.0-3.0

MECHANICAL VALVES(HIGH RISK)     2.5-3.5

RECURRENT MYOCARDIAL INFARCTION  2.5-3.5



 

                          2                         Units are mL/min/1.73 m2



Chronic Kidney Disease Staging per NKF:



Stage I & II   GFR >=60       Normal to Mildly Decreased

Stage III      GFR 30-59      Moderately Decreased

Stage IV       GFR 15-29      Severely Decreased

Stage V        GFR <15        Very Little GFR Left

ESRD           GFR <15 on RRT



 

                          3                         DIAGNOSIS CRITERIA

MMB ng/ml       Relative Index (RI)

NON-AMI               < or = 5               N/A

GRAY ZONE              > 5                < or = 4

AMI                    > 5                   > 4



 

                          4                         Troponin I Reference Interva

l for Siemens Vista LOCI:



                                        99th Percentile= 0.00-0.045 ng/ml



Risk Stratification:

<= 0.10 ng/ml   Decreased Risk for Adverse Clinical

Events.

                                        0.10-1.50 ng/ml   Increased Risk for Adv

erse Clinical

Events. Evaluation of additional

criterion and/or repeat testing in 2-6

hours is suggested to rule out myocardial

damage.

>= 1.50 ng/ml   Indicative of Myocardial Injury.



 

                          5                         100-125 mg/dL     PRE-DIABET

ES/FASTING

>126 mg/dL          DIABETES/FASTING



 

                          6                         CHRONIC KIDNEY DISEASE STAGI

NG PER NKF



STAGE I & II      GFR >= 60        NORMAL TO MILDLY DECREASED

STAGE III          GFR 30-59          MODERATELY DECREASED

STAGE IV           GFR 15-29         SEVERELY DECREASED

STAGE V            GFR <15            VERY LITTLE GFR LEFT

ESRD                 GFR <15            ON RRT



 

                          7                         100-125 mg/dL     PRE-DIABET

ES/FASTING

>126 mg/dL          DIABETES/FASTING



 

                          8                         CHRONIC KIDNEY DISEASE STAGI

NG PER NKF



STAGE I & II      GFR >= 60        NORMAL TO MILDLY DECREASED

STAGE III          GFR 30-59          MODERATELY DECREASED

STAGE IV           GFR 15-29         SEVERELY DECREASED

STAGE V            GFR <15            VERY LITTLE GFR LEFT

ESRD                 GFR <15            ON RRT









Procedures





                Date            Code            Description     Status

 

                2018      [id number]       Diabetic Foot Exam Completed

 

                2017      [id number]        Mammogram       Completed

 

                01/15/2016      [id number]        Mammogram       Completed

 

                2015      [id number]        Mammogram       Completed

 

                2014      [id number]        Mammogram       Completed

 

                2013      [id number]       Diabetic Retinal Eye Exam Comple

zeke

 

                2013      [id number]       Bone Mineral Density Test Comple

zeke

 

                01/10/2013      [id number]        Mammogram       Completed

 

                2012      [id number]        Mammogram       Completed

 

                06/10/2011      [id number]       Bone Mineral Density Test Comple

zeke

 

                2011      [id number]        Mammogram       Completed

 

                2009      [id number]        Mammogram       Completed

 

                2008      [id number]       Bone Mineral Density Test Comple

zeke

 

                10/22/2004      [id number]        Colonoscopy     Completed







Medical Devices





                                        Description

 

                                        No Information Available







Encounters





           Type       Date       Location   Provider   Dx         Diagnosis

 

             Office Visit 10/29/2020 11:20a [facility] Екатерина Lemus

ne, FNP 

K57.92                                  Dvtrcli of intest, part unsp, w/o perf o

r abscess w/o bleed

 

                          I50.41                    Acute combined systolic and 

diastolic (congestive) hrt fail

 

                          I13.0                     Hyp hrt & chr kdny dis w hrt

 fail and stg 1-4/unsp chr kdny

 

                          N18.31                    Chronic kidney disease, stag

e 3a

 

                          J44.9                     Chronic obstructive pulmonar

y disease, unspecified

 

                          I48.21                    Permanent atrial fibrillatio

n

 

                          Z95.2                     Presence of prosthetic heart

 valve

 

                          Z79.01                    Long term (current) use of a

nticoagulants

 

                          R26.89                    Other abnormalities of gait 

and mobility

 

             Office Visit 09/10/2020  1:40p [facility] Екатерина Lemus

ne, FNP I13.0

                                        Hyp hrt & chr kdny dis w hrt fail and st

g 1-4/unsp chr kdny

 

                          I50.42                    Chronic combined systolic an

d diastolic hrt fail

 

                          N18.3                     Chronic kidney disease, stag

e 3 (moderate)

 

                          J44.9                     Chronic obstructive pulmonar

y disease, unspecified

 

                          G30.1                     Alzheimer's disease with lat

e onset

 

                          F02.80                    Dementia in oth diseases [facility] el[facility]hr w/o behavrl disturb

 

                          I48.21                    Permanent atrial fibrillatio

n

 

                          Z79.01                    Long term (current) use of a

nticoagulants

 

                          Z95.2                     Presence of prosthetic heart

 valve

 

                          I87.2                     Venous insufficiency (chroni

c) (peripheral)

 

             Office Visit 06/10/2020  2:00p [facility] Екатерина Lemus

ne, [facility] I13.0

                                        Hyp hrt & chr kdny dis w hrt fail and st

g 1-4/unsp chr kdny

 

                          I50.42                    Chronic combined systolic an

d diastolic hrt fail

 

                          N18.3                     Chronic kidney disease, stag

e 3 (moderate)

 

                          J44.9                     Chronic obstructive pulmonar

y disease, unspecified

 

                          G30.1                     Alzheimer's disease with lat

e onset

 

                          F02.80                    Dementia in oth diseases [facility] el[facility]hr w/o behavrl disturb

 

                          I48.21                    Permanent atrial fibrillatio

n

 

                          Z79.01                    Long term (current) use of a

nticoagulants

 

                          Z95.2                     Presence of prosthetic heart

 valve

 

                          Z13.89                    Encounter for screening for 

other disorder







Assessments





                Date            Code            Description     Provider

 

                    10/29/2020          K57.92              Diverticulitis of in

testine, part unspecified, without 

perforation or abscess without bleeding Екатерина Nguyen [facility]

 

                    10/29/2020          I50.41              Acute combined systo

lic (congestive) and diastolic 

(congestive) heart failure              CLARISSA Bajwa

 

                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

idney disease with heart lakshmi 

Екатерина Nguyen [facility]

 

                10/29/2020      N18.31          Chronic kidney disease, stage 3a

 DIOGO Bajwa

 

                10/29/2020      J44.9           Chronic obstructive pulmonary di

sease, unspecified Екатерина Nguyen

[facility]

 

                10/29/2020      I48.21          Permanent atrial fibrillation An

marino Nguyen [facility]

 

                10/29/2020      Z95.2           Presence of prosthetic heart zakia

ve DIOGO Bajwa

 

                10/29/2020      Z79.01          Long term (current) use of antic

oagulants Екатерина Nguyen [facility]

 

                10/29/2020      R26.89          Other abnormalities of gait and 

mobility Екатерина Nguyen [facility]

 

                10/15/2020      J44.9           Chronic obstructive pulmonary di

sease, unspecified Haris Telles MD

 

                10/15/2020      I48.21          Permanent atrial fibrillation Co

amanda Telles MD

 

                10/15/2020      I10             Essential (primary) hypertension

 Haris Telles MD

 

                10/15/2020      G30.1           Alzheimer's disease with late on

set Haris Telles MD

 

                2020      J44.9           Chronic obstructive pulmonary di

sease, unspecified Haris Telles MD

 

                2020      G30.1           Alzheimer's disease with late on

set Haris Telles MD

 

                2020      I48.21          Permanent atrial fibrillation Co

amanda Telles MD

 

                2020      I10             Essential (primary) hypertension

 Haris Telles MD

 

                09/10/2020      I13.0           Hypertensive heart and chronic k

idney disease with heart lakshmi 

Екатерина Nguyen [facility]

 

                    09/10/2020          I50.42              Chronic combined sys

tolic (congestive) and diastolic 

(congestive) heart failure              Екатерина Nguyen [facility]

 

                09/10/2020      N18.3           Chronic kidney disease, stage 3 

(moderate) Екатерина Nguyen [facility]

 

                09/10/2020      J44.9           Chronic obstructive pulmonary di

sease, unspecified Екатерина Nguyen

[facility]

 

                09/10/2020      G30.1           Alzheimer's disease with late on

set Екатерина Nguyen [facility]

 

                    09/10/2020          F02.80              Dementia in other di

seases classified elsewhere without 

behavioral disturbance                  Екатерина Nguyen [facility]

 

                09/10/2020      I48.21          Permanent atrial fibrillation An

n Radha Goncalves [facility]

 

                09/10/2020      Z79.01          Long term (current) use of antic

oagulants Екатерина Nguyen [facility]

 

                09/10/2020      Z95.2           Presence of prosthetic heart zakia

ve Екатерина Nguyen [facility]

 

                09/10/2020      I87.2           Venous insufficiency (chronic) (

peripheral) Екатерина Nguyen [facility]

 

                2020      Z11.1           Encounter for screening for resp

iratory tuberculosis Haris Telles MD

 

                2020      I48.21          Permanent atrial fibrillation An

n Radha Goncalves [facility]

 

                2020      I48.21          Permanent atrial fibrillation Pr

otime

 

                2020      Z51.81          Encounter for therapeutic drug l

evel monitoring Екатерина Radha Goncalves, 

[facility]

 

                2020      Z51.81          Encounter for therapeutic drug l

evel monitoring Protime

 

                2020      Z79.01          Long term (current) use of antic

oagulants Екатерина Radha Goncalves, [facility]

 

                2020      Z79.01          Long term (current) use of antic

oagulants Protime

 

                2020      I48.21          Permanent atrial fibrillation An

n Radha Goncalves, [facility]

 

                2020      I48.21          Permanent atrial fibrillation Pr

otime

 

                2020      Z51.81          Encounter for therapeutic drug l

evel monitoring Екатерина Nguyen, 

[facility]

 

                2020      Z79.01          Long term (current) use of antic

oagulants Екатерина Nguyen, [facility]

 

                2020      I10             Essential (primary) hypertension

 Haris Telles MD

 

                2020      N18.3           Chronic kidney disease, stage 3 

(moderate) Haris Telles MD

 

                2020      J44.9           Chronic obstructive pulmonary di

sease, unspecified Haris Telles MD

 

                2020      G30.1           Alzheimer's disease with late on

set Haris Telles MD

 

                2020      N18.3           Chronic kidney disease, stage 3 

(moderate) Haris Telles MD

 

                2020      J44.9           Chronic obstructive pulmonary di

sease, unspecified Haris Telles MD

 

                2020      G30.1           Alzheimer's disease with late on

set Haris Telles MD

 

                2020      I10             Essential (primary) hypertension

 Haris Telles MD

 

                06/10/2020      I13.0           Hypertensive heart and chronic k

idney disease with heart lakshmi 

Екатерина Nguyen, [facility]

 

                    06/10/2020          I50.42              Chronic combined sys

tolic (congestive) and diastolic 

(congestive) heart failure              Екатерина Nguyen, [facility]

 

                06/10/2020      N18.3           Chronic kidney disease, stage 3 

(moderate) Екатерина Nguyen [facility]

 

                06/10/2020      J44.9           Chronic obstructive pulmonary di

sease, unspecified Екатерина Nguyen,

[facility]

 

                06/10/2020      G30.1           Alzheimer's disease with late on

set Екатерина Nguyen [facility]

 

                    06/10/2020          F02.80              Dementia in other di

seases classified elsewhere without 

behavioral disturbance                  Екатерина Nguyen [facility]

 

                06/10/2020      I48.21          Permanent atrial fibrillation An

n Radha Goncalves [facility]

 

                06/10/2020      Z79.01          Long term (current) use of antic

oagulants Екатерина Nguyen [facility]

 

                06/10/2020      Z95.2           Presence of prosthetic heart zakia

ve Екатерина Nguyen [facility]

 

                06/10/2020      Z13.89          Encounter for screening for othe

r disorder Екатерина Nguyen [facility]

 

                2020      N18.3           Chronic kidney disease, stage 3 

(moderate) Haris Telles MD

 

                2020      J44.9           Chronic obstructive pulmonary di

sease, unspecified Haris Telles MD

 

                2020      I10             Essential (primary) hypertension

 Haris Telles MD

 

                2020      E66.3           Overweight      Haris srinivasan MD







Plan of Treatment

Future Appointment(s):* 2020  3:20 pm - CLARISSA Bajwa at [facility]

10/29/2020 - CLARISSA Bajwa* K57.92 Diverticulitis of intestine, part 
  unspecified, without perforation or abscess without bleeding

* I50.41 Acute combined systolic (congestive) and diastolic (congestive) heart 
  failure* New Orders:* CBC and CMP, Scheduled: 20



* Comments:* will increase Torsemide to 100mg daily.will arrange for [facility] to evaluate.





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
  * Blood pressure is controlled on current treatment plan.





* N18.31 Chronic kidney disease, stage 3a

* J44.9 Chronic obstructive pulmonary disease, unspecified* Comments:* will 
  request BMP in one week.





* I48.21 Permanent atrial fibrillation* Comments:* Rate controlled.





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

* Z79.01 Long term (current) use of anticoagulants* Comments:* INR completed at 
  home.  Has been supratherapeutic due to interaction of Warfarin and 
  Metronidazole.  Verbal  instructions given. Signs and symptoms to report re
  viewed.  No evidence of thromboembolic or bleeding events.





* R26.89 Other abnormalities of gait and mobility* New Orders:* Physical 
  Therapy, Ordered: 10/29/20



* Comments:* will request home physical therapy.





* All * Comments:* Will need MARITZA.Tried to discuss [facility] and advance 
  directives.Booklet "hard choices for loving people" given.









Functional Status





                                        Description

 

                                        No Information Available







Mental Status





                                        Description

 

                                        No Information Available







Referrals





                                        Description

 

                                        No Information Available

## 2021-01-23 NOTE — IPNPDOC
Date Seen


The patient was seen on 1/23/21.





Progress Note


Subjective:


woke up in the early morning confused wanting to take peripheral iv out, and 


c/o chest pain which resolved w tramadol. daughter re-directed patient, and


went back to sleep. denied diaphoresis, feeling of impending doom, epigastric


pain, nausea, or sob or painradiating up the jaw down the left arm.





Objective:


PHYSICAL EXAMINATION:


vitals: see below


GEN: asleep snoring, but arousable. confused. aaoto person only


HEENT: superficial laceration on left forehead and left neck, clean


w/o erythema or tenderness


EOMI, no cervical LAD or thyromegaly . face is symmetric


Lungs: CTAB


Heart: S1S2 irregularly irregular


Abd: +bs soft groin tender w sutures intact no purulence or bloody


discharge left anterior thigh 2x 2 cm laceration w intact sutures


w/o drainage,erythema, crepitus or induration


ext: no cyanosis or clubbing





LABORATORY DATA, IMAGING STUDIES, MICROBIOLOGY: SEE BELOW





ASSESSMENT AND PLAN: 


84 y/o F lives w her  at [facility] living fell in her bedroom be

tween the two twin


 beds when she got up in the middle of the night to go to the bathroom to 

urinate, scraping her face, 


neck, and groin on the bed frame, needing sutures in the ER for the groin lace

ration. Pt denied


any prodromal symptoms such as chest pain, pressure, tightness, dizziness, 

lightheadedness,


cough, nausea, vomiting,diaphoresis. Family requesting SNF placement due to 

worsening


dementia and gait imbalance. She otherwise denies any infectious symptoms such 

as fever,


chills, dysuria, urgency, frequency, diarrhea, abd pain. no other c/o.Pt has 

been admitted as


an inpatient s/p  mechanical fall w groin laceration requiring sutures, for 

placement 


due toworsening dementia and gait imbalance per familyrequest. 





Mechanical Fall


Traumatic Injury


Face, neck, left anterior thigh laceration


left 2x2cm anterior thigh laceration requiring sutures


musculoskeletal chest pain


gait imbalance


Alzheimer's Dementia.


 HTN.


 A-Fib.


 Diastolic Heart Failure.


COPD


Mechanical Mitral Valve 


 CKD Stage 3





PLAN:


s/p sutures by ER MD Dr. Seay to be left intact for two weeks. 


topical bacitracin for open lacerations.


resumed on home meds. prn tramadol and tylenolfor pain


avoid sedatives or hypnotics due to increased risk of acute delirium on 


baseline dementia. pt's daughters will be taking turns providing 24 /7 


supervision during her inpt stay. 


pfs consulted to assist in snf placement. no other acute medical issues.





VS, I&O, 24H, Fishbone


Vital Signs/I&O





Vital Signs








  Date Time  Temp Pulse Resp B/P (MAP) Pulse Ox O2 Delivery O2 Flow Rate FiO2


 


1/23/21 06:00 97.4 62 14 142/56 (84) 93 Room Air  














I&O- Last 24 Hours up to 6 AM 


 


 1/23/21





 06:00


 


Intake Total 300 ml


 


Output Total 0 ml


 


Balance 300 ml











Laboratory Data


24H LABS


Laboratory Tests 2


1/22/21 10:12: 


Coronavirus (COVID-19)(PCR) NEGATIVE, Influenza Type A (RT-PCR) NEGATIVE, Inf

luenza Type B (RT-PCR) NEGATIVE, Respiratory Syncytial Virus (PCR) NEGATIVE


1/23/21 05:41: 


Nucleated Red Blood Cells % (auto) 0.0, Prothrombin Time 26.9H, Prothromb Time 

International Ratio 2.42, Anion Gap 5L, Glomerular Filtration Rate 59.1, Calcium

Level 9.5


CBC/BMP


Laboratory Tests


1/23/21 05:41

















DASH BRAR MD            Jan 23, 2021 08:08

## 2021-01-24 NOTE — IPNPDOC
Date Seen


The patient was seen on 1/24/21.





Progress Note


Subjective:


pulled out her iv yesterday. re-directed by daughter at the bedside.


right anterior thigh dressing dislodged, but daughter said mom was not


scratching it. no new issues overnight. denied any pain .


per daughter pt was lethargic most of the day yesterday after one


dose of tramadol. preferred tylenol for pain. 





Objective:


PHYSICAL EXAMINATION:


vitals: see below


GEN: taking her pills w rn and daughter at the bedside no distress


pleasant aaox2


HEENT: superficial laceration on left forehead and left neck, clean


w/o erythema or tenderness


EOMI, no cervical LAD or thyromegaly . face is symmetric


Lungs: CTAB


Heart: S1S2 irregularly irregular


Abd: +bs soft groin tender w sutures intact no purulence or bloody


discharge left anterior thigh 2x 2 cm laceration w intact sutures


w/o drainage,erythema, crepitus or induration


ext: no cyanosis or clubbing





LABORATORY DATA, IMAGING STUDIES, MICROBIOLOGY: SEE BELOW





ASSESSMENT AND PLAN: 


86 y/o F lives w her  at [facility] living fell in her bedroom 

between the two twin


 beds when she got up in the middle of the night to go to the bathroom to 

urinate, scraping her face, 


neck, and groin on the bed frame, needing sutures in the ER for the groin 

laceration. Pt denied


any prodromal symptoms such as chest pain, pressure, tightness, dizziness, 

lightheadedness,


cough, nausea, vomiting,diaphoresis. Family requesting SNF placement due to 

worsening


dementia and gait imbalance. She otherwise denies any infectious symptoms such 

as fever,


chills, dysuria, urgency, frequency, diarrhea, abd pain. no other c/o.Pt has 

been admitted as


an inpatient s/p  mechanical fall w groin laceration requiring sutures, for 

placement 


due toworsening dementia and gait imbalance per familyrequest. 





Mechanical Fall


Traumatic Injury


Face, neck, left anterior thigh laceration


left 2x2cm anterior thigh laceration requiring sutures


musculoskeletal chest pain,resolved


sundowning


gait imbalance


Alzheimer's Dementia.


 HTN.


 A-Fib.


 Diastolic Heart Failure.


COPD


Mechanical Mitral Valve 


 CKD Stage 3





PLAN:


despite sundowning, pt was re-directable and has  not needed any sedatives which


we are trying to avoid. daughter at the bedside is a teacher and will be 

returning to work


tomorrow, but hte otherdaughter willbe available during the day and both are 

ableto provide


24/7care while she is in the hospital, but requesting snf placement due to pt 

requiring 2person


max assist getting to bedside commode. continue supportive care w prn aceta

minophen for pain


assisted ambulation. therapeutic inr on warfarin dose. no other acute issues. 

medically stable for dc


to snf anytime. pfs to help expedite [facility] application. covid 19 negative.sut

ures to remain


in place for ten days per dr. hoffmann,ANGEL cole who placed sutures. topical bacitracin 

to superficial


lacerations w/o signs of cellulitis or abscess.





VS, I&O, 24H, Fishbone


Vital Signs/I&O





Vital Signs








  Date Time  Temp Pulse Resp B/P (MAP) Pulse Ox O2 Delivery O2 Flow Rate FiO2


 


1/24/21 06:00 97.2 61 16 131/56 (81) 95 Room Air  














I&O- Last 24 Hours up to 6 AM 


 


 1/24/21





 06:00


 


Intake Total 770 ml


 


Output Total 0 ml


 


Balance 770 ml











Laboratory Data


24H LABS


Laboratory Tests 2


1/24/21 05:57: 


Nucleated Red Blood Cells % (auto) 0.0, Prothrombin Time 29.3H, Prothromb Time 

International Ratio 2.70, Anion Gap 5L, Glomerular Filtration Rate 54.5, Calcium

Level 9.3


CBC/BMP


Laboratory Tests


1/24/21 05:57

















DASH BRAR MD            Jan 24, 2021 08:33

## 2021-01-25 NOTE — IPNPDOC
Date Seen


The patient was seen on 1/25/21.





Progress Note


Subjective:


continues to sundown, but re-oriented by daughter at the bedside.


c/o pain in left knee where there is a bruise when she 


ambulated in the room w daughter. increased le edema,but


no sob, cp, cough. good appetite. not scratching her left neck swab


or right anterior thigh sutures.





Objective:


PHYSICAL EXAMINATION:


vitals: see below


GEN: eating oatmeal at the bedside. aaox 1 person only


pleasant .


HEENT: superficial laceration on left forehead and left neck, clean


w/o erythema or tenderness


EOMI, no cervical LAD or thyromegaly . face is symmetric


Lungs: CTAB


Heart: S1S2 irregularly irregular


Abd: +bs soft groin tender w sutures intact no purulence or bloody


discharge left anterior thigh 2x 2 cm laceration w intact sutures


w/o drainage,erythema, crepitus or induration


ext: no cyanosis or clubbing. +1+ edema b/l 





LABORATORY DATA, IMAGING STUDIES, MICROBIOLOGY: SEE BELOW





ASSESSMENT AND PLAN: 


86 y/o F lives w her  at [facility] living fell in her bedroom 

between the two twin


 beds when she got up in the middle of the night to go to the bathroom to 

urinate, scraping her face, 


neck, and groin on the bed frame, needing sutures in the ER for the groin lacera

tion. Pt denied


any prodromal symptoms such as chest pain, pressure, tightness, dizziness, 

lightheadedness,


cough, nausea, vomiting,diaphoresis. Family requesting SNF placement due to 

worsening


dementia and gait imbalance. She otherwise denies any infectious symptoms such 

as fever,


chills, dysuria, urgency, frequency, diarrhea, abd pain. no other c/o.Pt has 

been admitted as


an inpatient s/p  mechanical fall w groin laceration requiring sutures, for 

placement 


due toworsening dementia and gait imbalance per familyrequest. 





Mechanical Fall,Traumatic Injury, Face, neck, left anterior thigh lacer

ation,left 2x2cm anterior thigh laceration requiring sutures


musculoskeletal chest pain, gait imbalance


-due to persistent pain , will make acetaminophen tid


-had lethargy w tramadol


-assisted ambulation only


-full 10 days before sutures can be removed from admission per Dr. hoffmann-keep dry





sundowning


-avoid hyponotics, sedatives


-re-oriented by daughter at the bedside





Alzheimer's Dementia.


-pfs consulted to expedite [facility] application for snf placement





 HTN.


-controlled on home meds





 A-Fib.


-rate controlled


-resumed home mes





 Diastolic Heart Failure.


-fluid overloaded with LE edema


-lasix and resume diuretics





COPD


-inhalers resumed


-compensated





Mechanical Mitral Valve 


-therapeutic on warfarin





 CKD Stage 3


-resumed home meds.





VS, I&O, 24H, Fishbone


Vital Signs/I&O





Vital Signs








  Date Time  Temp Pulse Resp B/P (MAP) Pulse Ox O2 Delivery O2 Flow Rate FiO2


 


1/25/21 06:00 98.7 61 18 137/57 (83) 94 Room Air  














I&O- Last 24 Hours up to 6 AM 


 


 1/25/21





 06:00


 


Intake Total 1770 ml


 


Output Total 350 ml


 


Balance 1420 ml











Laboratory Data


24H LABS


Laboratory Tests 2


1/25/21 05:42: 


Nucleated Red Blood Cells % (auto) 0.0, Prothrombin Time 30.2H, Prothromb Time 

International Ratio 2.81, Anion Gap 8, Glomerular Filtration Rate 57.0, Calcium 

Level 9.5


CBC/BMP


Laboratory Tests


1/25/21 05:42

















DASH BRAR MD            Jan 25, 2021 09:28

## 2021-01-26 NOTE — IPNPDOC
Text Note


Date of Service


The patient was seen on 1/26/21.





NOTE


Subjective: Continues to have sundowning, but re-oriented by daughter at the 

bedside. Does not offer any complaints this morning. 





PHYSICAL EXAMINATION:


vitals: see below


GEN: eating oatmeal at the bedside. aaox 1 person only, pleasant and 

cooperative.


HEENT: superficial laceration on left forehead and left neck, clean w/o erythema

or tenderness


EOMI, no cervical LAD or thyromegaly . face is symmetric


Lungs: CTAB


Heart: S1S2 irregularly irregular, no murmur or gallop


Abd: +bs, soft, nontender, groin tender w sutures intact no purulence 


ext: no cyanosis or clubbing. +1+ edema b/l, left anterior thigh 2x 2 cm 

laceration w intact sutures w/o drainage,erythema, crepitus or induration





LABORATORY DATA, IMAGING STUDIES, MICROBIOLOGY: SEE BELOW





ASSESSMENT AND PLAN: 


84 y/o F lives w her  at [facility] living fell in her bedroom 

between the two twin


 beds when she got up in the middle of the night to go to the bathroom to 

urinate, scraping her face, 


neck, and groin on the bed frame, needing sutures in the ER for the groin 

laceration. Pt denied


any prodromal symptoms such as chest pain, pressure, tightness, dizziness, 

lightheadedness,


cough, nausea, vomiting,diaphoresis. Family requesting SNF placement due to 

worsening


dementia and gait imbalance. She otherwise denies any infectious symptoms such 

as fever,


chills, dysuria, urgency, frequency, diarrhea, abd pain. no other c/o.Pt has 

been admitted as


an inpatient s/p  mechanical fall w groin laceration requiring sutures, for 

placement 


due toworsening dementia and gait imbalance per familyrequest. 





Mechanical Fall/ gait instability.


Traumatic Injury, Face, neck,  left 2x2cm anterior thigh laceration requiring 

sutures


Pain control with tylenol


had lethargy w tramadol


assisted ambulation only


full 10 days before sutures can be removed from admission per Dr. hoffmann-keep dry





Sundowning/ Delirium


avoid hyponotics, sedatives


re-oriented by daughter at the bedside





Alzheimer's Dementia.


for long term placement.


On Donepezil





HTN.


BP controlled


On diuretics





Mechanical Mitral Valve 


therapeutic on warfarin





A-Fib.


rate controlled


resumed home meds


On coumadin.





Diastolic Heart Failure.


resumed diuretics.


On torsemide and spironolactone 





COPD


inhalers resumed


compensated


advair, incruse





CKD Stage 3


resumed home meds.





HLD


statin





Hypothyroid


synthroid. 





Dispo: Awaiting long term placement. Will change to [facility] status. OK to go to [facility] for second dose of COVID vaccine on 1/27/21





VS,[place], I+O


VS, [place], I+O


Laboratory Tests


1/26/21 06:36











Vital Signs








  Date Time  Temp Pulse Resp B/P (MAP) Pulse Ox O2 Delivery O2 Flow Rate FiO2


 


1/26/21 06:00 97.7 60 17 134/51 (78) 92 Room Air  














I&O- Last 24 Hours up to 6 AM 


 


 1/26/21





 07:00


 


Intake Total 1020 ml


 


Output Total 250 ml


 


Balance 770 ml

















ASHLEY GONZALEZ MD                   Jan 26, 2021 23:18

## 2021-01-27 NOTE — DS.PDOC
Discharge Summary


General


Date of Admission


Jan 22, 2021 at 09:56


Date of Discharge


1/27/21





Discharge Summary


PROCEDURES PERFORMED DURING STAY: Stitches on right thigh laceration. 





DISCHARGE DIAGNOSES:


Advanced Dementia


Fall with laceration to right thigh





SECONDARY DIAGNOSIS:


HTN, Mechanical Mitral valve on coumadin, COPD, Diastolic CHF, CKD stage 3, 

chronic afib, HLD, hypothyroid 





COMPLICATIONS/CHIEF COMPLAINT: Leg Laceration.





HOSPITAL COURSE: 84 y/o F with dementia, gait instability, HTN, mechanical 

Mitral  valve on coumadin, COPD, Diastolic CHF, CKD stage 3, chronic afib, HLD, 

hypothyroid lived with her  at [facility] fell in her 

bedroom between the two twin beds when she got up in the middle of the night to 

go to the bathroom to urinate, scraping her face, neck, and thigh on the bed 

frame, needing sutures in the ER for the thigh laceration. Family requesting SNF

placement due to worsening dementia and gait imbalance. 





Mechanical Fall/ gait instability.


Traumatic Injury, Face, neck,  right 2cm anterior thigh laceration requiring 

sutures


Pain control with tylenol


had lethargy with  tramadol


assisted ambulation only


full 10 days before sutures can be removed from admission per Dr. hoffmann-keep dry





Sundowning/ Delirium


avoid hyponotics, sedatives


re-oriented by daughter at the bedside





Alzheimer's Dementia.


for long term placement.


On Donepezil





HTN.


BP controlled


On diuretics





Mechanical Mitral Valve 


therapeutic on warfarin


INR range 2.5 to 3.5 therapeutic for this patient. 





A-Fib.


rate controlled


resumed home meds


On coumadin.





Diastolic Heart Failure.


resumed diuretics.


On torsemide and spironolactone 





COPD


inhalers resumed


compensated


advair, incruse





CKD Stage 3


resumed home meds.





HLD


statin





Hypothyroid


synthroid. 





DISCHARGE MEDICATIONS: Please see below.


 


ALLERGIES: Please see below.





PHYSICAL EXAMINATION ON DISCHARGE:


VITAL SIGNS: Please see below.


GEN: eating oatmeal at the bedside. aaox 1 person only, pleasant and 

cooperative.


HEENT: superficial laceration on left forehead and left neck, clean w/o erythema

or tenderness


EOMI, no cervical LAD or thyromegaly . face is symmetric


Lungs: CTAB


Heart: S1S2 irregularly irregular, no murmur or gallop


ABD: +bs, soft, nontender, groin tender w sutures intact no purulence 


ext: no cyanosis or clubbing. +1+ edema b/l, right anterior thigh 2x 2 cm 

laceration w intact sutures w/o drainage,erythema, crepitus or induration





LABORATORY DATA: Please see below.





ACTIVITY: [As tolerated].





DIET:As tolerated





DISCHARGE PLAN: [facility]





DISCHARGE CONDITION: [Stable].





TIME SPENT ON DISCHARGE: 35 minutes.





Vital Signs/I&Os





Vital Signs








  Date Time  Temp Pulse Resp B/P (MAP) Pulse Ox O2 Delivery O2 Flow Rate FiO2


 


1/27/21 06:00 98.5 63 19 130/50 (76) 94 Room Air  














I&O- Last 24 Hours up to 6 AM 


 


 1/27/21





 06:00


 


Intake Total 700 ml


 


Balance 700 ml











Laboratory Data


Labs 24H


Laboratory Tests 2


1/26/21 15:38: Coronavirus (COVID-19)(PCR) NEGATIVE


1/27/21 06:55: 


Nucleated Red Blood Cells % (auto) 0.0, Prothrombin Time 32.3H, Prothromb Time 

International Ratio 3.06, Anion Gap 9, Glomerular Filtration Rate 55.1, Calcium 

Level 9.7


CBC/BMP


Laboratory Tests


1/27/21 06:55











Discharge Medications


Scheduled


Bacitracin (Bacitracin) 28.4 Gm Oint...g., 0 DOSE TOP BID


Calcium Carbonate/Vitamin D3 (Calcium 500-Vit D3 200 Caplet) 1 Each Tablet, 1 

TAB PO TID, (Reported)


Cholecalciferol (Vitamin D3) (Vitamin D3) 125 Mcg Capsule, 125 MCG PO DAILY, 

(Reported)


   TAKES AT 1300 


Docusate Sodium (Colace) 100 Mg Capsule, 100 MG PO BID, (Reported)


Donepezil HCl (Aricept) 5 Mg Tab, 5 MG PO QHS, (Reported)


Levothyroxine Sodium (Levothyroxine Sodium) 25 Mcg Tablet, 25 MCG PO DAILY, 

(Reported)


Multivitamins (Thera M Plus Tablet) 1 Each Tablet, 1 TAB PO QHS, (Reported)


Potassium Chloride (Potassium Chloride) 10 Meq Tab.er.prt, 10 MEQ PO BID, 

(Reported)


   TAKES 0800 AND 1800 


Salmeterol/Fluticasone (Advair 100-50 Diskus) 28 Puff/Inhaler Aerp, 1 PUFF INH 

BID, (Reported)


Simvastatin (Simvastatin) 40 Mg Tab, 40 MG PO QHS, (Reported)


Spironolactone (Spironolactone) 25 Mg Tab, 25 MG PO DAILY, (Reported)


Torsemide (Torsemide) 100 Mg Tab, 100 MG PO DAILY, (Reported)


Umeclidinium Bromide (Incruse Ellipta) 62.5 Mcg Blst.w.dev, 1 PUFF PO QHS, 

(Reported)


Warfarin Sodium (Warfarin Sodium) 3 Mg Tablet, 3 MG PO QHS, (Reported)





Scheduled PRN


Acetaminophen (Tylenol) 325 Mg Tablet, 650 MG PO Q6H PRN for PAIN / FEVER, 

(Reported)


Albuterol Sulf (Albuterol Sulfate) 2.5 Mg/3 Ml Vial.neb, 2.5 MG INH QID PRN for 

SOB/WHEEZING, (Reported)


Ipratropium/Albuterol Sulfate (Iprat-Albut 0.5-3(2.5) mg/3 ml) 3 Ml Ampul.neb, 1

 SOL INH QID PRN for SHORTNESS OF BREATH, (Reported)





Allergies


Coded Allergies:  


     Penicillins (Verified  Allergy, Intermediate, rash, 11/25/20)


     chlorpromazine (Verified  Allergy, Unknown, 11/25/20)


     codeine (Verified  Adverse Reaction, Unknown, vomiting, 11/25/20)











ASHLEY GONZALEZ MD                   Jan 27, 2021 09:37

## 2021-02-07 NOTE — HPEPDOC
[facility] History & Physical


Date of Admission


2021


Date of Service:  2021





History and Physical


CHIEF COMPLAINT: mechanical fall at [facility]





HPI:


86 y/o F lives w her  at [facility] fell in her bedroom 

between the two twin


 beds when she got up in the middle of the night to go to the bathroom to 

urinate, scraping her face, 


neck, and groin on the bed frame, needing sutures in the ER for the groin 

laceration. Pt denied


any prodromal symptoms such as chest pain, pressure, tightness, dizziness, 

lightheadedness,


cough, nausea, vomiting,diaphoresis. Family requesting SNF placement due to 

worsening


dementia and gait imbalance. She otherwise denies any infectious symptoms such 

as fever,


chills, dysuria, urgency, frequency, diarrhea, abd pain. no other c/o.








PAST MEDICAL HISTORY:


1. Alzheimer's Dementia.


2. HTN.


3. A-Fib.


4. Diastolic Heart Failure.


5. COPD


6. Mechanical Mitral Valve 


7. CKD Stage 3





PAST SURGICAL HISTORY:


1. Pacemaker.


2. Mitral Mechanical Valve Replacement.





SOCIAL HISTORY:


Marital status: .


Resides in: [facility]. Has resided there since 

2020


Employment: Retired . 


Tobacco use: Former smoker. Daughter reports that she quit in the 70s, and 

smoked less than one pack a day


ETOH: used to drink socially. 





FAMILY HISTORY:


Mother:  of a stroke in her 60s.  





ALLERGIES: Please see below.





REVIEW OF SYSTEMS:


12 point ROS negative aside from +findings on HPI





HOME MEDICATIONS: Please see below. 





PHYSICAL EXAMINATION:


vitals: see below


GEN: aaox1 person only. pleasant but confused no pallor


no distress. speaks in full sentences


HEENT: superficial laceration on left forehead and left neck, 


EOMI, no cervical LAD or thyromegaly . face is symmetric


Lungs: CTAB


Heart: S1S2 irregularly irregular


Abd: +bs soft groin tender w sutures intact no purulence or bloody


discharge


ext: no cyanosis or clubbing





LABORATORY DATA, IMAGING STUDIES, MICROBIOLOGY: SEE BELOW





ASSESSMENT AND PLAN: 


86 y/o F lives w her  at [facility] fell in her bedroom 

between the two twin


 beds when she got up in the middle of the night to go to the bathroom to 

urinate, scraping her face, 


neck, and groin on the bed frame, needing sutures in the ER for the groin 

laceration. Pt denied


any prodromal symptoms such as chest pain, pressure, tightness, dizziness, 

lightheadedness,


cough, nausea, vomiting,diaphoresis. Family requesting SNF placement due to 

worsening


dementia and gait imbalance. She otherwise denies any infectious symptoms such 

as fever,


chills, dysuria, urgency, frequency, diarrhea, abd pain. no other c/o.Pt has 

been admitted as


an inpatient s/p  mechanical fall w groin laceration requiring sutures, for 

placement 


due toworsening dementia and gait imbalance per familyrequest. 





Mechanical Fall


Traumatic Injury


Face, neck, groin laceration


groin laceration requiring sutures


gait imbalance


Alzheimer's Dementia.


 HTN.


 A-Fib.


 Diastolic Heart Failure.


COPD


Mechanical Mitral Valve 


 CKD Stage 3





PLAN:


s/p sutures by ER MD Dr. Seay. topical bacitracin for open lacerations.


resumed on home meds. prn tramadol for pain. bid tylenol for comfort.


pfs consulted to assist in snf placement. no other acute medical issues.





Home Medications


Scheduled


Bacitracin (Bacitracin) 28.4 Gm Oint...g., 0 DOSE TOP BID


Calcium Carbonate/Vitamin D3 (Calcium 500-Vit D3 200 Caplet) 1 Each Tablet, 1 

TAB PO TID


Cholecalciferol (Vitamin D3) (Vitamin D3) 125 Mcg Capsule, 125 MCG PO DAILY


   TAKES AT 1300 


Docusate Sodium (Colace) 100 Mg Capsule, 100 MG PO BID


Donepezil HCl (Aricept) 5 Mg Tab, 5 MG PO QHS


Levothyroxine Sodium (Levothyroxine Sodium) 25 Mcg Tablet, 25 MCG PO DAILY


Multivitamins (Thera M Plus Tablet) 1 Each Tablet, 1 TAB PO QHS


Potassium Chloride (Potassium Chloride) 10 Meq Tab.er.prt, 10 MEQ PO BID


   TAKES 0800 AND 1800 


Salmeterol/Fluticasone (Advair 100-50 Diskus) 28 Puff/Inhaler Aerp, 1 PUFF INH 

BID


Simvastatin (Simvastatin) 40 Mg Tab, 40 MG PO QHS


Spironolactone (Spironolactone) 25 Mg Tab, 25 MG PO DAILY


Torsemide (Torsemide) 100 Mg Tab, 100 MG PO DAILY


Umeclidinium Bromide (Incruse Ellipta) 62.5 Mcg Blst.w.dev, 1 PUFF PO QHS


Warfarin Sodium (Warfarin Sodium) 3 Mg Tablet, 3 MG PO QHS





Scheduled PRN


Acetaminophen (Tylenol) 325 Mg Tablet, 650 MG PO Q6H PRN for PAIN / FEVER


Albuterol Sulf (Albuterol Sulfate) 2.5 Mg/3 Ml Vial.neb, 2.5 MG INH QID PRN for 

SOB/WHEEZING


Ipratropium/Albuterol Sulfate (Iprat-Albut 0.5-3(2.5) mg/3 ml) 3 Ml Ampul.neb, 1

SOL INH QID PRN for SHORTNESS OF BREATH





Allergies


Coded Allergies:  


     Penicillins (Verified  Allergy, Intermediate, rash, 20)


     chlorpromazine (Verified  Allergy, Unknown, 20)


     codeine (Verified  Adverse Reaction, Unknown, vomiting, 20)





A-FIB/CHADSVASC


A-FIB History


Current/History of A-Fib/PAF?:  Yes


Current PO Anticoag Therapy:  Yes





Age/Risk Factor Scoring


CHADSVASC:  








CHADSVASC Response (Comments) Value


 


Age Risk Factor Age >/= 75 years old 2


 


Gender Risk Factor Female 1


 


Hx of CHF Yes 1


 


Hx of HTN Yes 1


 


Hx of Stroke/TIA/or VTE No 0


 


Hx of Diabetes No 0


 


Hx of Vascular Disease No 0


 


Total  5











Treatment


Treatment ordered:  Apixaban











DASH BRAR MD             2021 16:26

## 2021-09-04 NOTE — REP
INDICATION:

POST FALL/ [facility] PATIENT.



COMPARISON:

Right hip, 09/19/2017.



TECHNIQUE:

AP and frogleg lateral views of the right hip were obtained.



FINDINGS:

There is severe arthritis of the right hip with almost complete loss of the joint

space, endplate sclerosis and subchondral cysts and marginal osteophytes.  There is no

fracture or dislocation.



IMPRESSION:

Severe arthritis of the right hip.  No evidence of fracture or dislocation.





<Electronically signed by Esau De Luna > 09/04/21 [id number]

## 2021-09-04 NOTE — REP
INDICATION:

POST FALL/ [facility] PATIENT.



COMPARISON:

None.



TECHNIQUE:

Three images of the right shoulder were obtained.



FINDINGS:

There is moderate arthritis of the acromioclavicular and glenohumeral joints.  There

is cranial migration of the humeral head consistent with rotator cuff arthropathy.

There is no evidence of fracture or dislocation.  The periarticular soft tissues are

normal.  The adjacent right lung is clear.



IMPRESSION:

1.  Arthritis of the acromioclavicular and glenohumeral joints.

2.  Rotator cuff arthropathy.

3.  No evidence of acute fracture or dislocation.





<Electronically signed by Esau De Luna > 09/04/21 [id number]

## 2021-11-05 NOTE — REPVR
PROCEDURE INFORMATION: 

Exam: CT Head Without Contrast 

Exam date and time: 11/5/2021 3:16 PM 

Age: 86 years old 

Clinical indication: Injury or trauma; Fall; Blunt trauma (contusions or 

hematomas); Additional info: Fall injury 



TECHNIQUE: 

Imaging protocol: Computed tomography of the head without contrast. 

Radiation optimization: All CT scans at this facility use at least one of these 

dose optimization techniques: automated exposure control; mA and/or kV 

adjustment per patient size (includes targeted exams where dose is matched to 

clinical indication); or iterative reconstruction. 



COMPARISON: 

CT Head without contrast 8/6/2021 4:23 PM 



FINDINGS: 

Brain: There is no acute intracranial hemorrhage, cerebral edema, or midline 

shift. Chronic microvascular ischemic changes are seen in the periventricular 

white matter. Age-related cerebral and cerebellar volume loss is present. 

Cerebral ventricles: Moderate ex vacuo dilation of the lateral and third 

ventricles is noted. 

Paranasal sinuses: There is no acute sinusitis. 

Mastoid air cells: The mastoid air cells are clear. 

Orbital cavity: The included orbital structures are unremarkable. 

Vasculature: Atherosclerotic calcifications are seen involving the cavernous 

carotid arteries. 

Bones/joints: No acute fracture. 

Soft tissues: Unremarkable. 



IMPRESSION: 

1. No acute intracranial abnormality. 

2. Atrophy and chronic deep white matter ischemic changes. 



Electronically signed by: Peter Schmidt On 11/05/2021  15:30:18 PM